# Patient Record
Sex: FEMALE | Race: WHITE | Employment: OTHER
[De-identification: names, ages, dates, MRNs, and addresses within clinical notes are randomized per-mention and may not be internally consistent; named-entity substitution may affect disease eponyms.]

---

## 2017-10-12 ENCOUNTER — RECORDS - HEALTHEAST (OUTPATIENT)
Dept: ADMINISTRATIVE | Facility: OTHER | Age: 82
End: 2017-10-12

## 2017-11-16 ENCOUNTER — RECORDS - HEALTHEAST (OUTPATIENT)
Dept: ADMINISTRATIVE | Facility: OTHER | Age: 82
End: 2017-11-16

## 2017-11-29 ENCOUNTER — COMMUNICATION - HEALTHEAST (OUTPATIENT)
Dept: HEALTH INFORMATION MANAGEMENT | Facility: CLINIC | Age: 82
End: 2017-11-29

## 2018-05-21 ENCOUNTER — HOSPITAL ENCOUNTER (INPATIENT)
Facility: CLINIC | Age: 83
LOS: 24 days | Discharge: HOME HEALTH | DRG: 207 | End: 2018-06-14
Attending: NURSE PRACTITIONER | Admitting: HOSPITALIST
Payer: MEDICARE

## 2018-05-21 ENCOUNTER — APPOINTMENT (OUTPATIENT)
Dept: GENERAL RADIOLOGY | Facility: CLINIC | Age: 83
DRG: 207 | End: 2018-05-21
Attending: SURGERY
Payer: MEDICARE

## 2018-05-21 ENCOUNTER — APPOINTMENT (OUTPATIENT)
Dept: CT IMAGING | Facility: CLINIC | Age: 83
DRG: 207 | End: 2018-05-21
Attending: NURSE PRACTITIONER
Payer: MEDICARE

## 2018-05-21 DIAGNOSIS — R41.0 DELIRIUM: ICD-10-CM

## 2018-05-21 DIAGNOSIS — Z51.89 ENCOUNTER FOR WOUND CARE: ICD-10-CM

## 2018-05-21 DIAGNOSIS — I35.0 NONRHEUMATIC AORTIC VALVE STENOSIS: ICD-10-CM

## 2018-05-21 DIAGNOSIS — T83.511A URINARY TRACT INFECTION ASSOCIATED WITH CATHETERIZATION OF URINARY TRACT, UNSPECIFIED INDWELLING URINARY CATHETER TYPE, INITIAL ENCOUNTER (H): ICD-10-CM

## 2018-05-21 DIAGNOSIS — F32.A DEPRESSION, UNSPECIFIED DEPRESSION TYPE: ICD-10-CM

## 2018-05-21 DIAGNOSIS — K21.9 GASTROESOPHAGEAL REFLUX DISEASE, ESOPHAGITIS PRESENCE NOT SPECIFIED: ICD-10-CM

## 2018-05-21 DIAGNOSIS — J96.21 ACUTE ON CHRONIC RESPIRATORY FAILURE WITH HYPOXEMIA (H): ICD-10-CM

## 2018-05-21 DIAGNOSIS — E87.6 HYPOKALEMIA: ICD-10-CM

## 2018-05-21 DIAGNOSIS — G47.00 INSOMNIA, UNSPECIFIED TYPE: ICD-10-CM

## 2018-05-21 DIAGNOSIS — E11.9 DIABETES MELLITUS WITHOUT COMPLICATION (H): Primary | ICD-10-CM

## 2018-05-21 DIAGNOSIS — N39.0 URINARY TRACT INFECTION ASSOCIATED WITH CATHETERIZATION OF URINARY TRACT, UNSPECIFIED INDWELLING URINARY CATHETER TYPE, INITIAL ENCOUNTER (H): ICD-10-CM

## 2018-05-21 DIAGNOSIS — H04.123 DRY EYES: ICD-10-CM

## 2018-05-21 DIAGNOSIS — N18.9 CHRONIC RENAL IMPAIRMENT, UNSPECIFIED CKD STAGE: ICD-10-CM

## 2018-05-21 DIAGNOSIS — K59.00 CONSTIPATION, UNSPECIFIED CONSTIPATION TYPE: ICD-10-CM

## 2018-05-21 DIAGNOSIS — R10.9 ABDOMINAL CRAMPING: ICD-10-CM

## 2018-05-21 DIAGNOSIS — J31.0 CHRONIC RHINITIS, UNSPECIFIED TYPE: ICD-10-CM

## 2018-05-21 DIAGNOSIS — I50.33 ACUTE ON CHRONIC DIASTOLIC CONGESTIVE HEART FAILURE (H): ICD-10-CM

## 2018-05-21 DIAGNOSIS — Z79.01 CHRONIC ANTICOAGULATION: ICD-10-CM

## 2018-05-21 DIAGNOSIS — Z71.89 ACP (ADVANCE CARE PLANNING): Chronic | ICD-10-CM

## 2018-05-21 DIAGNOSIS — R23.8 SKIN IRRITATION: ICD-10-CM

## 2018-05-21 DIAGNOSIS — I48.0 PAROXYSMAL ATRIAL FIBRILLATION (H): ICD-10-CM

## 2018-05-21 DIAGNOSIS — K56.0 PARALYTIC ILEUS (H): ICD-10-CM

## 2018-05-21 DIAGNOSIS — G89.29 OTHER CHRONIC PAIN: ICD-10-CM

## 2018-05-21 DIAGNOSIS — Z99.11 VENTILATOR DEPENDENCE (H): ICD-10-CM

## 2018-05-21 LAB
ANION GAP SERPL CALCULATED.3IONS-SCNC: 6 MMOL/L (ref 3–14)
BASOPHILS # BLD AUTO: 0.1 10E9/L (ref 0–0.2)
BASOPHILS NFR BLD AUTO: 0.8 %
BUN SERPL-MCNC: 73 MG/DL (ref 7–30)
CALCIUM SERPL-MCNC: 8.9 MG/DL (ref 8.5–10.1)
CHLORIDE SERPL-SCNC: 98 MMOL/L (ref 94–109)
CO2 SERPL-SCNC: 33 MMOL/L (ref 20–32)
CREAT SERPL-MCNC: 1.12 MG/DL (ref 0.52–1.04)
DIFFERENTIAL METHOD BLD: ABNORMAL
EOSINOPHIL # BLD AUTO: 0.1 10E9/L (ref 0–0.7)
EOSINOPHIL NFR BLD AUTO: 1.8 %
ERYTHROCYTE [DISTWIDTH] IN BLOOD BY AUTOMATED COUNT: 18.8 % (ref 10–15)
GFR SERPL CREATININE-BSD FRML MDRD: 46 ML/MIN/1.7M2
GLUCOSE BLDC GLUCOMTR-MCNC: 157 MG/DL (ref 70–99)
GLUCOSE SERPL-MCNC: 175 MG/DL (ref 70–99)
HCT VFR BLD AUTO: 33.4 % (ref 35–47)
HGB BLD-MCNC: 10.1 G/DL (ref 11.7–15.7)
IMM GRANULOCYTES # BLD: 0 10E9/L (ref 0–0.4)
IMM GRANULOCYTES NFR BLD: 0.4 %
INR PPP: 2.25 (ref 0.86–1.14)
LYMPHOCYTES # BLD AUTO: 0.7 10E9/L (ref 0.8–5.3)
LYMPHOCYTES NFR BLD AUTO: 8.8 %
MCH RBC QN AUTO: 26.8 PG (ref 26.5–33)
MCHC RBC AUTO-ENTMCNC: 30.2 G/DL (ref 31.5–36.5)
MCV RBC AUTO: 89 FL (ref 78–100)
MONOCYTES # BLD AUTO: 0.8 10E9/L (ref 0–1.3)
MONOCYTES NFR BLD AUTO: 9.4 %
NEUTROPHILS # BLD AUTO: 6.3 10E9/L (ref 1.6–8.3)
NEUTROPHILS NFR BLD AUTO: 78.8 %
NRBC # BLD AUTO: 0 10*3/UL
NRBC BLD AUTO-RTO: 0 /100
PLATELET # BLD AUTO: 304 10E9/L (ref 150–450)
POTASSIUM SERPL-SCNC: 3.7 MMOL/L (ref 3.4–5.3)
RBC # BLD AUTO: 3.77 10E12/L (ref 3.8–5.2)
SODIUM SERPL-SCNC: 137 MMOL/L (ref 133–144)
WBC # BLD AUTO: 8 10E9/L (ref 4–11)

## 2018-05-21 PROCEDURE — 80048 BASIC METABOLIC PNL TOTAL CA: CPT | Performed by: NURSE PRACTITIONER

## 2018-05-21 PROCEDURE — 87641 MR-STAPH DNA AMP PROBE: CPT | Performed by: HOSPITALIST

## 2018-05-21 PROCEDURE — 99223 1ST HOSP IP/OBS HIGH 75: CPT | Mod: AI | Performed by: HOSPITALIST

## 2018-05-21 PROCEDURE — 87640 STAPH A DNA AMP PROBE: CPT | Mod: XU | Performed by: HOSPITALIST

## 2018-05-21 PROCEDURE — 94640 AIRWAY INHALATION TREATMENT: CPT

## 2018-05-21 PROCEDURE — 36415 COLL VENOUS BLD VENIPUNCTURE: CPT | Performed by: NURSE PRACTITIONER

## 2018-05-21 PROCEDURE — 25000128 H RX IP 250 OP 636: Performed by: HOSPITALIST

## 2018-05-21 PROCEDURE — 99285 EMERGENCY DEPT VISIT HI MDM: CPT | Mod: 25

## 2018-05-21 PROCEDURE — 40000281 ZZH STATISTIC TRANSPORT TIME EA 15 MIN

## 2018-05-21 PROCEDURE — 85025 COMPLETE CBC W/AUTO DIFF WBC: CPT | Performed by: NURSE PRACTITIONER

## 2018-05-21 PROCEDURE — 20000003 ZZH R&B ICU

## 2018-05-21 PROCEDURE — 5A1955Z RESPIRATORY VENTILATION, GREATER THAN 96 CONSECUTIVE HOURS: ICD-10-PCS | Performed by: NURSE PRACTITIONER

## 2018-05-21 PROCEDURE — 85610 PROTHROMBIN TIME: CPT | Performed by: NURSE PRACTITIONER

## 2018-05-21 PROCEDURE — 40000275 ZZH STATISTIC RCP TIME EA 10 MIN

## 2018-05-21 PROCEDURE — 94003 VENT MGMT INPAT SUBQ DAY: CPT | Performed by: SURGERY

## 2018-05-21 PROCEDURE — 40000986 XR CHEST PORT 1 VW

## 2018-05-21 PROCEDURE — 71250 CT THORAX DX C-: CPT

## 2018-05-21 PROCEDURE — 25000132 ZZH RX MED GY IP 250 OP 250 PS 637: Performed by: NURSE PRACTITIONER

## 2018-05-21 PROCEDURE — 25000125 ZZHC RX 250: Performed by: NURSE PRACTITIONER

## 2018-05-21 PROCEDURE — 00000146 ZZHCL STATISTIC GLUCOSE BY METER IP

## 2018-05-21 RX ORDER — WATER 10 ML/10ML
INJECTION INTRAMUSCULAR; INTRAVENOUS; SUBCUTANEOUS
Status: DISPENSED
Start: 2018-05-21 | End: 2018-05-22

## 2018-05-21 RX ORDER — L. ACIDOPHILUS/L.BULGARICUS 1MM CELL
2 TABLET ORAL 2 TIMES DAILY
Status: ON HOLD | COMMUNITY
End: 2018-06-14

## 2018-05-21 RX ORDER — DEXTROSE MONOHYDRATE 25 G/50ML
25-50 INJECTION, SOLUTION INTRAVENOUS
Status: DISCONTINUED | OUTPATIENT
Start: 2018-05-21 | End: 2018-06-14 | Stop reason: HOSPADM

## 2018-05-21 RX ORDER — NALOXONE HYDROCHLORIDE 0.4 MG/ML
.1-.4 INJECTION, SOLUTION INTRAMUSCULAR; INTRAVENOUS; SUBCUTANEOUS
Status: DISCONTINUED | OUTPATIENT
Start: 2018-05-21 | End: 2018-06-14 | Stop reason: HOSPADM

## 2018-05-21 RX ORDER — PETROLATUM 42 G/100G
OINTMENT TOPICAL DAILY PRN
Status: ON HOLD | COMMUNITY
End: 2018-06-14

## 2018-05-21 RX ORDER — POLYETHYLENE GLYCOL 3350 17 G/17G
17 POWDER, FOR SOLUTION ORAL DAILY PRN
Status: DISCONTINUED | OUTPATIENT
Start: 2018-05-21 | End: 2018-06-03

## 2018-05-21 RX ORDER — ONDANSETRON 2 MG/ML
4 INJECTION INTRAMUSCULAR; INTRAVENOUS EVERY 6 HOURS PRN
Status: DISCONTINUED | OUTPATIENT
Start: 2018-05-21 | End: 2018-06-14 | Stop reason: HOSPADM

## 2018-05-21 RX ORDER — CHLORHEXIDINE GLUCONATE ORAL RINSE 1.2 MG/ML
15 SOLUTION DENTAL 2 TIMES DAILY
Status: DISCONTINUED | OUTPATIENT
Start: 2018-05-21 | End: 2018-05-28

## 2018-05-21 RX ORDER — BISACODYL 10 MG
10 SUPPOSITORY, RECTAL RECTAL DAILY PRN
Status: ON HOLD | COMMUNITY
End: 2018-06-14

## 2018-05-21 RX ORDER — FLUTICASONE PROPIONATE 50 MCG
1 SPRAY, SUSPENSION (ML) NASAL DAILY
Status: ON HOLD | COMMUNITY
End: 2018-06-14

## 2018-05-21 RX ORDER — IPRATROPIUM BROMIDE AND ALBUTEROL SULFATE 2.5; .5 MG/3ML; MG/3ML
SOLUTION RESPIRATORY (INHALATION)
Status: DISCONTINUED
Start: 2018-05-21 | End: 2018-05-21 | Stop reason: HOSPADM

## 2018-05-21 RX ORDER — IPRATROPIUM BROMIDE AND ALBUTEROL SULFATE 2.5; .5 MG/3ML; MG/3ML
1 SOLUTION RESPIRATORY (INHALATION) 2 TIMES DAILY PRN
Status: DISCONTINUED | OUTPATIENT
Start: 2018-05-21 | End: 2018-05-29

## 2018-05-21 RX ORDER — BISACODYL 10 MG
10 SUPPOSITORY, RECTAL RECTAL DAILY PRN
Status: DISCONTINUED | OUTPATIENT
Start: 2018-05-21 | End: 2018-06-14 | Stop reason: HOSPADM

## 2018-05-21 RX ORDER — CHLORHEXIDINE GLUCONATE ORAL RINSE 1.2 MG/ML
15 SOLUTION DENTAL EVERY 12 HOURS
Status: DISCONTINUED | OUTPATIENT
Start: 2018-05-21 | End: 2018-05-21

## 2018-05-21 RX ORDER — PROCHLORPERAZINE 25 MG
12.5 SUPPOSITORY, RECTAL RECTAL EVERY 12 HOURS PRN
Status: DISCONTINUED | OUTPATIENT
Start: 2018-05-21 | End: 2018-06-14 | Stop reason: HOSPADM

## 2018-05-21 RX ORDER — LANOLIN ALCOHOL/MO/W.PET/CERES
6 CREAM (GRAM) TOPICAL AT BEDTIME
Status: DISCONTINUED | OUTPATIENT
Start: 2018-05-21 | End: 2018-05-31

## 2018-05-21 RX ORDER — MIRTAZAPINE 7.5 MG/1
15 TABLET, FILM COATED ORAL AT BEDTIME
Status: DISCONTINUED | OUTPATIENT
Start: 2018-05-21 | End: 2018-05-31

## 2018-05-21 RX ORDER — ONDANSETRON 4 MG/1
4 TABLET, ORALLY DISINTEGRATING ORAL EVERY 6 HOURS PRN
Status: DISCONTINUED | OUTPATIENT
Start: 2018-05-21 | End: 2018-06-14 | Stop reason: HOSPADM

## 2018-05-21 RX ORDER — TORSEMIDE 10 MG/1
10 TABLET ORAL
Status: DISCONTINUED | OUTPATIENT
Start: 2018-05-22 | End: 2018-06-01

## 2018-05-21 RX ORDER — CHLORHEXIDINE GLUCONATE ORAL RINSE 1.2 MG/ML
15 SOLUTION DENTAL 2 TIMES DAILY
Status: ON HOLD | COMMUNITY
End: 2018-06-14

## 2018-05-21 RX ORDER — GINSENG 100 MG
CAPSULE ORAL 2 TIMES DAILY
Status: ON HOLD | COMMUNITY
End: 2018-06-14

## 2018-05-21 RX ORDER — IPRATROPIUM BROMIDE AND ALBUTEROL SULFATE 2.5; .5 MG/3ML; MG/3ML
1 SOLUTION RESPIRATORY (INHALATION) 2 TIMES DAILY PRN
Status: ON HOLD | COMMUNITY
End: 2018-06-14

## 2018-05-21 RX ORDER — PROCHLORPERAZINE MALEATE 5 MG
5 TABLET ORAL EVERY 6 HOURS PRN
Status: DISCONTINUED | OUTPATIENT
Start: 2018-05-21 | End: 2018-06-14 | Stop reason: HOSPADM

## 2018-05-21 RX ORDER — METOPROLOL TARTRATE 25 MG/1
25 TABLET, FILM COATED ORAL EVERY 8 HOURS
Status: DISCONTINUED | OUTPATIENT
Start: 2018-05-22 | End: 2018-05-26

## 2018-05-21 RX ORDER — WARFARIN SODIUM 3 MG/1
3 TABLET ORAL ONCE
Status: COMPLETED | OUTPATIENT
Start: 2018-05-21 | End: 2018-05-22

## 2018-05-21 RX ORDER — ACETAMINOPHEN 325 MG/1
650 TABLET ORAL EVERY 4 HOURS PRN
Status: DISCONTINUED | OUTPATIENT
Start: 2018-05-21 | End: 2018-06-14 | Stop reason: HOSPADM

## 2018-05-21 RX ORDER — LACTOBACILLUS RHAMNOSUS GG 10B CELL
1 CAPSULE ORAL 2 TIMES DAILY
Status: DISCONTINUED | OUTPATIENT
Start: 2018-05-21 | End: 2018-06-14 | Stop reason: HOSPADM

## 2018-05-21 RX ORDER — NALOXONE HYDROCHLORIDE 0.4 MG/ML
.1-.4 INJECTION, SOLUTION INTRAMUSCULAR; INTRAVENOUS; SUBCUTANEOUS
Status: DISCONTINUED | OUTPATIENT
Start: 2018-05-21 | End: 2018-05-21

## 2018-05-21 RX ORDER — FLUTICASONE PROPIONATE 50 MCG
1 SPRAY, SUSPENSION (ML) NASAL DAILY
Status: DISCONTINUED | OUTPATIENT
Start: 2018-05-22 | End: 2018-06-14 | Stop reason: HOSPADM

## 2018-05-21 RX ORDER — IPRATROPIUM BROMIDE AND ALBUTEROL SULFATE 2.5; .5 MG/3ML; MG/3ML
3 SOLUTION RESPIRATORY (INHALATION) ONCE
Status: COMPLETED | OUTPATIENT
Start: 2018-05-21 | End: 2018-05-21

## 2018-05-21 RX ORDER — NICOTINE POLACRILEX 4 MG
15-30 LOZENGE BUCCAL
Status: DISCONTINUED | OUTPATIENT
Start: 2018-05-21 | End: 2018-06-14 | Stop reason: HOSPADM

## 2018-05-21 RX ADMIN — ALTEPLASE 2 MG: 2.2 INJECTION, POWDER, LYOPHILIZED, FOR SOLUTION INTRAVENOUS at 22:53

## 2018-05-21 RX ADMIN — METOPROLOL TARTRATE 37.5 MG: 25 TABLET ORAL at 17:18

## 2018-05-21 RX ADMIN — ALTEPLASE 2 MG: 2.2 INJECTION, POWDER, LYOPHILIZED, FOR SOLUTION INTRAVENOUS at 22:54

## 2018-05-21 RX ADMIN — IPRATROPIUM BROMIDE AND ALBUTEROL SULFATE 3 ML: .5; 3 SOLUTION RESPIRATORY (INHALATION) at 16:00

## 2018-05-21 ASSESSMENT — ENCOUNTER SYMPTOMS
SPEECH DIFFICULTY: 1
SHORTNESS OF BREATH: 1

## 2018-05-21 NOTE — IP AVS SNAPSHOT
[facility] Intensive Care Unit    [address] [place] [address]    Phone:  [phone]                                       After Visit Summary   5/21/2018    Jacques Solis    MRN: [id number]           After Visit Summary Signature Page     I have received my discharge instructions, and my questions have been answered. I have discussed any challenges I see with this plan with the nurse or doctor.    ..........................................................................................................................................  Patient/Patient Representative Signature      ..........................................................................................................................................  Patient Representative Print Name and Relationship to Patient    ..................................................               ................................................  Date                                            Time    ..........................................................................................................................................  Reviewed by Signature/Title    ...................................................              ..............................................  Date                                                            Time

## 2018-05-21 NOTE — ED NOTES
Flushed G-tube with 30 mL before administering medication, then flushed with 30 mL water after medication.  Flushes easily.  PICC line, attempted to draw labs and the PICC flushes with slight resistance and does not return blood.  Lab called for phlebotomy draw.

## 2018-05-21 NOTE — IP AVS SNAPSHOT
` `           [facility] INTENSIVE CARE UNIT: [phone]            Medication Administration Report for Jacques Solis as of 05/22/18 1804   Legend:    Given Hold Not Given Due Canceled Entry Other Actions    Time Time (Time) Time  Time-Action       Inactive    Active    Linked        Medications 05/16/18 05/17/18 05/18/18 05/19/18 05/20/18 05/21/18 05/22/18    acetaminophen (TYLENOL) tablet 650 mg  Dose: 650 mg  Freq: EVERY 4 HOURS PRN Route: PO  PRN Reason: mild pain  Start: 05/21/18 2227   Admin Instructions: Maximum acetaminophen dose from all sources = 75 mg/kg/day not to exceed 4 grams/day.    Admin. Amount: 2 tablet (2 × 325 mg tablet)  Dispense Loc:  ADS ICN              Or  acetaminophen (TYLENOL) solution 650 mg  Dose: 650 mg  Freq: EVERY 4 HOURS PRN Route: PER NG TUBE  PRN Reason: mild pain  Start: 05/21/18 2227   Admin Instructions: Maximum acetaminophen dose from all sources= 75 mg/kg/day not to exceed 4 grams/day.    Admin. Amount: 650 mg = 20.3 mL Conc: 32 mg/mL  Dispense Loc:  ADS ICN  Volume: 20.3125 mL               bisacodyl (DULCOLAX) Suppository 10 mg  Dose: 10 mg  Freq: DAILY PRN Route: RE  PRN Reason: constipation  Start: 05/21/18 2218   Admin. Amount: 1 suppository (1 × 10 mg suppository)  Dispense Loc:  ADS ICN               chlorhexidine (PERIDEX) 0.12 % solution 15 mL  Dose: 15 mL  Freq: 2 TIMES DAILY Route: MT  Start: 05/21/18 2230   Admin. Amount: 15 mL  Last Admin: 05/22/18 0830  Dispense Loc:  ADS ICN  Volume: 118 mL           0028 (15 mL)-Given       0830 (15 mL)-Given       [ ] 2100           fluticasone (FLONASE) 50 MCG/ACT spray 1 spray  Dose: 1 spray  Freq: DAILY Route: BOTH NOSTRIL  Start: 05/22/18 0900   Admin. Amount: 1 spray  Last Admin: 05/22/18 0840  Dispense Loc:  Main Pharmacy           0840 (1 spray)-Given           glucose gel 15-30 g  Dose: 15-30 g  Freq: EVERY 15 MIN PRN Route: PO  PRN Reason: low blood sugar  Start: 05/21/18 2229   Admin  Instructions: Give 15 g for BG 51 to 69 mg/dL IF patient is conscious and able to swallow. Give 30 g for BG less than or equal to 50 mg/dL IF patient is conscious and able to swallow. Do NOT give glucose gel via enteral tube.  IF patient has enteral tube: give apple juice 120 mL (4 oz or 15 g of CHO) via enteral tube for BG 51 to 69 mg/dL.  Give apple juice 240 mL (8 oz or 30 g of CHO) via enteral tube for BG less than or equal to 50 mg/dL.    ~Oral gel is preferable for conscious and able to swallow patient.   ~IF gel unavailable or patient refuses may provide apple juice 120 mL (4 oz or 15 g of CHO). Document juice on I and O flowsheet.    Admin. Amount: 15-30 g  Dispense Loc: SH ADS ICN  Volume: 93.75 mL              Or  dextrose 50 % injection 25-50 mL  Dose: 25-50 mL  Freq: EVERY 15 MIN PRN Route: IV  PRN Reason: low blood sugar  Start: 05/21/18 2229   Admin Instructions: Use if have IV access, BG less than 70 mg/dL and meet dose criteria below:  Dose if conscious and alert (or disorientated) and NPO = 25 mL  Dose if unconscious / not alert = 50 mL  Vesicant. For ordered doses up to 25 g, give IV Push undiluted. Give each 5g over 1 minute.    Admin. Amount: 25-50 mL  Dispense Loc: [facility] ADS ICN  Infused Over: 1-5 Minutes  Volume: 50 mL              Or  glucagon injection 1 mg  Dose: 1 mg  Freq: EVERY 15 MIN PRN Route: SC  PRN Reason: low blood sugar  PRN Comment: May repeat x 1 only  Start: 05/21/18 2229   Admin Instructions: May give SQ or IM. ONLY use glucagon IF patient has NO IV access AND is UNABLE to swallow AND blood glucose is LESS than or EQUAL to 50 mg/dL.  If ordered IV, give IV Push over 1 minute. Reconstitute with 1mL sterile water.    Admin. Amount: 1 mg  Dispense Loc: [facility] ADS ICN               hydrogen peroxide 3 % solution  Freq: EVERY 8 HOURS SCHEDULED Route: Top  Start: 05/22/18 1176   Admin Instructions: Trach care-Use Hydrogen peroxide and Sterile Water 1:1 solution.    Dispense Loc: SH ADS  ICN  Volume: 237 mL           [ ] 1645       [ ] 2200           insulin aspart (NovoLOG) inj (RAPID ACTING)  Dose: 1-6 Units  Freq: EVERY 4 HOURS Route: SC  Start: 05/22/18 0000   Admin Instructions: Correction Scale - MEDIUM INSULIN RESISTANCE DOSING     Do Not give Correction Insulin if BG less than 140.  For  - 189 give 1 unit.  For  - 239 give 2 units.  For  - 289 give 3 units.  For  - 339 give 4 units.  For  - 399 give 5 units.  For BG greater than or equal to 400 give 6 units.  Check blood glucose Q4H and administer based on blood glucose.  Notify provider if glucose greater than or equal to 350 mg/dL after administration of correction dose.  If given at mealtime, must be administered 5 min before meal or immediately after.    Admin. Amount: 1-6 Units  Last Admin: 05/22/18 1617  Dispense Loc: Contact Rx for dose  Volume: 3 mL           (0026)-Not Given       0416 (1 Units)-Given       0835 (1 Units)-Given       1342 (2 Units)-Given       1617 (2 Units)-Given [C]       [ ] 2000           insulin glargine (LANTUS) injection 12 Units  Dose: 12 Units  Freq: AT BEDTIME Route: SC  Start: 05/21/18 2230   Admin. Amount: 12 Units  Last Admin: 05/22/18 0020  Dispense Loc:  Main Pharmacy  Volume: 0.12 mL           0020 (12 Units)-Given       [ ] 2200           ipratropium - albuterol 0.5 mg/2.5 mg/3 mL (DUONEB) neb solution 3 mL  Dose: 1 vial  Freq: 2 TIMES DAILY PRN Route: NEBULIZATION  PRN Reason: wheezing  Start: 05/21/18 2217   Admin. Amount: 3 mL  Dispense Loc: [facility] ICN  Volume: 3 mL               lactobacillus rhamnosus (GG) (CULTURELL) capsule 1 capsule  Dose: 1 capsule  Freq: 2 TIMES DAILY Route: ORAL OR FEED  Start: 05/21/18 2300   Admin Instructions: Formulary alternate for lactobacillus  Administer at least 2 hours before or after oral antibiotics. Capsules may be opened.    Admin. Amount: 1 capsule  Last Admin: 05/22/18 0830  Dispense Loc: [facility] ICN           0027 (1  capsule)-Given       0830 (1 capsule)-Given       [ ] 2100           melatonin tablet 6 mg  Dose: 6 mg  Freq: AT BEDTIME Route: PER G TUBE  Start: 05/21/18 2300   Admin. Amount: 2 tablet (2 × 3 mg tablet)  Last Admin: 05/22/18 0059  Dispense Loc:  ADS ICN           0059 (6 mg)-Given       [ ] 2200           metoprolol tartrate (LOPRESSOR) tablet 25 mg  Dose: 25 mg  Freq: EVERY 8 HOURS Route: ORAL OR FEED  Start: 05/22/18 0000   Admin Instructions: Hold for SBP <100 or pulse <60    Admin. Amount: 1 tablet (1 × 25 mg tablet)  Last Admin: 05/22/18 1629  Dispense Loc:  ADS ICN           0030 (25 mg)-Given       0830 (25 mg)-Given       1629 (25 mg)-Given           mirtazapine (REMERON) tablet TABS 15 mg  Dose: 15 mg  Freq: AT BEDTIME Route: ORAL OR FEED  Start: 05/21/18 2300   Admin. Amount: 2 tablet (2 × 7.5 mg tablet)  Last Admin: 05/22/18 0027  Dispense Loc:  ADS ICN           0027 (15 mg)-Given       [ ] 2200           naloxone (NARCAN) injection 0.1-0.4 mg  Dose: 0.1-0.4 mg  Freq: EVERY 2 MIN PRN Route: IV  PRN Reason: opioid reversal  Start: 05/21/18 2221   Admin Instructions: For respiratory rate LESS than or EQUAL to 8.  Partial reversal dose:  0.1 mg titrated q 2 minutes for Analgesia Side Effects Monitoring Sedation Level of 3 (frequently drowsy, arousable, drifts to sleep during conversation).Full reversal dose:  0.4 mg bolus for Analgesia Side Effects Monitoring Sedation Level of 4 (somnolent, minimal or no response to stimulation).  For ordered doses up to 2mg give IVP. Give each 0.4mg over 15 seconds in emergency situations. For non-emergent situations further dilute in 9mL of NS to facilitate titration of response.    Admin. Amount: 0.1-0.4 mg = 0.25-1 mL Conc: 0.4 mg/mL  Dispense Loc:  ADS ICN  Volume: 1 mL               ondansetron (ZOFRAN-ODT) ODT tab 4 mg  Dose: 4 mg  Freq: EVERY 6 HOURS PRN Route: PO  PRN Reasons: nausea,vomiting  Start: 05/21/18 2228   Admin Instructions: This is Step 1 of  nausea and vomiting management.  If nausea not resolved in 15 minutes, go to Step 2 prochlorperazine (COMPAZINE). Do not push through foil backing. Peel back foil and gently remove. Place on tongue immediately. Administration with liquid unnecessary  With dry hands, peel back foil backing and gently remove tablet; do not push oral disintegrating tablet through foil backing; administer immediately on tongue and oral disintegrating tablet dissolves in seconds; then swallow with saliva; liquid not required.    Admin. Amount: 1 tablet (1 × 4 mg tablet)  Dispense Loc: [facility] ADS ICN              Or  ondansetron (ZOFRAN) injection 4 mg  Dose: 4 mg  Freq: EVERY 6 HOURS PRN Route: IV  PRN Reasons: nausea,vomiting  Start: 05/21/18 2228   Admin Instructions: This is Step 1 of nausea and vomiting management.  If nausea not resolved in 15 minutes, go to Step 2 prochlorperazine (COMPAZINE).  Irritant. For ordered doses up to 4 mg, give IV Push undiluted over 2-5 minutes.    Admin. Amount: 4 mg = 2 mL Conc: 4 mg/2 mL  Dispense Loc: [facility] ADS ICN  Infused Over: 2-5 Minutes  Volume: 2 mL               Patient is already receiving anticoagulation with heparin, enoxaparin (LOVENOX), warfarin (COUMADIN)  or other anticoagulant medication  Freq: CONTINUOUS PRN Route: XX  Start: 05/21/18 2227   Dispense Loc: [facility]               polyethylene glycol (MIRALAX/GLYCOLAX) Packet 17 g  Dose: 17 g  Freq: DAILY PRN Route: ORAL OR FEED  PRN Reason: constipation  Start: 05/21/18 2228   Admin Instructions: Give in 8oz of  water, juice, or soda. Hold for loose stools.  This is the second step of a three step constipation treatment.  1 Packet = 17 grams. Mixed prescribed dose in 8 ounces of water. Follow with 8 oz. of water.    Admin. Amount: 17 g  Dispense Loc: [facility] ADS ICN               prochlorperazine (COMPAZINE) injection 5 mg  Dose: 5 mg  Freq: EVERY 6 HOURS PRN Route: IV  PRN Reasons: nausea,vomiting  Start: 05/21/18 2228   Admin  Instructions: This is Step 2 of nausea and vomiting management. Give if nausea not resolved 15 minutes after giving ondansetron (ZOFRAN). If nausea not resolved in 15 minutes, go to Step 3 metoclopramide (REGLAN), if ordered.  For ordered doses up to 10 mg, give IV Push undiluted. Each 5mg over 1 minute.    Admin. Amount: 5 mg = 1 mL Conc: 5 mg/mL  Dispense Loc:  ADS ICN  Infused Over: 1-2 Minutes  Volume: 1 mL              Or  prochlorperazine (COMPAZINE) tablet 5 mg  Dose: 5 mg  Freq: EVERY 6 HOURS PRN Route: PO  PRN Reason: vomiting  Start: 05/21/18 2228   Admin Instructions: This is Step 2 of nausea and vomiting management. Give if nausea not resolved 15 minutes after giving ondansetron (ZOFRAN). If nausea not resolved in 15 minutes, go to Step 3 metoclopramide (REGLAN), if ordered.    Admin. Amount: 1 tablet (1 × 5 mg tablet)  Dispense Loc:  ADS ICN              Or  prochlorperazine (COMPAZINE) Suppository 12.5 mg  Dose: 12.5 mg  Freq: EVERY 12 HOURS PRN Route: RE  PRN Reasons: nausea,vomiting  Start: 05/21/18 2228   Admin Instructions: This is Step 2 of nausea and vomiting management. Give if nausea not resolved 15 minutes after giving ondansetron (ZOFRAN). If nausea not resolved in 15 minutes, go to Step 3 metoclopramide (REGLAN), if ordered.    Admin. Amount: 0.5 suppository (0.5 × 25 mg suppository)  Dispense Loc:  Main Pharmacy               QUEtiapine (SEROquel) quarter-tab 6.25 mg  Dose: 6.25 mg  Freq: EVERY 8 HOURS PRN Route: PO  PRN Comment: AGITATION.  Start: 05/21/18 2218   Admin. Amount: 1 quarter-tab (1 × 6.25 mg quarter-tab)  Last Admin: 05/22/18 0059  Dispense Loc:  ADS ICN           0059 (6.25 mg)-Given           sennosides (SENOKOT) syrup 5 mL  Dose: 5 mL  Freq: DAILY PRN Route: ORAL OR FEED  PRN Reason: constipation  Start: 05/21/18 2218   Admin. Amount: 5 mL  Dispense Loc: [facility] ADS ICN  Volume: 5 mL                 Start: 05/21/18 2251   End: 05/22/18 1059   Admin Instructions: [place],  [place] : martell override    Administrations Remainin           (0019)-Not Given       1059-Med Discontinued       torsemide (DEMADEX) tablet 10 mg  Dose: 10 mg  Freq: 2 TIMES DAILY BEFORE MEALS Route: ORAL OR FEED  Start: 18 0730   Admin. Amount: 1 tablet (1 × 10 mg tablet)  Last Admin: 18 1628  Dispense Loc:  ADS ICN           0830 (10 mg)-Given       1628 (10 mg)-Given           warfarin (COUMADIN) tablet 4 mg  Dose: 4 mg  Freq: ONCE AT 6PM Route: PO  Start: 18 1800   Admin. Amount: 1 tablet (1 × 4 mg tablet)  Dispense Loc:  Main Pharmacy  Administrations Remainin           [ ] 1800           Warfarin Therapy Reminder (Check START DATE - warfarin may be starting in the FUTURE)  Dose: 1 each  Freq: CONTINUOUS PRN Route: XX  Start: 18   Admin Instructions: *Note to reorder warfarin daily*  Pharmacy Warfarin Dosing Service  Patient is on Warfarin Therapy - check for daily order    Admin. Amount: 1 each  Dispense Loc:  Main Pharmacy              Completed Medications  Medications 18         Dose: 2 mg  Freq: EVERY 2 HOURS Route: IK  Start: 18   End: 18   Admin Instructions: Use 10 mL syringe to draw up 2 ML into clotted catheter & allow to dwell for 30 mins, then DRAW BACK and discard contents to assess catheter function. If still occluded, allow mixture to dwell for an additional 90 mins, then DRAW BACK and discard contents to reassess catheter function.  May repeat dose if occlusion persists.  Contact MD if 2nd dose is unsuccessful. Only use a 10 ml syringe to administer the diluted solution into each lumen. For catheters with lumen volumes greater than the volume dispensed, request additional syringe(s) from pharmacy. To prevent inadvertent embolization of thrombus from the catheter, ALWAYS WITHDRAW tPA at the end of the dwell time before administering any solution through the catheter.     Admin. Amount: 2 mg  Last Admin: 18  Dispense Loc: [facility] ICN  Administrations Remainin          2253 (2 mg)-Given [C]       2254 (2 mg)-Given [C]        (0150)-Not Given             Dose: 3 mL  Freq: ONCE Route: NEBULIZATION  Start: 18 1600   End: 18 1600   Admin. Amount: 3 mL  Last Admin: 18 1600  Dispense Loc: [facility] ED COR2  Administrations Remainin  Volume: 3 mL          1600 (3 mL)-Given              Dose: 37.5 mg  Freq: ONCE Route: PER G TUBE  Start: 18   End: 18   Admin. Amount: 1 half-tab (1 × 12.5 mg half-tab) + 1 tablet (1 × 25 mg tablet)  Last Admin: 18  Dispense Loc:  Main Pharmacy  Administrations Remainin   Mixture Administration Information:   Medication Type Amount   metoprolol tartrate 12.5 mg TABS Medications 12.5 mg   metoprolol tartrate 25 MG TABS Medications 25 mg                  1718 (37.5 mg)-Given              Dose: 3 mg  Freq: ONCE Route: PO  Start: 18   End: 18 0020   Admin. Amount: 1 tablet (1 × 3 mg tablet)  Last Admin: 18 0020  Dispense Loc:  Main Pharmacy  Administrations Remainin           0020 (3 mg)-Given          Discontinued Medications  Medications 18         Dose: 15 mL  Freq: EVERY 12 HOURS Route: MT  Start: 18   End: 18   Admin Instructions: Do not swallow.  Discontinue when extubated.    Admin. Amount: 15 mL  Volume: 15 mL                 2303-Med Discontinued          Start: 18   End: 18   Admin Instructions: Ami Hamm : cabinet override    Administrations Remainin2102-Med Discontinued          Dose: 0.1-0.4 mg  Freq: EVERY 2 MIN PRN Route: IV  PRN Reason: opioid reversal  Start: 18   End: 18   Admin Instructions: For respiratory rate LESS than or EQUAL to 8.  Partial reversal dose:  0.1 mg titrated q 2 minutes for  Analgesia Side Effects Monitoring Sedation Level of 3 (frequently drowsy, arousable, drifts to sleep during conversation).Full reversal dose:  0.4 mg bolus for Analgesia Side Effects Monitoring Sedation Level of 4 (somnolent, minimal or no response to stimulation).  For ordered doses up to 2mg give IVP. Give each 0.4mg over 15 seconds in emergency situations. For non-emergent situations further dilute in 9mL of NS to facilitate titration of response.    Admin. Amount: 0.1-0.4 mg = 0.25-1 mL Conc: 0.4 mg/mL  Volume: 1 mL          2247-Med Discontinued

## 2018-05-21 NOTE — IP AVS SNAPSHOT
MRN:[id number]                      After Visit Summary   5/21/2018    Jacques Solis    MRN: [id number]           Thank you!     Thank you for choosing [facility] for your care. Our goal is always to provide you with excellent care. Hearing back from our patients is one way we can continue to improve our services. Please take a few minutes to complete the written survey that you may receive in the mail after you visit with us. Thank you!        Patient Information     Date Of Birth          5/21/1930        Designated Caregiver       Most Recent Value    Caregiver    Will someone help with your care after discharge? yes    Name of designated caregiver Yariel    Phone number of caregiver [phone]    Caregiver address see Psychiatric      About your hospital stay     You were admitted on:  May 21, 2018 You last received care in the:  [facility] Intensive Care Unit    You were discharged on:  June 14, 2018        Reason for your hospital stay       You were hospitalized for atrial fibrillation, shortness of breath, and chronic ventilator dependence.                  Who to Call     For medical emergencies, please call 911.  For non-urgent questions about your medical care, please call your primary care provider or clinic, [phone]          Attending Provider     Provider Specialty    Aliya Camara APRN CNP Nurse Practitioner    Emily Pearl MD Emergency Medicine    [facility] Hospital[facility] wallace MD Internal Medicine       Primary Care Provider Office Phone # Fax #    Michael Mccarthy [phone]      After Care Instructions     Activity       Your activity upon discharge: range of motion exercises in bed.            Diet       Follow this diet upon discharge: Orders Placed This Encounter      Adult Formula Drip Feeding: Continuous Nepro; Gastrostomy; Goal Rate: 35; mL/hr; Medication - Tube Feeding Flush Frequency: At least 15-30 mL water before and after medication  administration and with tube clogging.            Ventilator       Volume Control  Rate 8  Tidal Volume 360  FIO2 30%  PEEP 5    Can pressure support at 15/5 during the day as tolerated.            Wound care and dressings       Instructions to care for your wound at home: Please see discharge instructions                  Follow-up Appointments     Follow-up and recommended labs and tests        Follow up INR Weekly            Follow-up and recommended labs and tests        Please schedule a follow up appointment in one month with your primary care physician.            Follow-up and recommended labs and tests        INR to be drawn once a week.   Results called to Dr. Michael Mccarthy's office  [phone]                  Additional Services     Home Care OT Referral for Hospital Discharge       OT to [facility] and treat    Your provider has ordered home care - occupational therapy. If you have not been contacted within 2 days of your discharge please call the department phone number listed on the top of this document.            Home Care PT Referral for Hospital Discharge       PT to [facility] and treat    Your provider has ordered home care - physical therapy. If you have not been contacted within 2 days of your discharge please call the department phone number listed on the top of this document.            Home Care SLP Referral for Hospital Discharge       SLP to [facility] and treat    Your provider has ordered home care - speech therapy. If you have not been contacted within 2 days of your discharge please call the department phone number listed on the top of this document.            Home care nursing referral       RN skilled nursing visit. RN to assess vital signs and weight, respiratory and cardiac status, patients ability to take and record daily blood pressure, temp and weight, pain level and activity tolerance, hydration, nutrition and bowel status and home safety.    Your provider has ordered home care nursing  services. If you have not been contacted within 2 days of your discharge please call the inpatient department phone number at [phone] .            INR Clinic Referral                 Further instructions from your care team       Please follow up with your Primary Provider for further cares.     Labwork including kidney function and hemoglobin looked improved.     CT chest shows a small bilateral pleural effusion that would not require any intervention.     If your mother develops a fever or increased difficulty on the ventilator return to the ER.     Rt  Lateral calf I & D site  Wound(s) right lateral calf: change dressing on even days and prn (last changed on 18)   1. Clean wound with MicroKlenz Spray, pat dry  2. Paint periwound tissue with No Sting Skin Prep  and allow to dry thoroughly  3. Cut and apply a piece of Aqua Cell AG to wound bed  4. Cover with adaptic, 4 x 4's and secure Kerlix wrap      On  if WOC has not evaluated patient: change tx protocol to the followin. Clean wound with MicroKlenz. Pat dry  2.  Paint periwound tissue with No Sting Skin Prep  and allow to dry thoroughly  3.  Apply Iodosorb gel to adaptic and apply over wound bed  4.  Cover 4x4's and secure Kerlix warp    Alternate above tx plans every 2 weeks until eval by C WOCN  HHC to consult [facility] to f/u on Rt lateral calf wound within  2 weeks to assess and possibly change tx protocol     TF via Gastrostomy FT - Nepro (or comparable product) at 35 mL/hr continuously x 24 hrs.  Free H20 70 mL every 4 hrs.                 Warfarin Instruction     You have started taking a medicine called warfarin. This is a blood-thinning medicine (anticoagulant). It helps prevent and treat blood clots.      Before leaving the hospital, make sure you know how much to take and how long to take it.      You will need regular blood tests to make sure your blood is clotting safely. It is very important to see your doctor for regular  "blood tests.    Talk to your doctor before taking any new medicine (this includes over-the-counter drugs and herbal products). Many medicines can interact with warfarin. This may cause more bleeding or too much clotting.     Eating a lot of vitamin K--found in green, leafy vegetables--can change the way warfarin works in your body. Do NOT avoid these foods. Instead, try to eat the same amount each day.     Bleeding is the most common side-effect of warfarin. You may notice bleeding gums, a bloody nose, bruises and bleeding longer when you cut yourself. See a doctor at once if:   o You cough up blood  o You find blood in your stool (poop)  o You have a deep cut, or a cut that bleeds longer than 10 minutes   o You have a bad cut, hard fall, accident or hit your head (go to urgent care or the emergency room).    For women who can get pregnant: This medicine can harm an unborn baby. Be very careful not to get pregnant while taking this medicine. If you think you might be pregnant, call your doctor right away.    For more information, read \"Guide to Warfarin Therapy,  the booklet you received in the hospital.        General Recommendations To Control Heart Failure When You Get Home       Heart Failure Instructions for Patients and Families: Please read and check off each of these important instructions as you do them when you get home.          Weight and Symptoms       ___ Put a scale in your bathroom.    ___ Post a weight chart or calendar next to your scale.    ___ Weigh yourself everyday as soon as you get up in the morning (before breakfast).  You should only be wearing your pajamas.  Write your weight on the chart/calendar.  ___ Bring your weight chart/calendar with you to all appointments.  ___ Call your doctor or nurse practitioner if you gain 2 pounds (in 1 day) or 5 pounds in (1 week) from your goal  good  weight.  Your good weight is also called your  dry  weight.  Your doctor or nurse will tell you what your " good weight should be.    ___ Call your doctor or nurse practitioner if you have shortness of breath that gets worse over time, leg swelling or fatigue.       Medications and Diet       ___ Make sure to take your medication as prescribed.    ___ Bring a current list of your medication and all of your medicine bottles with you to all appointments.    ___ Limit fluids if you still have swelling or shortness of breath, or if your doctor tells you to do so.    ___ Eat less than 2000 mg of sodium (salt) every day. Read food labels, and do not add salt to meals. Remember, if you eat less salt you retain less fluid.  ___ Follow a heart healthy diet that is low in saturated fat.        Activity and Suggested Lifestyle Changes      ___ Stay active. Talk to your doctor about an exercise program that is safe for your heart.  ___ Stop smoking. Reduce alcohol use.      ___ Lose weight if you are overweight. Extra weight puts a lot of stress on the heart.                 Control for Leg Swelling     ___ Keep your legs elevated (raised) as needed for swelling. If swelling is uncomfortable or elevation doesn t help, ask your doctor about using ACE wraps or support stockings.        What is the [facility]?  Cardiomyopathy  Optimization  Rehabilitation  Education    The [facility] is a heart failure specialty clinic within [facility].  It is an outpatient disease management program that is based on a phase-by-phase approach, which is tailored to each patient s individual needs.  The cardiologist, nurse practitioner, physician assistant and nurses provide an ongoing outpatient care and treatment plan that guides heart failure and cardiomyopathy patients from evaluation and education to stabilization. This team works with your current primary care doctor and cardiologist to help you:    - Avoid hospitalizations  - Slow the progression of the disease  - Improve length and quality of life  - Know who and when to call  if heart failure symptoms appear  - Receive easy access to quality health care and advice  - Better understand your condition and treatment  - Decrease the tremendous cost burden of heart failure care  - Detect future heart problems before they become life threatening                                 Follow-up Appointments     ___ An appointment with the [facility] may already have been made for you (see section   Your next appointments already scheduled ).  If you have a question about your appointment, would like to speak with a [facility].O.R[facility] nurse, or would like to become a [facility] patient, please call one of the following locations:     - [facility]):                                             [phone]  - [facility]):                                            [phone]  - [facility] ([place]):                 [phone]      ___ Your [facility] Clinic Team will continue to educate you on your heart failure and may adjust medications based on your vital signs, lab work, and how you are feeling.  Therefore, it is very important to bring the following to all [facility] appointments:    - An accurate list of your medications  - Your medicine bottles  - Your weight chart/calendar                   Pending Results     Date and Time Order Name Status Description    6/14/2018 1115 XR Chest Port 1 View In process             Statement of Approval     Ordered          06/14/18 1221  I have reviewed and agree with all the recommendations and orders detailed in this document.  EFFECTIVE NOW     Approved and electronically signed by:  Elvin Moeller MD           06/11/18 1433  I have reviewed and agree with all the recommendations and orders detailed in this document.  EFFECTIVE NOW     Approved and electronically signed by:  Elvin Moeller MD             Admission Information     Date & Time Provider Department  "Dept. Phone    2018 Harvinder Nolasco MD [facility] Intensive Care Unit [phone]      Your Vitals Were     Blood Pressure Pulse Temperature Respirations Height Weight    134/74 77 97.5  F (36.4  C) 16 1.651 m (5' 5\") 88.1 kg (194 lb 3.6 oz)    Pulse Oximetry BMI (Body Mass Index)                97% 32.32 kg/m2          [employer] Information     [employer] lets you send messages to your doctor, view your test results, renew your prescriptions, schedule appointments and more. To sign up, go to [email or web address] . Click on \"Log in\" on the left side of the screen, which will take you to the Welcome page. Then click on \"Sign up Now\" on the right side of the page.     You will be asked to enter the access code listed below, as well as some personal information. Please follow the directions to create your username and password.     Your access code is: [email or web address]  Expires: 2018 10:02 AM     Your access code will  in 90 days. If you need help or a new code, please call your [place] clinic or [phone].        Care EveryWhere ID     This is your Care EveryWhere ID. This could be used by other organizations to access your [place] medical records  [id number]        Equal Access to Services     MORALES CHAIDEZ AH: Hadlaura braga Sokarla, waaxda luqadaha, qaybta kaalmada ramiro dawn. So [facility] [phone].    ATENCIÓN: Si habla español, tiene a grant disposición servicios gratuitos de asistencia lingüística. Sam al [phone].    We comply with applicable federal civil rights laws and [place] laws. We do not discriminate on the basis of race, color, national origin, age, disability, sex, sexual orientation, or gender identity.               Review of your medicines      START taking        Dose / Directions    amoxicillin 250 MG/5ML suspension   Commonly known as:  AMOXIL   Indication:  Enterococcus UTI   Used for:  Urinary tract infection associated " with catheterization of urinary tract, unspecified indwelling urinary catheter type, initial encounter (H)        Dose:  500 mg   10 mLs (500 mg) by Per G Tube route 2 times daily for 12 days   Quantity:  240 mL   Refills:  0       blood glucose calibration solution   Commonly known as:  no brand specified   Used for:  Diabetes mellitus without complication (H)        Use to calibrate blood glucose monitor as directed.   Quantity:  1 each   Refills:  0       blood glucose lancets standard   Commonly known as:  no brand specified   Used for:  Diabetes mellitus without complication (H)        Use to test blood sugar 3 times daily or as directed.   Quantity:  100 each   Refills:  11       blood glucose monitoring meter device kit   Commonly known as:  no brand specified   Used for:  Diabetes mellitus without complication (H)        Use to test blood sugar 3 times daily or as directed.   Quantity:  1 kit   Refills:  11       blood glucose monitoring test strip   Commonly known as:  no brand specified   Used for:  Diabetes mellitus without complication (H)        Use to test blood sugars 3 times daily or as directed   Quantity:  100 strip   Refills:  11       Cadexomer Iodine (topical) 0.9% 0.9 % Gel gel   Commonly known as:  IODOSORB   Used for:  Encounter for wound care        Apply topically every other day Use with wound changes   Quantity:  10 g   Refills:  3       hydrogen peroxide 3 % solution   Used for:  Ventilator dependence (H)        Apply topically 3 times daily as needed for wound care (tracheostomy care)   Quantity:  118 mL   Refills:  3       insulin aspart 100 UNIT/ML injection   Commonly known as:  NovoLOG PEN   Used for:  Diabetes mellitus without complication (H)        Dose:  1-6 Units   Inject 1-6 Units Subcutaneous every 4 hours   Quantity:  10 mL   Refills:  11       MICROKLENZ WOUND CLEANSER Liqd   Used for:  Encounter for wound care        Externally apply topically every other day With wound  care   Quantity:  240 mL   Refills:  3       polyethylene glycol Packet   Commonly known as:  MIRALAX/GLYCOLAX   Used for:  Constipation, unspecified constipation type        Dose:  17 g   17 g by Oral or Feeding Tube route 3 times daily as needed for constipation   Quantity:  30 packet   Refills:  3       potassium chloride 20 MEQ/15ML (10%) solution   Commonly known as:  KAYCIEL   Used for:  Hypokalemia        Dose:  20 mEq   Start taking on:  6/15/2018   15 mLs (20 mEq) by Per G Tube route daily   Quantity:  450 mL   Refills:  0       prednisoLONE acetate 0.12 % ophthalmic susp   Commonly known as:  PRED MILD   Used for:  Dry eyes        Dose:  1 drop   Place 1 drop into both eyes 2 times daily   Quantity:  5 mL   Refills:  3       ranitidine 150 MG/10ML syrup   Commonly known as:  Zantac   Used for:  Gastroesophageal reflux disease, esophagitis presence not specified        Dose:  150 mg   Start taking on:  6/15/2018   10 mLs (150 mg) by Per G Tube route daily   Quantity:  473 mL   Refills:  3       simethicone 40 MG/0.6ML suspension   Commonly known as:  MYLICON   Used for:  Abdominal cramping   Replaces:  SIMETHICONE-80 PO        Dose:  40 mg   0.6 mLs (40 mg) by Per G Tube route every 6 hours as needed for cramping   Quantity:  30 mL   Refills:  3       zinc oxide 40 % ointment   Commonly known as:  DESITIN   Used for:  Skin irritation        Apply topically every hour as needed for irritation or skin protection Apply to perineal region   Quantity:  60 g   Refills:  3         CONTINUE these medicines which may have CHANGED, or have new prescriptions. If we are uncertain of the size of tablets/capsules you have at home, strength may be listed as something that might have changed.        Dose / Directions    acetaminophen 325 MG tablet   Commonly known as:  TYLENOL   This may have changed:    - medication strength  - how to take this  - when to take this   Used for:  Other chronic pain        Dose:  650 mg   2  tablets (650 mg) by Per G Tube route every 6 hours as needed for mild pain   Quantity:  100 tablet   Refills:  11       insulin glargine 100 UNIT/ML injection   Commonly known as:  LANTUS   This may have changed:  how much to take   Used for:  Diabetes mellitus without complication (H)        Dose:  14 Units   Inject 14 Units Subcutaneous At Bedtime   Quantity:  5 mL   Refills:  11       lactobacillus Tabs   This may have changed:  how to take this   Used for:  Constipation, unspecified constipation type        Dose:  2 tablet   2 tablets by Per G Tube route 2 times daily   Quantity:  120 tablet   Refills:  3       melatonin 3 MG tablet   This may have changed:    - medication strength  - how to take this   Used for:  Insomnia, unspecified type        Dose:  6 mg   2 tablets (6 mg) by Per G Tube route At Bedtime   Quantity:  60 tablet   Refills:  3       metoprolol tartrate 25 MG tablet   Commonly known as:  LOPRESSOR   This may have changed:  how to take this        Dose:  25 mg   1 tablet (25 mg) by Per G Tube route every 8 hours   Quantity:  90 tablet   Refills:  3       mirtazapine 30 MG tablet   Commonly known as:  REMERON   This may have changed:    - medication strength  - how much to take  - how to take this   Used for:  Depression, unspecified depression type        Dose:  30 mg   1 tablet (30 mg) by Oral or Feeding Tube route At Bedtime   Quantity:  60 tablet   Refills:  3       QUEtiapine 25 MG tablet   Commonly known as:  SEROquel   This may have changed:    - how much to take  - how to take this  - when to take this  - reasons to take this   Used for:  Delirium        Dose:  12.5 mg   0.5 tablets (12.5 mg) by Per G Tube route 3 times daily   Quantity:  45 tablet   Refills:  3       sennosides 8.8 MG/5ML syrup   Commonly known as:  SENOKOT   This may have changed:  how to take this   Used for:  Constipation, unspecified constipation type        Dose:  5 mL   5 mLs by Per G Tube route daily as needed for  constipation   Quantity:  105 mL   Refills:  3       torsemide 10 MG tablet   Commonly known as:  DEMADEX   This may have changed:    - medication strength  - how to take this  - when to take this   Used for:  Chronic renal impairment, unspecified CKD stage        Dose:  10 mg   1 tablet (10 mg) by Per G Tube route 2 times daily   Quantity:  60 tablet   Refills:  3       * WARFARIN SODIUM PO   This may have changed:  Another medication with the same name was added. Make sure you understand how and when to take each.        Dose:  4 mg   Take 4 mg by mouth daily   Refills:  0       * warfarin 3 MG tablet   Commonly known as:  COUMADIN   This may have changed:  You were already taking a medication with the same name, and this prescription was added. Make sure you understand how and when to take each.        Take every Monday, Wednesday, Friday and Sunday   Quantity:  16 tablet   Refills:  3       * warfarin 4 MG tablet   Commonly known as:  COUMADIN   This may have changed:  You were already taking a medication with the same name, and this prescription was added. Make sure you understand how and when to take each.        Take every Tuesday, Thursday, Saturday   Quantity:  12 tablet   Refills:  3       * Notice:  This list has 3 medication(s) that are the same as other medications prescribed for you. Read the directions carefully, and ask your doctor or other care provider to review them with you.      CONTINUE these medicines which have NOT CHANGED        Dose / Directions    bisacodyl 10 MG Suppository   Commonly known as:  DULCOLAX   Used for:  Constipation, unspecified constipation type        Dose:  10 mg   Place 1 suppository (10 mg) rectally daily as needed for constipation   Quantity:  30 suppository   Refills:  3       fluticasone 50 MCG/ACT spray   Commonly known as:  FLONASE   Used for:  Chronic rhinitis, unspecified type        Dose:  1 spray   Spray 1 spray into both nostrils daily   Quantity:  1 Bottle    Refills:  3       ipratropium - albuterol 0.5 mg/2.5 mg/3 mL 0.5-2.5 (3) MG/3ML neb solution   Commonly known as:  DUONEB   Used for:  Ventilator dependence (H)        Dose:  1 vial   Take 1 vial (3 mLs) by nebulization 2 times daily as needed for wheezing   Quantity:  360 mL   Refills:  11         STOP taking     bacitracin 500 UNIT/GM Oint           chlorhexidine 0.12 % solution   Commonly known as:  PERIDEX           HumaLOG KWIKpen 100 UNIT/ML injection   Generic drug:  insulin lispro           HYDROPHOR Oint           SIMETHICONE-80 PO   Replaced by:  simethicone 40 MG/0.6ML suspension           ZAROXOLYN PO                Where to get your medicines      These medications were sent to [facility] - [address]  [address], [place] [address]     Phone:  [phone]     acetaminophen 325 MG tablet    amoxicillin 250 MG/5ML suspension    bisacodyl 10 MG Suppository    blood glucose calibration solution    blood glucose lancets standard    blood glucose monitoring meter device kit    blood glucose monitoring test strip    Cadexomer Iodine (topical) 0.9% 0.9 % Gel gel    fluticasone 50 MCG/ACT spray    hydrogen peroxide 3 % solution    insulin aspart 100 UNIT/ML injection    insulin glargine 100 UNIT/ML injection    ipratropium - albuterol 0.5 mg/2.5 mg/3 mL 0.5-2.5 (3) MG/3ML neb solution    melatonin 3 MG tablet    metoprolol tartrate 25 MG tablet    MICROKLENZ WOUND CLEANSER Liqd    mirtazapine 30 MG tablet    polyethylene glycol Packet    potassium chloride 20 MEQ/15ML (10%) solution    prednisoLONE acetate 0.12 % ophthalmic susp    QUEtiapine 25 MG tablet    ranitidine 150 MG/10ML syrup    sennosides 8.8 MG/5ML syrup    simethicone 40 MG/0.6ML suspension    torsemide 10 MG tablet    warfarin 3 MG tablet    warfarin 4 MG tablet    zinc oxide 40 % ointment         Some of these will need a paper prescription and others can be bought over the counter. Ask your  nurse if you have questions.     Bring a paper prescription for each of these medications     lactobacillus Tabs                Protect others around you: Learn how to safely use, store and throw away your medicines at www.disposemymeds.org.        ANTIBIOTIC INSTRUCTION     You've Been Prescribed an Antibiotic - Now What?  Your healthcare team thinks that you or your loved one might have an infection. Some infections can be treated with antibiotics, which are powerful, life-saving drugs. Like all medications, antibiotics have side effects and should only be used when necessary. There are some important things you should know about your antibiotic treatment.      Your healthcare team may run tests before you start taking an antibiotic.    Your team may take samples (e.g., from your blood, urine or other areas) to run tests to look for bacteria. These test can be important to determine if you need an antibiotic at all and, if you do, which antibiotic will work best.      Within a few days, your healthcare team might change or even stop your antibiotic.    Your team may start you on an antibiotic while they are working to find out what is making you sick.    Your team might change your antibiotic because test results show that a different antibiotic would be better to treat your infection.    In some cases, once your team has more information, they learn that you do not need an antibiotic at all. They may find out that you don't have an infection, or that the antibiotic you're taking won't work against your infection. For example, an infection caused by a virus can't be treated with antibiotics. Staying on an antibiotic when you don't need it is more likely to be harmful than helpful.      You may experience side effects from your antibiotic.    Like all medications, antibiotics have side effects. Some of these can be serious.    Let you healthcare team know if you have any known allergies when you are admitted to the  hospital.    One significant side effect of nearly all antibiotics is the risk of severe and sometimes deadly diarrhea caused by Clostridium difficile (C. Difficile). This occurs when a person takes antibiotics because some good germs are destroyed. Antibiotic use allows C. diificile to take over, putting patients at high risk for this serious infection.    As a patient or caregiver, it is important to understand your or your loved one's antibiotic treatment. It is especially important for caregivers to speak up when patients can't speak for themselves. Here are some important questions to ask your healthcare team.    What infection is this antibiotic treating and how do you know I have that infection?    What side effects might occur from this antibiotic?    How long will I need to take this antibiotic?    Is it safe to take this antibiotic with other medications or supplements (e.g., vitamins) that I am taking?     Are there any special directions I need to know about taking this antibiotic? For example, should I take it with food?    How will I be monitored to know whether my infection is responding to the antibiotic?    What tests may help to make sure the right antibiotic is prescribed for me?      Information provided by:  www.cdc.gov/getsmart  U.S. Department of Health and Human Services  Centers for disease Control and Prevention  National Center for Emerging and Zoonotic Infectious Diseases  Division of Healthcare Quality Promotion             Medication List: This is a list of all your medications and when to take them. Check marks below indicate your daily home schedule. Keep this list as a reference.      Medications           Morning Afternoon Evening Bedtime As Needed    acetaminophen 325 MG tablet   Commonly known as:  TYLENOL   2 tablets (650 mg) by Per G Tube route every 6 hours as needed for mild pain                                   amoxicillin 250 MG/5ML suspension   Commonly known as:  AMOXIL    10 mLs (500 mg) by Per G Tube route 2 times daily for 12 days   Last time this was given:  500 mg on 6/14/2018 11:50 AM   Next Dose Due:  Today 6/14/18 evening                                bisacodyl 10 MG Suppository   Commonly known as:  DULCOLAX   Place 1 suppository (10 mg) rectally daily as needed for constipation                                blood glucose calibration solution   Commonly known as:  no brand specified   Use to calibrate blood glucose monitor as directed.                                blood glucose lancets standard   Commonly known as:  no brand specified   Use to test blood sugar 3 times daily or as directed.                                blood glucose monitoring meter device kit   Commonly known as:  no brand specified   Use to test blood sugar 3 times daily or as directed.                                blood glucose monitoring test strip   Commonly known as:  no brand specified   Use to test blood sugars 3 times daily or as directed                                Cadexomer Iodine (topical) 0.9% 0.9 % Gel gel   Commonly known as:  IODOSORB   Apply topically every other day Use with wound changes                                fluticasone 50 MCG/ACT spray   Commonly known as:  FLONASE   Spray 1 spray into both nostrils daily   Last time this was given:  1 spray on 6/14/2018  9:24 AM                                hydrogen peroxide 3 % solution   Apply topically 3 times daily as needed for wound care (tracheostomy care)   Last time this was given:  5/23/2018 10:00 AM                                insulin aspart 100 UNIT/ML injection   Commonly known as:  NovoLOG PEN   Inject 1-6 Units Subcutaneous every 4 hours   Last time this was given:  2 Units on 6/14/2018 12:44 PM                                insulin glargine 100 UNIT/ML injection   Commonly known as:  LANTUS   Inject 14 Units Subcutaneous At Bedtime   Last time this was given:  14 Units on 6/13/2018  9:02 PM                                 ipratropium - albuterol 0.5 mg/2.5 mg/3 mL 0.5-2.5 (3) MG/3ML neb solution   Commonly known as:  DUONEB   Take 1 vial (3 mLs) by nebulization 2 times daily as needed for wheezing   Last time this was given:  3 mLs on 6/14/2018 12:23 PM                                lactobacillus Tabs   2 tablets by Per G Tube route 2 times daily   Next Dose Due:  Today 6/14/18 evening                                melatonin 3 MG tablet   2 tablets (6 mg) by Per G Tube route At Bedtime   Last time this was given:  6 mg on 6/13/2018  8:48 PM                                metoprolol tartrate 25 MG tablet   Commonly known as:  LOPRESSOR   1 tablet (25 mg) by Per G Tube route every 8 hours   Last time this was given:  25 mg on 6/14/2018  9:25 AM   Next Dose Due:  Tomorrow 6/15/18 morning                                MICROKLENZ WOUND CLEANSER Liqd   Externally apply topically every other day With wound care                                mirtazapine 30 MG tablet   Commonly known as:  REMERON   1 tablet (30 mg) by Oral or Feeding Tube route At Bedtime   Last time this was given:  30 mg on 6/13/2018 10:40 PM                                polyethylene glycol Packet   Commonly known as:  MIRALAX/GLYCOLAX   17 g by Oral or Feeding Tube route 3 times daily as needed for constipation   Last time this was given:  17 g on 6/3/2018  9:07 AM                                potassium chloride 20 MEQ/15ML (10%) solution   Commonly known as:  KAYCIEL   15 mLs (20 mEq) by Per G Tube route daily   Start taking on:  6/15/2018   Last time this was given:  20 mEq on 6/14/2018  9:24 AM                                prednisoLONE acetate 0.12 % ophthalmic susp   Commonly known as:  PRED MILD   Place 1 drop into both eyes 2 times daily   Last time this was given:  1 drop on 6/14/2018  9:24 AM   Next Dose Due:  Today 6/14/18 evening                                QUEtiapine 25 MG tablet   Commonly known as:  SEROquel   0.5 tablets (12.5 mg) by  Per G Tube route 3 times daily   Last time this was given:  12.5 mg on 6/11/2018  3:08 AM                                ranitidine 150 MG/10ML syrup   Commonly known as:  Zantac   10 mLs (150 mg) by Per G Tube route daily   Start taking on:  6/15/2018   Last time this was given:  150 mg on 6/14/2018  9:24 AM                                sennosides 8.8 MG/5ML syrup   Commonly known as:  SENOKOT   5 mLs by Per G Tube route daily as needed for constipation                                simethicone 40 MG/0.6ML suspension   Commonly known as:  MYLICON   0.6 mLs (40 mg) by Per G Tube route every 6 hours as needed for cramping   Last time this was given:  40 mg on 6/3/2018  9:27 AM                                torsemide 10 MG tablet   Commonly known as:  DEMADEX   1 tablet (10 mg) by Per G Tube route 2 times daily   Last time this was given:  10 mg on 6/14/2018  9:24 AM   Next Dose Due:  Today 6/14/18 evening                                * WARFARIN SODIUM PO   Take 4 mg by mouth daily   Last time this was given:  4 mg on 6/13/2018  5:29 PM                                * warfarin 3 MG tablet   Commonly known as:  COUMADIN   Take every Monday, Wednesday, Friday and Sunday   Last time this was given:  4 mg on 6/13/2018  5:29 PM                                * warfarin 4 MG tablet   Commonly known as:  COUMADIN   Take every Tuesday, Thursday, Saturday   Last time this was given:  4 mg on 6/13/2018  5:29 PM                                zinc oxide 40 % ointment   Commonly known as:  DESITIN   Apply topically every hour as needed for irritation or skin protection Apply to perineal region   Last time this was given:  6/13/2018  8:42 PM                                * Notice:  This list has 3 medication(s) that are the same as other medications prescribed for you. Read the directions carefully, and ask your doctor or other care provider to review them with you.

## 2018-05-21 NOTE — LETTER
Jacques Solis MRN# [id number]   YOB: 1930 Age: 88 year old     Date of Admission:  ***  Date of Discharge:  {DISCHARGE DATE:119492}  Admitting Physician:  Harvinder Nolasco MD  Discharging Physician: { :[id number]} (Contact: ***)  Discharging Service:  { :[id number]}  Hospitalization Status: { :2215165}     Primary Care Clinic:  {[facility]:1472748}  Primary Care Provider: Michael Mccarthy     {   Salutation            :[id number]}            You have been identified as the Primary Care Provider for Jacques Solis, who was recently admitted to the [facility].  Thank you for the referral to our hospital.  It is our goal to provide the highest quality of care for our patients, including planning for seamless continuity of care by providing you with timely, accurate and concise information.  After reviewing the following combined discharge summary and final progress note, please contact us if you have any remaining questions.  The Discharging Physician will be the best informed, with their contact information listed above.  If unable to reach them, or if you have received this letter in error, please call [phone] and someone will try to help you.

## 2018-05-21 NOTE — ED NOTES
Bed: ED27  Expected date:   Expected time:   Means of arrival:   Comments:  518  88 F vent dependent/own equipment/weakness  6857

## 2018-05-21 NOTE — IP AVS SNAPSHOT
` ` Patient Information     Patient Name Sex     Jacques Smith ([id number]) Female 1930       Room Bed    I356 I356-01      Patient Demographics     Address Phone    [address]  [place] [address] [phone] (Home)      Patient Ethnicity & Race     Ethnic Group Patient Race    American White      Emergency Contact(s)     Name Relation Home Work Mobile    ASHER SMITH [phone]      Jen Smith  [phone]    Christine Leebulime  [phone]        Documents on File        Status Date Received Description       Documents for the Patient    Consent for EHR Access Received 18     Insurance Card Received 18     Patient ID       [facility] Specified Other       Privacy Notice - [facility] Received 18     Consent for Services - Hospital and Clinic Received 18     HIE Auth Received 18     CE Persistent Point of Care Auth Received 18 CE Persistent Point of Care Authorization       Documents for the Encounter    CMS IM for Patient Signature Received 18     EMS/Ambulance Record  18 [facility]    CE Point of Care Auth Received        Admission Information     Attending Provider Admitting Provider Admission Type Admission Date/Time    Harvinder Nolasco MD Bechard, Paul, MD Emergency 18  1423    Discharge Date Hospital Service Auth/Cert Status Service Area     Hospitalist [facility]    Unit Room/Bed Admission Status        INTENSIVE CARE I356/I356-01 Admission (Confirmed)       Admission     Complaint    Acute on chronic respiratory failure with hypoxemia (H)      Hospital Account     Name Acct ID Class Status Primary Coverage    Jacques Smith [id number] Inpatient Open MEDICARE - MEDICARE            Guarantor Account (for Hospital Account #[id number])     Name Relation to Pt Service Area Active? Acct Type    LaloJacques matamoros Self FCS Yes Personal/Family    Address Phone          [address] [place] [address] [phone](H)              Coverage Information (for Hospital Account #[id number])     1. MEDICARE/MEDICARE     F/O Payor/Plan Precert #    MEDICARE/MEDICARE     Subscriber Subscriber #    Jacques Solis [id number]    Address Phone    [employer]  [address]  [place] [address] [phone]          2. COMMERCIAL/OTHER     F/O Payor/Plan Precert #    COMMERCIAL/OTHER     Subscriber Subscriber #    Jacques Solis [id number]    Address Phone    [address]  [place], [place] [id number] [phone]

## 2018-05-21 NOTE — ED PROVIDER NOTES
History     Chief Complaint:  Shortness of Breath     HPI   Jacques Solis is a 88 year old female with a history of respiratory failure and aortic stenosis who presents to the emergency department today via EMS for evaluation of shortness of breath. The patient was discharged on 5/18/18 from [facility] after a three month admission for respiratory failure and aortic stenosis. The patient's son notes that the patient is ventilator dependent, however he has been trying to wean her off of this. He states that when he does this she is extremely short of breath, though prior to admissions this did not occur. She also had no trouble speaking, eating, or drinking at that time, though she is now. He is now concerned that the patient has pleural effusions, as they were told this could be possible at the time of discharge. The patient denies any pain. She is living with her son who is managing all her cares along with a PCA, who is at the bedside.     Allergies:  Amikacin  Amiodarone  Codeine  Dexmedetomidine  Lisinopril  Penicillins  Sulfa Drugs     Problem List:   Patient Active Problem List   Diagnosis Date Noted     Persistent atrial fibrillation (HC)     Hematoma of left thigh     Chronic respiratory failure with hypoxia and hypercapnia (HC) 04/26/2018     Anemia 04/26/2018     Ileus (HC)     Cellulitis of right lower extremity 03/25/2018     ACP (advance care planning) 03/22/2018   Patient has identified Health Care Agent(s): Yes  Add Health Care Agents: Yes   Health Care Agent(s):  Primary Health Care Agent: Yariel Solis Relationship: son Phone: [phone]   Secondary Health Care Agent: Jen Solis Relationship: dtr Phone: [phone] or [phone]   Third Health Care Agent: Marissa Stevenson Relationship: Grand dtr Phone: [phone]   Guardian: Relationship: Phone:     Patient has Advance Care Plan Documents (Health Care Directive, POLST): Yes   Advance Care Plan Documents:  Health Care Directive    Patient has  identified Specific Treatment Preferences: Yes     How have preferences been verified: per family and HCD    Specific Treatment Preferences:   a.) Code Status: CPR/Attempt Resuscitation         Acute diastolic heart failure (HC)     Severe aortic stenosis     Sepsis (HC) 01/25/2018     C. difficile colitis 01/25/2018     Acute on chronic respiratory failure (HC) 01/25/2018     Paroxysmal atrial fibrillation (HC)     Acute on chronic respiratory failure (HC) 01/17/2018     Anxiety 01/17/2018     Acute on chronic congestive heart failure (HC) 01/17/2018     Encephalopathy acute     EEG abnormality without seizure     ACP (advance care planning)     Palliative care encounter     Debility     On tube feeding diet     Critical lower limb ischemia 10/01/2017     Pulmonary embolism (HC) 10/01/2017     Aortic valve stenosis 06/10/2017     Atrial fibrillation (HC) 06/10/2017     Dementia 06/10/2017   On rivastigmine and namenda      CKD (chronic kidney disease) 06/10/2017     Asthma 06/10/2017     Type 2 diabetes mellitus (HC) 06/10/2017     Depression 06/10/2017   On remeron      Chronic systolic heart failure (HC) 06/10/2017   4/5/17: Decreased LV size, severely increased wall thickness, moderately reduced global systolic function with an estimated EF of 35 - 40%.      Past Medical History:   Diagnosis Date     A-fib (HC)     Alzheimer's dementia     Aortic stenosis, severe     Asthma     CHF (congestive heart failure) (HC)   EF 35%     CKD (chronic kidney disease)     Depression     DM II (diabetes mellitus, type II), controlled (HC)     MDRO (multiple drug resistant organisms) resistance 04/27/2018   Left Leg Ulcer     Pulmonary embolism (HC)     Past Surgical History:   Procedure Laterality Date     IR ANGIO EXTREMITY UNI LEFT (IA) 09/30/2017   THROMBOLYSIS     Routine Gtube change 05/04/2018     Social History     Social History     Marital status:      Smoking status: Never Smoker     Smokeless tobacco: Never  Used     Alcohol use No     Drug use: Unknown     Sexual activity: Not Currently     Other Topics Concern     Not on file     Social History Narrative   Alzheimers dementia. Currently lives with her son.     Family History   Problem Relation Age of Onset     Asthma Mother     Stroke Father     Current Outpatient Prescriptions   Medication Sig     acetaminophen (TYLENOL) 650 mg/20.3 mL oral solution Take 160 mg by mouth every 8 hours if needed. Max acetaminophen dose: 4000mg in 24 hrs.     acetic acid 0.25% 0.25 % irrigation Irrigate to affected area 2 times daily.     albuterol-ipratropium (DUONEB) (2.5-0.5 mg) in 3 mL NEBULIZATION solution Inhale 1 Neb via a nebulizer 2 times daily.     bacitracin ointment Apply topically to affected area(s) 2 times daily.     bisacodyl (DULCOLAX) 10 mg suppository Insert 10 mg rectally once daily if needed for Other (Specify) (constipation).     chlorhexidine (PERIDEX) 0.12 % solution Apply 15 mL topically to affected area(s) every 12 hours.     fluticasone (50 mcg per actuation) nasal solution (FLONASE) Inhale 1 Spray into both nostrils once daily.     insulin lispro (HUMALOG KWIKPEN INSULIN) 100 unit/mL inpn pen Inject subcutaneous every 6 hours. < 70 start hypoglycemia treatment   no insulin  151-200 4 units  201-250 8 units  251-300 10 units  301-350 12 units  351-400 16 units  >400 16 units and contact medical provider     LACTOBACILLUS RHAMNOSUS GG (CULTURELLE ORAL) Take by mouth 2 times daily.     LANTUS SOLOSTAR U-100 INSULIN 100 unit/mL (3 mL) pen Inject 10 Units subcutaneous every 24 hours. Product desired:LANTUS     melatonin 3 mg tablet Administer 2 tablets via G-tube at bedtime.     metOLazone (ZAROXOLYN) 2.5 mg tablet Take 2.5 mg by mouth once daily.     metoprolol tartrate (LOPRESSOR) 25 mg tablet Administer 37.5 mg via G-tube every 6 hours.     mirtazapine (REMERON) 15 mg tablet 15 mg by G-tube route at bedtime.     ondansetron (ZOFRAN) 4 mg/2 mL soln Inject  4 mg intravenous every 6 hours if needed for Nausea/Vomiting.     QUEtiapine (SEROQUEL) 25 mg tablet Administer 0.25 tablets via G-tube every 8 hours if needed (restlessness, agitation).     sennosides (SENNA) 8.8 mg/5 mL syrup Take 5 mL by mouth once daily if needed for Constipation.     simethicone (MYLICON DROPS) 40 mg/0.6 mL drops Administer 1.2 mL via G-tube 4 times daily if needed for GI Upset. Max dose: 500 mg per 24 hrs     sod. hyaluronate- prednisolone (0.001-0.25%)in BSS(PRED-HEALON)(YAN AMB MIXTURE) 1 Drop in both eyes twice a day. Discard 15 days after opening. Refrigerate. Unopened expires:     white petrolatum (HYDROPHOR) 42 % oint Daily prn topical      Review of Systems   Constitutional:        Difficulty eating and drinking  Denies any pain   Respiratory: Positive for shortness of breath (when taken off of the ventilator).    Neurological: Positive for speech difficulty.   All other systems reviewed and are negative.      Physical Exam     Patient Vitals for the past 24 hrs:   BP Temp Temp src Pulse Heart Rate Resp SpO2   05/21/18 2030 - - - - 92 15 -   05/21/18 2015 - - - - 77 20 -   05/21/18 2000 - - - - 80 15 -   05/21/18 1945 - - - - 73 19 100 %   05/21/18 1930 - - - - 68 19 100 %   05/21/18 1915 - - - - 87 11 100 %   05/21/18 1900 - - - - 84 14 100 %   05/21/18 1845 - - - - 83 11 100 %   05/21/18 1830 - - - - 75 20 100 %   05/21/18 1815 - - - - 92 26 100 %   05/21/18 1800 - - - - 85 11 100 %   05/21/18 1745 - - - - 95 20 100 %   05/21/18 1730 - - - - 129 10 -   05/21/18 1715 - - - - 87 12 100 %   05/21/18 1700 - - - - 104 22 100 %   05/21/18 1645 - - - - - - 99 %   05/21/18 1630 - - - - - - 99 %   05/21/18 1600 - - - - - - 99 %   05/21/18 1545 - - - - - - 100 %   05/21/18 1530 - - - - - - 100 %   05/21/18 1515 - - - - - - 100 %   05/21/18 1500 - - - - - - 100 %   05/21/18 1445 - - - - - - 100 %   05/21/18 1430 - - - - - - 100 %   05/21/18 1427 137/73 98.4  F (36.9  C) Oral 87 87 18 100 %      Physical Exam  Constitutional:  Vent dependent on a trach. PCA and son at bedside. Non toxic appearing.  Head: Head moves freely with normal range of motion.   ENT: Oropharynx is clear and moist.   Eyes: Conjunctivae pink. EOMs intact.   Neck: Normal range of motion.   Cardiovascular: irregularly irregular heart beat. Normal heart sounds. No concerning murmur. Intact distal pulses: radial pulses 2+ on the right, 2+ on the left.   Pulmonary/Chest: Trach in place, on a vent, no evidence of fighting against the vent at the time of my evaluation. No wheezes. No rhonchi. Rales bilateral bases.  Abdominal: Soft. Non-tender. No rebound, no guarding.   Musculoskeletal: Lower extremity edema which son notes is chronic. Distal capillary refill and sensation intact.   Neurological: Oriented to person, place, and time. No focal deficits.   Skin: Skin is warm and normal in color. No rash noted.      Emergency Department Course     Imaging:  Radiology findings were communicated with the patient who voiced understanding of the findings.    Chest CT w/o contrast  1. Small to moderate-sized loculated-appearing bilateral pleural  effusions with adjacent atelectasis.  2. A 4 cm patchy opacity in the left upper lobe is nonspecific, with  possibilities including pneumonia and scarring. Comparison with prior  chest would be useful, if available. If not available, a follow-up CT  could be considered in 3 months to confirm resolution of this finding.  Reading per radiology    Laboratory:  Laboratory findings were communicated with the patient who voiced understanding of the findings.    CBC: WBC 8, HGB 10.1 (L),   BMP: Creatinine 1.12 (H), GFR 46 (L), CO2 33 (H), glucose 175 (H), BUN 73 (H), o/w WNL  INR: 2.25 (H)    Interventions:  1600 duoneb 3 mL   1718 lopressor 37.5 mg per G tube      Emergency Department Course:    1432 Nursing notes and vitals reviewed.    1506 I performed an exam of the patient as documented above.      1629 The patient was sent for a chest CT while in the emergency department, results above.     1815 IV was inserted and blood was drawn for laboratory testing, results above.     I personally reviewed the laboratory and imaging results with the patient and answered all related questions prior to discharge.    Impression & Plan      Medical Decision Making:  Jacques Solis is a 88 year old female who presents to the emergency department today for evaluation for pleural effusions. She is vent dependent and she was recently discharged from [facility] in the [facility]. He has been trying to wean her from the vent and notes difficulty in doing so, he wants her evaluated for pleural effusions initially. Then requested we check her labs due to recent decrease in renal function. When I asked about any assistance in caring for his mother he notes a full time PCA. Chest CT with small pleural effusions that are not amenable for any intervention. Labwork from most recent comparison in [facility] using care everywhere, show a stable elevated BUN, improved creatinine, improved hgb and therapeutic INR. I went to discuss that she may be discharged home given there are no acute issues, but unfortunately only the daughter has been with the patient and she notes her brother is the primary decision maker. Pt signed out to Dr Pearl pending disposition. The daughter is aware of this.     Diagnosis:      ICD-10-CM    1. Ventilator dependence (H) Z99.11    2. Chronic renal impairment, unspecified CKD stage N18.9    3. Chronic anticoagulation Z79.01        Disposition:   Pending discussion with her primary caregiver, her son.       Scribe Disclosure:  I, Dee Sims, am serving as a scribe at 2:36 PM on 5/21/2018 to document services personally performed by Aliya Camara based on my observations and the provider's statements to me.     EMERGENCY DEPARTMENT       Aliya Camara, NUNO CNP  05/21/18 2213

## 2018-05-22 ENCOUNTER — APPOINTMENT (OUTPATIENT)
Dept: SPEECH THERAPY | Facility: CLINIC | Age: 83
DRG: 207 | End: 2018-05-22
Attending: HOSPITALIST
Payer: MEDICARE

## 2018-05-22 LAB
ANION GAP SERPL CALCULATED.3IONS-SCNC: 5 MMOL/L (ref 3–14)
BASE EXCESS BLDA CALC-SCNC: 9.2 MMOL/L
BASE EXCESS BLDV CALC-SCNC: 9.7 MMOL/L
BUN SERPL-MCNC: 69 MG/DL (ref 7–30)
CALCIUM SERPL-MCNC: 8.6 MG/DL (ref 8.5–10.1)
CHLORIDE SERPL-SCNC: 98 MMOL/L (ref 94–109)
CO2 SERPL-SCNC: 35 MMOL/L (ref 20–32)
CREAT SERPL-MCNC: 1.16 MG/DL (ref 0.52–1.04)
ERYTHROCYTE [DISTWIDTH] IN BLOOD BY AUTOMATED COUNT: 18.9 % (ref 10–15)
GFR SERPL CREATININE-BSD FRML MDRD: 44 ML/MIN/1.7M2
GLUCOSE BLDC GLUCOMTR-MCNC: 148 MG/DL (ref 70–99)
GLUCOSE BLDC GLUCOMTR-MCNC: 157 MG/DL (ref 70–99)
GLUCOSE BLDC GLUCOMTR-MCNC: 165 MG/DL (ref 70–99)
GLUCOSE BLDC GLUCOMTR-MCNC: 182 MG/DL (ref 70–99)
GLUCOSE BLDC GLUCOMTR-MCNC: 201 MG/DL (ref 70–99)
GLUCOSE BLDC GLUCOMTR-MCNC: 208 MG/DL (ref 70–99)
GLUCOSE SERPL-MCNC: 172 MG/DL (ref 70–99)
HBA1C MFR BLD: 6 % (ref 0–5.6)
HCO3 BLD-SCNC: 34 MMOL/L (ref 21–28)
HCO3 BLDV-SCNC: 36 MMOL/L (ref 21–28)
HCT VFR BLD AUTO: 31.3 % (ref 35–47)
HGB BLD-MCNC: 9.5 G/DL (ref 11.7–15.7)
INR PPP: 2.31 (ref 0.86–1.14)
MAGNESIUM SERPL-MCNC: 2.1 MG/DL (ref 1.6–2.3)
MCH RBC QN AUTO: 27.1 PG (ref 26.5–33)
MCHC RBC AUTO-ENTMCNC: 30.4 G/DL (ref 31.5–36.5)
MCV RBC AUTO: 89 FL (ref 78–100)
MRSA DNA SPEC QL NAA+PROBE: NEGATIVE
O2/TOTAL GAS SETTING VFR VENT: 40 %
OXYHGB MFR BLD: 99 % (ref 92–100)
OXYHGB MFR BLDV: 74 %
PCO2 BLD: 45 MM HG (ref 35–45)
PCO2 BLDV: 56 MM HG (ref 40–50)
PH BLD: 7.48 PH (ref 7.35–7.45)
PH BLDV: 7.41 PH (ref 7.32–7.43)
PHOSPHATE SERPL-MCNC: 3.2 MG/DL (ref 2.5–4.5)
PLATELET # BLD AUTO: 295 10E9/L (ref 150–450)
PO2 BLD: 145 MM HG (ref 80–105)
PO2 BLDV: 42 MM HG (ref 25–47)
POTASSIUM SERPL-SCNC: 3.7 MMOL/L (ref 3.4–5.3)
RBC # BLD AUTO: 3.51 10E12/L (ref 3.8–5.2)
SODIUM SERPL-SCNC: 138 MMOL/L (ref 133–144)
SPECIMEN SOURCE: NORMAL
WBC # BLD AUTO: 8.2 10E9/L (ref 4–11)

## 2018-05-22 PROCEDURE — 92597 ORAL SPEECH DEVICE EVAL: CPT | Mod: GN,XU | Performed by: SPEECH-LANGUAGE PATHOLOGIST

## 2018-05-22 PROCEDURE — 25000132 ZZH RX MED GY IP 250 OP 250 PS 637: Mod: GY

## 2018-05-22 PROCEDURE — 40000257 ZZH STATISTIC CONSULT NO CHARGE VASC ACCESS

## 2018-05-22 PROCEDURE — 40000894 ZZH STATISTIC OT IP EVAL DEFER

## 2018-05-22 PROCEDURE — 25000131 ZZH RX MED GY IP 250 OP 636 PS 637: Mod: GY | Performed by: HOSPITALIST

## 2018-05-22 PROCEDURE — 94640 AIRWAY INHALATION TREATMENT: CPT | Mod: 76

## 2018-05-22 PROCEDURE — G9172 VOICE GOAL STATUS: HCPCS | Mod: GN,CM | Performed by: SPEECH-LANGUAGE PATHOLOGIST

## 2018-05-22 PROCEDURE — A9270 NON-COVERED ITEM OR SERVICE: HCPCS | Mod: GY

## 2018-05-22 PROCEDURE — 92507 TX SP LANG VOICE COMM INDIV: CPT | Mod: GN | Performed by: SPEECH-LANGUAGE PATHOLOGIST

## 2018-05-22 PROCEDURE — 85027 COMPLETE CBC AUTOMATED: CPT | Performed by: HOSPITALIST

## 2018-05-22 PROCEDURE — 25000125 ZZHC RX 250: Performed by: INTERNAL MEDICINE

## 2018-05-22 PROCEDURE — 40000239 ZZH STATISTIC VAT ROUNDS

## 2018-05-22 PROCEDURE — 25000132 ZZH RX MED GY IP 250 OP 250 PS 637: Mod: GY | Performed by: HOSPITALIST

## 2018-05-22 PROCEDURE — 99291 CRITICAL CARE FIRST HOUR: CPT | Performed by: INTERNAL MEDICINE

## 2018-05-22 PROCEDURE — 40000275 ZZH STATISTIC RCP TIME EA 10 MIN

## 2018-05-22 PROCEDURE — G0463 HOSPITAL OUTPT CLINIC VISIT: HCPCS

## 2018-05-22 PROCEDURE — 20000003 ZZH R&B ICU

## 2018-05-22 PROCEDURE — 82805 BLOOD GASES W/O2 SATURATION: CPT | Performed by: INTERNAL MEDICINE

## 2018-05-22 PROCEDURE — 85610 PROTHROMBIN TIME: CPT | Performed by: HOSPITALIST

## 2018-05-22 PROCEDURE — 40000008 ZZH STATISTIC AIRWAY CARE

## 2018-05-22 PROCEDURE — 83735 ASSAY OF MAGNESIUM: CPT | Performed by: HOSPITALIST

## 2018-05-22 PROCEDURE — 80048 BASIC METABOLIC PNL TOTAL CA: CPT | Performed by: HOSPITALIST

## 2018-05-22 PROCEDURE — A9270 NON-COVERED ITEM OR SERVICE: HCPCS | Mod: GY | Performed by: HOSPITALIST

## 2018-05-22 PROCEDURE — 00000146 ZZHCL STATISTIC GLUCOSE BY METER IP

## 2018-05-22 PROCEDURE — G9171 VOICE CURRENT STATUS: HCPCS | Mod: GN,CM | Performed by: SPEECH-LANGUAGE PATHOLOGIST

## 2018-05-22 PROCEDURE — 27210432 ZZH NUTRITION PRODUCT RENAL BASIC LITER

## 2018-05-22 PROCEDURE — 40000225 ZZH STATISTIC SLP WARD VISIT: Performed by: SPEECH-LANGUAGE PATHOLOGIST

## 2018-05-22 PROCEDURE — 84100 ASSAY OF PHOSPHORUS: CPT | Performed by: HOSPITALIST

## 2018-05-22 PROCEDURE — 83036 HEMOGLOBIN GLYCOSYLATED A1C: CPT | Performed by: HOSPITALIST

## 2018-05-22 PROCEDURE — 94003 VENT MGMT INPAT SUBQ DAY: CPT

## 2018-05-22 PROCEDURE — 82805 BLOOD GASES W/O2 SATURATION: CPT | Performed by: HOSPITALIST

## 2018-05-22 RX ORDER — WARFARIN SODIUM 4 MG/1
4 TABLET ORAL
Status: COMPLETED | OUTPATIENT
Start: 2018-05-22 | End: 2018-05-22

## 2018-05-22 RX ORDER — ACETAMINOPHEN 160 MG
TABLET,DISINTEGRATING ORAL EVERY 8 HOURS SCHEDULED
Status: DISCONTINUED | OUTPATIENT
Start: 2018-05-22 | End: 2018-05-23

## 2018-05-22 RX ADMIN — INSULIN ASPART 2 UNITS: 100 INJECTION, SOLUTION INTRAVENOUS; SUBCUTANEOUS at 16:17

## 2018-05-22 RX ADMIN — Medication 1 CAPSULE: at 08:30

## 2018-05-22 RX ADMIN — CHLORHEXIDINE GLUCONATE 15 ML: 1.2 RINSE ORAL at 00:28

## 2018-05-22 RX ADMIN — METOPROLOL TARTRATE 25 MG: 25 TABLET, FILM COATED ORAL at 16:29

## 2018-05-22 RX ADMIN — Medication 1 CAPSULE: at 00:27

## 2018-05-22 RX ADMIN — HYDROGEN PEROXIDE: 2.65 LIQUID TOPICAL at 18:13

## 2018-05-22 RX ADMIN — MIRTAZAPINE 15 MG: 7.5 TABLET ORAL at 00:27

## 2018-05-22 RX ADMIN — WARFARIN SODIUM 4 MG: 4 TABLET ORAL at 18:12

## 2018-05-22 RX ADMIN — INSULIN ASPART 1 UNITS: 100 INJECTION, SOLUTION INTRAVENOUS; SUBCUTANEOUS at 20:24

## 2018-05-22 RX ADMIN — FLUTICASONE PROPIONATE 1 SPRAY: 50 SPRAY, METERED NASAL at 08:40

## 2018-05-22 RX ADMIN — MIRTAZAPINE 15 MG: 7.5 TABLET ORAL at 21:09

## 2018-05-22 RX ADMIN — INSULIN GLARGINE 12 UNITS: 100 INJECTION, SOLUTION SUBCUTANEOUS at 21:12

## 2018-05-22 RX ADMIN — CHLORHEXIDINE GLUCONATE 15 ML: 1.2 RINSE ORAL at 08:30

## 2018-05-22 RX ADMIN — METOPROLOL TARTRATE 25 MG: 25 TABLET, FILM COATED ORAL at 00:30

## 2018-05-22 RX ADMIN — CHLORHEXIDINE GLUCONATE 15 ML: 1.2 RINSE ORAL at 20:24

## 2018-05-22 RX ADMIN — Medication 1 CAPSULE: at 21:09

## 2018-05-22 RX ADMIN — Medication 6.25 MG: at 00:59

## 2018-05-22 RX ADMIN — INSULIN ASPART 1 UNITS: 100 INJECTION, SOLUTION INTRAVENOUS; SUBCUTANEOUS at 04:16

## 2018-05-22 RX ADMIN — MELATONIN TAB 3 MG 6 MG: 3 TAB at 21:09

## 2018-05-22 RX ADMIN — INSULIN ASPART 1 UNITS: 100 INJECTION, SOLUTION INTRAVENOUS; SUBCUTANEOUS at 08:35

## 2018-05-22 RX ADMIN — MELATONIN TAB 3 MG 6 MG: 3 TAB at 00:59

## 2018-05-22 RX ADMIN — INSULIN GLARGINE 12 UNITS: 100 INJECTION, SOLUTION SUBCUTANEOUS at 00:20

## 2018-05-22 RX ADMIN — METOPROLOL TARTRATE 25 MG: 25 TABLET, FILM COATED ORAL at 08:30

## 2018-05-22 RX ADMIN — WARFARIN SODIUM 3 MG: 3 TABLET ORAL at 00:20

## 2018-05-22 RX ADMIN — TORSEMIDE 10 MG: 10 TABLET ORAL at 08:30

## 2018-05-22 RX ADMIN — INSULIN ASPART 2 UNITS: 100 INJECTION, SOLUTION INTRAVENOUS; SUBCUTANEOUS at 13:42

## 2018-05-22 RX ADMIN — TORSEMIDE 10 MG: 10 TABLET ORAL at 16:28

## 2018-05-22 NOTE — PROGRESS NOTES
Patient needs to be on facility vent for hospital stay.  Rationale :1) no authority over infection control of device, 2) biomed issue for care of equipment, 3) staffing issues with familiarity of device.      Patent can transition to home ventilatory upon discharge.     Discussed with bedside nurse as well RT and attending.      Abdoul Cardenas  ICU Director

## 2018-05-22 NOTE — PROGRESS NOTES
Called by Hospitalist for vent management  Pt with complicated recent Hx: in hospital at [facility], then [facility]  Chronic vent dependence/trach  Recently discharged from [facility]  Son is concerned that he did not receive thorough instruction at discharge  No acute problems other than the above  Patient's vent settings reviewed and vent orders written  GB  [phone]

## 2018-05-22 NOTE — H&P
[facility]    History and Physical  Hospitalist       Date of Admission:  5/21/2018    Assessment & Plan   Jacques Solis is a 88 year old female with past history A-fib, CHF, severe AS, DM II, asthma, chronic respiratory failure with vent dependence who was discharged home from [facility] on 5/18/18 but did not have appropriate home services arranged, but son also reporting a recent decline in respiratory status.    Acute on chronic hypoxic respiratory failure with vent dependence:  Chronic failure felt due to pleural effusions and overall debility.  Pulmonologist at [facility] felt she would chronically be vent dependent and weaning efforts were stopped.  CT chest in ED shows small to moderated loculated bilateral pleural effusions with atelectasis.  Also a 4 cm gil opacity in left upper lobe, but no SIRS criteria present.  - discussed with Intensivist, feel there is nothing acute here and recommend discharge back to [facility] vs home; recs much appreciated  - SLP consult    Paroxysmal A-fib:  - continue PTA metoprolol (note son has been dosing this at 25 mg q8h)  - INR therapeutic (though discharge summary states goal of 2.5-3); continue coumadin, PharmD to dose    Hx of PE  - coumadin as above    Severe AS  Chronic diastolic CHF  TTE 3/2018 with EF 55-60%, severe AS (area of 0.6-0.7) and mild regurgitation, moderate MR.  Is reportedly not a TAVR candidate.  - continue PTA metolazone and torsemide    DM II:  Continue PTA Lanus 12 units daily with SSI    Asthma:  Continue PTA nebs.    Recent acute blood loss anemia:  Reportedly due to supratherapeutic lovenox in setting of PAULY.  Hgb at recent discharge 8.4, now improved to 10 g/dL.    Possible encephalopathy  Discharge summary states probable encephalopathy due to critical illness superimposed on some dementia.  Son denies any history of dementia.    - continue PTA Seroquel    Nonhealing right leg wound  Due to hematoma with necrotic eschar now s/p  "debridement  - WOC consult    Dysphagia  - continue PTA tube feeds with nutrition consult    DVT Prophylaxis: Warfarin  Code Status: Full Code per son; note that on further chart review, it appears there was an ethics consult at [facility] where family agreed to a special code status where there would be \"no compressions\" but ok to shock    # Pain Assessment:  Current Pain Score 5/21/2018   Patient currently in pain? denies   Pain score (0-10) 0   Jacques newell pain level was assessed and she currently denies pain.          Disposition: Expected discharge in 3 days once arrangement for appropriate home care resources can be arranged.    Harvinder Nolasco    Primary Care Physician   ANGEL RASHID    Chief Complaint   Lack of home resources for management    History is obtained from the patient's son (who is an anesthesiologist) and chart review    History of Present Illness   Jacques Solis is a 88 year old female who presents with inadequate home support and possible acute on chronic hypoxic respiratory failure.  She has essentially been hospitalized since September 2017 when she was admitted to [facility] for critical limb ischemia and underwent balloon angioplasty which was complicated by femoral artery perforation with hemorrhagic shock.  She also had chronic respiratory and was unable to wean from the vent and was trached and pegged on 11/13/17.  She was also noted to have some encephalopathy.  Ultimately she was discharged to [place]. She has essentially moved back and forth from [facility] and [facility] and then to [facility] and [facility] ever since.  Her most recent admission to [facility] 4/26-5/4 was due to acute blood loss anemia due to left thigh hematoma in addition to renal failure due to tobramycin toxicity for multi-drug resistant Pseudomonas.  She also underwent debridement of a right lower extremity wound due to an infected hematoma.  She was ultimately discharged to [facility], where she has stayed until discharge home on 5/18/18.  " Pulmonology felt that she would not be able to wean and recommended discharge home or to TCU and son elected for discharge home.      He reports he was not set up with anyone who would be able to manage a home vent, reports not being given detailed sliding scale insulin instructions (no dosage parameters), was unable to obtain nebs due to lack of appropriate ICD code on discharge orders, did not have supplies at home to replace clogged tube feeding piece in addition to concerns about the competency of the home nurse.  Due to these issues he presented to [facility], unclear as to why he did not return to [facility], which is very familiar with the patient's case.  He reports his mother has otherwise been stable with the exception that she experienced several acute desaturation events over the past 2 days which resolved with suctioning.  He reports about 1 month ago she was able to tolerate being off the ventilator for about 16 hours/day and was able to speak with passy wilner valve.    Son reports his goals are for his mother to be able to wean from the vent for at least a few hours and be able to speak with valve and possible eat some food by mouth.  He understands that she may never be able to fully wean from the vent and is accepting of this, however, he wants any reversible conditions treated.    Past Medical History    Paroxysmal atrial fibrillation  Chronic respiratory failure secondary to recurrent pleural effusions and deconditioning  Severe aortic stenosis  Chronic diastolic congestive heart failure  Diabetes mellitus type 2  Asthma   Dysphagia with feeding tube dependence  History of critical limb ischemia    Past Surgical History   D&C  Tracheostomy  PEG tube placement    Prior to Admission Medications   Prior to Admission Medications   Prescriptions Last Dose Informant Patient Reported? Taking?   Acetaminophen (TYLENOL PO)  at PRN Care Giver Yes Yes   Sig: Take 650 mg by mouth every 8 hours as needed for mild  pain   Emollient (HYDROPHOR) OINT  at PRN Care Giver Yes Yes   Sig: Externally apply topically daily as needed   MELATONIN PO 2018 at HS Care Giver Yes Yes   Sig: Take 6 mg by mouth At Bedtime   METOPROLOL TARTRATE PO 2018 at NOON Care Giver Yes Yes   Si mg by Oral or G tube route every 8 hours    MetOLazone (ZAROXOLYN PO)  at PRN Care Giver Yes Yes   Sig: Take 2.5 mg by mouth daily as needed for other (BODY WEIGHT OVER 192 LBS)   Mirtazapine (REMERON PO) 2018 at HS Care Giver Yes Yes   Sig: 15 mg by Oral or G tube route At Bedtime   QUEtiapine Fumarate (SEROQUEL PO)  at PRN Care Giver Yes Yes   Sig: Take 6.25 mg by mouth every 8 hours as needed (AGITATION.)   SIMETHICONE-80 PO  at PRN Care Giver Yes Yes   Sig: Take 80 mg by mouth 4 times daily as needed for intestinal gas or other (FLATULENCE)   Torsemide (DEMADEX PO) 2018 at PM Care Giver Yes Yes   Sig: 10 mg by Oral or G tube route 2 times daily (before meals)   WARFARIN SODIUM PO 2018 at PM Care Giver Yes Yes   Sig: Take 4 mg by mouth daily   bacitracin 500 UNIT/GM OINT 2018 at AM Care Giver Yes Yes   Sig: Apply topically 2 times daily   bisacodyl (DULCOLAX) 10 MG Suppository  at PRN Care Giver Yes Yes   Sig: Place 10 mg rectally daily as needed for constipation   chlorhexidine (PERIDEX) 0.12 % solution 2018 at AM Care Giver Yes Yes   Sig: Take 15 mLs by mouth 2 times daily   fluticasone (FLONASE) 50 MCG/ACT spray 2018 at AM Care Giver Yes Yes   Sig: Spray 1 spray into both nostrils daily   insulin glargine (LANTUS SOLOSTAR) 100 UNIT/ML pen 2018 at HS Care Giver Yes Yes   Sig: Inject 12 Units Subcutaneous At Bedtime   insulin lispro (HUMALOG KWIKPEN) 100 UNIT/ML injection 2018 at NOON Care Giver Yes Yes   Sig: Inject 0.12 Units Subcutaneous every 6 hours NO SLIDING SCALE WAS GIVEN. PREVIOUS SS, WHILE AT [facility], WENT UP TO 16 UNITS.   ipratropium - albuterol 0.5 mg/2.5 mg/3 mL (DUONEB) 0.5-2.5 (3) MG/3ML neb  solution  at PRN Care Giver Yes Yes   Sig: Take 1 vial by nebulization 2 times daily as needed for wheezing   lactobacillus TABS 2018 at AM Care Giver Yes Yes   Si tablets by Oral or G tube route 2 times daily   sennosides (SENOKOT) 8.8 MG/5ML syrup  at PRN Care Giver Yes Yes   Sig: Take 5 mLs by mouth daily as needed for constipation      Facility-Administered Medications: None     Allergies   Allergies   Allergen Reactions     Amikacin      Amiodarone      Codeine      Dexmedetomidine      Lisinopril      Penicillins      Sulfa Drugs        Social History   I have personally reviewed the social history with the patient showing no tobacco or alcohol use.    Family History   Son reports no significant family history    Review of Systems   The 10 point Review of Systems is negative other than noted in the HPI or here.     Physical Exam   Temp: 98.4  F (36.9  C) (Simultaneous filing. User may not have seen previous data.) Temp src: Oral (Simultaneous filing. User may not have seen previous data.) BP: 109/66 Pulse: 87 Heart Rate: 81 Resp: 22 SpO2: 100 % O2 Device: Mechanical Ventilator    Vital Signs with Ranges  Temp:  [98.4  F (36.9  C)] 98.4  F (36.9  C)  Pulse:  [87] 87  Heart Rate:  [] 81  Resp:  [10-26] 22  BP: (108-137)/(60-73) 109/66  FiO2 (%):  [40 %] 40 %  SpO2:  [99 %-100 %] 100 %  184 lbs 8.4 oz    Constitutional: well developed, well nourished female in no acute distress  Eyes: pupils equal, round and reactive to light, no icterus  HEENT: head normocephalic, atraumatic, mucous membranes moist, no cervical lymphadenopathy, trach in place  Respiratory: lungs clear to auscultation bilaterally, no crackles or wheezes, no tachypnea  Cardiovascular: regular rate and rhythm, normal S1/S2, no murmur, rubs or gallops  GI: abdomen soft, non-tender, non-distended, normal bowel sounds, no hepatosplenomegaly, PEG tube in place  Lymph/Hematologic: trace peripheral edema  Skin: No rash or  bruising  Musculoskeletal: Extremities warm and well perfused  Neurologic: alert, follows commands, mouths answers to questions, cranial nerves grossly intact, gross motor movements intact      Data   Data reviewed today:  I personally reviewed no images or EKG's today.    Recent Labs  Lab 05/21/18  1815   WBC 8.0   HGB 10.1*   MCV 89      INR 2.25*      POTASSIUM 3.7   CHLORIDE 98   CO2 33*   BUN 73*   CR 1.12*   ANIONGAP 6   KRISTOFER 8.9   *       Recent Results (from the past 24 hour(s))   Chest CT w/o contrast    Narrative    CT CHEST WITHOUT CONTRAST   5/21/2018 4:29 PM     HISTORY: Evaluate for pleural effusion.    COMPARISON: None.    TECHNIQUE: Without intravenous contrast, helical sections were  acquired through the lungs. Coronal reconstructions were generated.  Radiation dose for this scan was reduced using automated exposure  control, adjustment of the mA and/or kV according to the patient's  size, or iterative reconstruction technique.    FINDINGS: A tracheostomy tube is present with distal tube tip in the  mid trachea. Small to moderate-sized bilateral loculated-appearing  pleural effusions. Atelectasis is present in the lungs adjacent to the  effusions. 4.3 x 2.1 cm patchy opacity in the posterior aspect of the  left upper lobe (series 3 image 17). The lungs are otherwise clear. A  very small amount of pericardial fluid. No enlarged lymph nodes in the  chest. Mild cardiomegaly. Atherosclerotic calcification in the  thoracic aorta and coronary arteries. Right upper extremity central  venous catheter with distal catheter tip in the superior vena cava.    Scan through the upper abdomen is significant for nonspecific diffuse  thickening of bilateral adrenal glands and a previous gastric tube  with balloon tip in the gastric body.      Impression    IMPRESSION:   1. Small to moderate-sized loculated-appearing bilateral pleural  effusions with adjacent atelectasis.  2. A 4 cm patchy opacity  in the left upper lobe is nonspecific, with  possibilities including pneumonia and scarring. Comparison with prior  chest would be useful, if available. If not available, a follow-up CT  could be considered in 3 months to confirm resolution of this finding.

## 2018-05-22 NOTE — PLAN OF CARE
Problem: Respiratory Distress Syndrome (,NICU)  Goal: Signs and Symptoms of Listed Potential Problems Will be Absent, Minimized or Managed (Respiratory Distress Syndrome)  Signs and symptoms of listed potential problems will be absent, minimized or managed by discharge/transition of care (reference Respiratory Distress Syndrome ([place],NICU) CPG).  Outcome: No Change  Pt  arrived from ER with Son.   All belongings including home vent sent home with son Yariel.  Pt and son oriented to room and plan of care.    Hospitalist in room at bedside during admission.    Neuro-  Pt is confused yet alert.  Disoriented to time, place and situation.  Will follow commands.  Will move all extremities.    CV-  HR controlled A-fib on coumadin and beta lockers.   VSS.  Pt has a PICC line that will get Alteplase.    Resp-  Home Vent settings.  Spo2 >95%.  L./S are diminished.   Able to suction moderate amounts of  Tan / Thick secretions out ET tube.      Gi/-  Pt has peg tube.  Incontinent of urine.  Will start TF tonight.    Skin :   Pt has large stage 3 RT leg ulcer.  WOC ordered.  Ulcer cleaned and redressed.

## 2018-05-22 NOTE — PROGRESS NOTES
Hospitalist brief update note:    Patient with tracheostomy on vent, chronic but stable, discussed with Dr Perez, intensivist taking over care.     Rose Marie Caldera MD  Hospitalist

## 2018-05-22 NOTE — CONSULTS
"CLINICAL NUTRITION SERVICES  -  ASSESSMENT NOTE      Malnutrition:   % Weight Loss:  None noted  % Intake:  No decreased intake noted  Subcutaneous Fat Loss:  None observed  Muscle Loss:  None observed  Fluid Retention:  None noted    Malnutrition Diagnosis: Patient does not meet two of the above criteria necessary for diagnosing malnutrition        REASON FOR ASSESSMENT  Jacques Solis is a 88 year old female seen by Registered Dietitian for RD to assess/monitor TF.  RD to adjust if TF held for med administration.      NUTRITION HISTORY  - Information obtained from daughter as patient intubated and with dementia.    - Per daughter, patient has a care taker who is much more familiar with patient's home TF regimen.  She states that she herself has recently been hospitalized and therefore not really up on patient's regimen.  Daughter was able to indicate that patient was on a \"high protein\" formula up until about two weeks ago.  At that time, patient was switched to Nepro formula because of her declining renal function.  \"She was on some antibiotics that messed things up with the kidney and liver\".  Daughter is suspecting that the current TF orders were obtained from caretaker --> \"she was here last night\" (and noted that MD ordered TF regimen last night).  She also indicated that they are typically trying to avoid giving too much fluid as patient has issues with edema and pleural effusions.       CURRENT NUTRITION ORDERS  Diet Order:     NPO   TF resumed last night per MD (home regimen) --> Currently receiving Nepro at 15 mL/hr (648 kcal and 29 g protein).  Patient is advancing to goal as per EPIC --> Nepro at 35 mL/hr which will provide:  1512 kcal (24 kcal/kg), 68 g protein (1.1 g/kg), 135 g CHO, 11 g fiber, 613 mL H2O   Flushes 70 mL every 4 hours   Total fluid = 1033 mL (17 mL/kg)      PHYSICAL FINDINGS  Observed  No nutrition-related physical findings observed  Obtained from Chart/Interdisciplinary Team  None " "noted    ANTHROPOMETRICS  Height: 5'4\" (per Care Everywhere)  Weight: 83.7 kg (184#)(5/21)  BMI:  31.6 kg/m^2  Weight Status:  Obesity Grade I BMI 30-34.9  IBW: 54.5 kg   % IBW: 154%  Weight History: Daughter believes that her weight is way up (likely due to fluids) -- believes that her usual weight is around 160#    LABS  BUN 69 (H), Cr 1.16 (H), Mg 2.1 (NL), Phos 3.2 (NL) - CKD   , 172, 157, 165 on Med SSI + Lantus 12 units at HS     MEDICATIONS  Medications reviewed      ASSESSED NUTRITION NEEDS PER APPROVED PRACTICE GUIDELINES:    Dosing Weight 61.8 kg   Estimated Energy Needs: 9590-5454 kcals (20-25 Kcal/Kg)  Justification: obese and vented  Estimated Protein Needs: 50-75 grams protein (0.8-1.2 g pro/Kg)  Justification: CKD  Estimated Fluid Needs: 0566-9721 mL (1 mL/Kcal)  Justification: maintenance    MALNUTRITION:  % Weight Loss:  None noted  % Intake:  No decreased intake noted  Subcutaneous Fat Loss:  None observed  Muscle Loss:  None observed  Fluid Retention:  None noted    Malnutrition Diagnosis: Patient does not meet two of the above criteria necessary for diagnosing malnutrition    NUTRITION DIAGNOSIS:  Inadequate EN infusion R/t slow TF resumption AEB meeting ~40% needs via current TF regimen     NUTRITION INTERVENTIONS  Recommendations / Nutrition Prescription  Continue advancing TF towards goal of Nepro at 35 mL/hr as above   Continue flushes of 70 mL every 4 hours as above     Implementation  Nutrition education: Not appropriate at this time due to patient condition  Collaboration and Referral of Nutrition care:  Patient discussed today during interdisciplinary bedside rounds     Nutrition Goals  Nepro at 35 mL/hr will continue to meet % needs    MONITORING AND EVALUATION:  Progress towards goals will be monitored and evaluated per protocol and Practice Guidelines    Alma Arcos RD, LD, CNSC   Clinical Dietitian - [facility]                   "

## 2018-05-22 NOTE — PLAN OF CARE
Problem: Patient Care Overview  Goal: Plan of Care/Patient Progress Review  Discharge Planner OT   Patient plan for discharge: Unknown.  Current status: Order rec'd and chart reviewed. Discussed PLOF with daughter. Daughter reports patient had discharged home recently from [facility], where she has 24/7 CNA care, using a Adali for transfers from bed<>chair, but does sit EOB. CNA does ROM and A with all ADL.   Barriers to return to prior living situation: Defer to team/PT.  Recommendations for discharge: Defer to team/PT.   Rationale for recommendations: OT eval not indicated as patient has 24/7 CNA care at baseline A with ROM and ADL.        Entered by: Yamilet Harmon 05/22/2018 10:10 AM

## 2018-05-22 NOTE — PROGRESS NOTES
[facility]    Daily Progress Note  [facility]       Date of Admission:  5/21/2018    Assessment & Plan   Jacques Solis is a 88 year old female with past history A-fib, CHF, severe AS, DM II, asthma, chronic respiratory failure with vent dependence who was discharged home from [facility] on 5/18/18 but did not have appropriate home services arranged, but son also reporting a recent decline in respiratory status.    Acute on chronic hypoxic respiratory failure with vent dependence:  Chronic failure felt due to pleural effusions and overall debility.  Pulmonologist at [facility] felt she would chronically be vent dependent and weaning efforts were stopped.  CT chest in ED shows small to moderated loculated bilateral pleural effusions with atelectasis.  Also a 4 cm gil opacity in left upper lobe, but no SIRS criteria present.  - No acute issues, so will work to transfer again to [facility]  - SLP consult    Paroxysmal A-fib:  - continue PTA metoprolol (note son has been dosing this at 25 mg q8h)  - INR therapeutic (though discharge summary states goal of 2.5-3); continue coumadin, PharmD to dose    Hx of PE  - coumadin as above    Severe AS  Chronic diastolic CHF  TTE 3/2018 with EF 55-60%, severe AS (area of 0.6-0.7) and mild regurgitation, moderate MR.  Is reportedly not a TAVR candidate.  - continue PTA metolazone and torsemide    DM II:  Continue PTA Lanus 12 units daily with SSI    Asthma:  Continue PTA nebs.    Recent acute blood loss anemia:  Reportedly due to supratherapeutic lovenox in setting of PAULY.  Hgb at recent discharge 8.4, now improved to 10 g/dL.    Possible encephalopathy  Discharge summary states probable encephalopathy due to critical illness superimposed on some dementia.  Son denies any history of dementia.    - continue PTA Seroquel    Nonhealing right leg wound  Due to hematoma with necrotic eschar now s/p debridement  - WOC consult    Dysphagia  - continue PTA tube  "feeds with nutrition consult    DVT Prophylaxis: Warfarin  Code Status: Will verify, but has been limited DNR with shocks and meds but no chest compressions in very recent past.    Critical care time: 40 minutes    # Pain Assessment:  Current Pain Score 5/22/2018   Patient currently in pain? denies   Pain score (0-10) -   CPOT pain score 0   Jacques newell pain level was assessed and she currently denies pain.          Disposition: Expected discharge in 3-5 days once arrangement for appropriate home care resources can be arranged.    Fortino Perez    Primary Care Physician   Michael Mccarthy    Chief Complaint   Lack of home resources for management    History is obtained from the patient's son (who is an anesthesiologist) and chart review. Recent discharge summary from [place] said the following \"87-year-old female well-known to the critical care service with a history of some mild memory impairment, but has been essentially hospital-dependent for the last 9 months with severe aortic stenosis, not a candidate for TAVR, chronic diastolic heart failure, chronic bilateral recurrent pleural effusions, chronic vent dependence, history of pulmonary embolism, atrial fibrillation, peripheral artery disease with history of critical limb ischemia, who was transferred to [facility] from a long-term care facility. She was transferred due to acute blood loss anemia, elevated leukocytosis and acute renal failure. She was recently started on tobramycin for pseudomonas growing in her sputum.\"      History of Present Illness   Jacques Solis is a 88 year old female who presents with inadequate home support and possible acute on chronic hypoxic respiratory failure.  She has essentially been hospitalized since September 2017 when she was admitted to [facility] for critical limb ischemia and underwent balloon angioplasty which was complicated by femoral artery perforation with hemorrhagic shock.  She also had chronic respiratory " and was unable to wean from the vent and was trached and pegged on 11/13/17.  She was also noted to have some encephalopathy.  Ultimately she was discharged to [place]. She has essentially moved back and forth from [facility] and [facility] and then to [facility] and [facility] ever since.  Her most recent admission to [facility] 4/26-5/4 was due to acute blood loss anemia due to left thigh hematoma in addition to renal failure due to tobramycin toxicity for multi-drug resistant Pseudomonas.  She also underwent debridement of a right lower extremity wound due to an infected hematoma.  She was ultimately discharged to [facility], where she has stayed until discharge home on 5/18/18.  Pulmonology felt that she would not be able to wean and recommended discharge home or to TCU and son elected for discharge home.      He reports he was not set up with anyone who would be able to manage a home vent, reports not being given detailed sliding scale insulin instructions (no dosage parameters), was unable to obtain nebs due to lack of appropriate ICD code on discharge orders, did not have supplies at home to replace clogged tube feeding piece in addition to concerns about the competency of the home nurse.  Due to these issues he presented to [facility], unclear as to why he did not return to [facility], which is very familiar with the patient's case.  He reports his mother has otherwise been stable with the exception that she experienced several acute desaturation events over the past 2 days which resolved with suctioning.  He reports about 1 month ago she was able to tolerate being off the ventilator for about 16 hours/day and was able to speak with passy wilner valve.    Son reports his goals are for his mother to be able to wean from the vent for at least a few hours and be able to speak with valve and possible eat some food by mouth.  He understands that she may never be able to fully wean from the vent and is accepting of this, however, he wants  any reversible conditions treated.    Past Medical History    Paroxysmal atrial fibrillation  Chronic respiratory failure secondary to recurrent pleural effusions and deconditioning  Severe aortic stenosis  Chronic diastolic congestive heart failure  Diabetes mellitus type 2  Asthma   Dysphagia with feeding tube dependence  History of critical limb ischemia    Past Surgical History   D&C  Tracheostomy  PEG tube placement    Prior to Admission Medications   Prior to Admission Medications   Prescriptions Last Dose Informant Patient Reported? Taking?   Acetaminophen (TYLENOL PO)  at PRN Care Giver Yes Yes   Sig: Take 650 mg by mouth every 8 hours as needed for mild pain   Emollient (HYDROPHOR) OINT  at PRN Care Giver Yes Yes   Sig: Externally apply topically daily as needed   MELATONIN PO 2018 at HS Care Giver Yes Yes   Sig: Take 6 mg by mouth At Bedtime   METOPROLOL TARTRATE PO 2018 at NOON Care Giver Yes Yes   Si mg by Oral or G tube route every 8 hours    MetOLazone (ZAROXOLYN PO)  at PRN Care Giver Yes Yes   Sig: Take 2.5 mg by mouth daily as needed for other (BODY WEIGHT OVER 192 LBS)   Mirtazapine (REMERON PO) 2018 at HS Care Giver Yes Yes   Sig: 15 mg by Oral or G tube route At Bedtime   QUEtiapine Fumarate (SEROQUEL PO)  at PRN Care Giver Yes Yes   Sig: Take 6.25 mg by mouth every 8 hours as needed (AGITATION.)   SIMETHICONE-80 PO  at PRN Care Giver Yes Yes   Sig: Take 80 mg by mouth 4 times daily as needed for intestinal gas or other (FLATULENCE)   Torsemide (DEMADEX PO) 2018 at PM Care Giver Yes Yes   Sig: 10 mg by Oral or G tube route 2 times daily (before meals)   WARFARIN SODIUM PO 2018 at PM Care Giver Yes Yes   Sig: Take 4 mg by mouth daily   bacitracin 500 UNIT/GM OINT 2018 at AM Care Giver Yes Yes   Sig: Apply topically 2 times daily   bisacodyl (DULCOLAX) 10 MG Suppository  at PRN Care Giver Yes Yes   Sig: Place 10 mg rectally daily as needed for constipation    chlorhexidine (PERIDEX) 0.12 % solution 2018 at AM Care Giver Yes Yes   Sig: Take 15 mLs by mouth 2 times daily   fluticasone (FLONASE) 50 MCG/ACT spray 2018 at AM Care Giver Yes Yes   Sig: Spray 1 spray into both nostrils daily   insulin glargine (LANTUS SOLOSTAR) 100 UNIT/ML pen 2018 at HS Care Giver Yes Yes   Sig: Inject 12 Units Subcutaneous At Bedtime   insulin lispro (HUMALOG KWIKPEN) 100 UNIT/ML injection 2018 at NOON Care Giver Yes Yes   Sig: Inject 0.12 Units Subcutaneous every 6 hours NO SLIDING SCALE WAS GIVEN. PREVIOUS SS, WHILE AT [facility], WENT UP TO 16 UNITS.   ipratropium - albuterol 0.5 mg/2.5 mg/3 mL (DUONEB) 0.5-2.5 (3) MG/3ML neb solution  at PRN Care Giver Yes Yes   Sig: Take 1 vial by nebulization 2 times daily as needed for wheezing   lactobacillus TABS 2018 at AM Care Giver Yes Yes   Si tablets by Oral or G tube route 2 times daily   sennosides (SENOKOT) 8.8 MG/5ML syrup  at PRN Care Giver Yes Yes   Sig: Take 5 mLs by mouth daily as needed for constipation      Facility-Administered Medications: None     Allergies   Allergies   Allergen Reactions     Amikacin      Amiodarone      Codeine      Dexmedetomidine      Lisinopril      Penicillins      Sulfa Drugs        Social History   I have personally reviewed the social history with the patient showing no tobacco or alcohol use.    Family History   Son reports no significant family history    Review of Systems   The 10 point Review of Systems is negative other than noted in the HPI or here.     Physical Exam   Temp: 98  F (36.7  C) Temp src: Oral BP: 112/67 Pulse: 87 Heart Rate: 85 Resp: 10 SpO2: 100 % O2 Device: Mechanical Ventilator    Vital Signs with Ranges  Temp:  [98  F (36.7  C)-98.4  F (36.9  C)] 98  F (36.7  C)  Pulse:  [87] 87  Heart Rate:  [] 85  Resp:  [8-26] 10  BP: ()/() 112/67  FiO2 (%):  [40 %] 40 %  SpO2:  [99 %-100 %] 100 %  184 lbs 15.46 oz    Constitutional:  well nourished  female in mild distress due to agitation/delirium  Eyes: pupils equal, round and reactive to light, no icterus  HEENT: head normocephalic, atraumatic, mucous membranes moist, no cervical lymphadenopathy, trach in place  Respiratory: lungs clear to auscultation bilaterally, no crackles or wheezes, no tachypnea  Cardiovascular: regular rate and rhythm, normal S1/S2, no murmur, rubs or gallops  GI: abdomen soft, non-tender, non-distended, normal bowel sounds, no hepatosplenomegaly, PEG tube in place  Lymph/Hematologic: trace peripheral edema  Skin: No rash or bruising  Musculoskeletal: Extremities warm and well perfused  Neurologic: alert, follows commands, mouths answers to questions, cranial nerves grossly intact, gross motor movements intact      Data   Data reviewed today:  I personally reviewed no images or EKG's today.    Recent Labs  Lab 05/22/18  0410 05/21/18  1815   WBC 8.2 8.0   HGB 9.5* 10.1*   MCV 89 89    304   INR 2.31* 2.25*    137   POTASSIUM 3.7 3.7   CHLORIDE 98 98   CO2 35* 33*   BUN 69* 73*   CR 1.16* 1.12*   ANIONGAP 5 6   KRISTOFER 8.6 8.9   * 175*       Recent Results (from the past 24 hour(s))   Chest CT w/o contrast    Narrative    CT CHEST WITHOUT CONTRAST   5/21/2018 4:29 PM     HISTORY: Evaluate for pleural effusion.    COMPARISON: None.    TECHNIQUE: Without intravenous contrast, helical sections were  acquired through the lungs. Coronal reconstructions were generated.  Radiation dose for this scan was reduced using automated exposure  control, adjustment of the mA and/or kV according to the patient's  size, or iterative reconstruction technique.    FINDINGS: A tracheostomy tube is present with distal tube tip in the  mid trachea. Small to moderate-sized bilateral loculated-appearing  pleural effusions. Atelectasis is present in the lungs adjacent to the  effusions. 4.3 x 2.1 cm patchy opacity in the posterior aspect of the  left upper lobe (series 3 image 17). The lungs are  otherwise clear. A  very small amount of pericardial fluid. No enlarged lymph nodes in the  chest. Mild cardiomegaly. Atherosclerotic calcification in the  thoracic aorta and coronary arteries. Right upper extremity central  venous catheter with distal catheter tip in the superior vena cava.    Scan through the upper abdomen is significant for nonspecific diffuse  thickening of bilateral adrenal glands and a percutaneous gastric tube  with balloon tip in the gastric body.      Impression    IMPRESSION:   1. Small to moderate-sized loculated-appearing bilateral pleural  effusions with adjacent atelectasis.  2. A 4 cm patchy opacity in the left upper lobe is nonspecific, with  possibilities including pneumonia and scarring. Comparison with prior  chest would be useful, if available. If not available, a follow-up CT  could be considered in 3 months to confirm resolution of this finding.    LEN KEE MD   XR Chest Port 1 View    Narrative    XR CHEST PORT 1 VW  5/21/2018 11:04 PM     HISTORY: Endotracheal tube positioning.    COMPARISON: None.    FINDINGS: Supine portable chest. An ET tube has been placed with tip  approximately 3 cm above the donnell. A right PICC is in place with tip  projecting over the mid SVC. There is no pneumothorax. The heart is  enlarged. There are bilateral pleural effusions.      Impression    IMPRESSION:  1. ET tube 3 cm above the donnell.  2. Bilateral pleural effusions.    MD Fortino MCKINNEY MD  [facility] Intensivist Service

## 2018-05-22 NOTE — PROGRESS NOTES
"Care Coordination:    Placed call to [facility] to update that patient was back in hospital. Liaison, Nubia, had GHANSHYAM Patricio from [facility] call this writer. Sintia stated, \" Patient was discharge with 24 hour Home Care from [facility], they had all the supplies needed for home care. Son was contempting sending patient to [facility] but decided to take her home.    Judie Lloyd RN BSN  [facility] Care Coordinator  Phone [phone]    "

## 2018-05-22 NOTE — ED NOTES
[facility]  ED Nurse Handoff Report    ED Chief complaint: Shortness of Breath (pt vent dependent been home from [facility] after 3 month admission for resp failure and aortic stenosis. pt has been home 3 days and son thinks she has pleural effusions because he has been trying to wean her off vent and for past 3 days she has become more sob than vormal)      ED Diagnosis:   Final diagnoses:   Ventilator dependence (H)   Chronic renal impairment, unspecified CKD stage   Chronic anticoagulation       Code Status: Full Code    Allergies:   Allergies   Allergen Reactions     Amikacin      Amiodarone      Codeine      Dexmedetomidine      Lisinopril      Penicillins      Sulfa Drugs        Activity level - Baseline/Home:  Total Care    Activity Level - Current:   Total Care     Needed?: No    Isolation: No  Infection: Not Applicable    Bariatric?: No    Vital Signs:   Vitals:    05/21/18 1530 05/21/18 1545 05/21/18 1600 05/21/18 1700   BP:       Pulse:       Resp:       Temp:       TempSrc:       SpO2: 100% 100% 99% 98%       Cardiac Rhythm: ,        Pain level: 0-10 Pain Scale: 0    Is this patient confused?: No   Suicidality: Screened negative    Patient Report: Initial Complaint: SOB  Focused Assessment: 88 year old female with a history of respiratory failure and aortic stenosis who presents to the emergency department today via EMS for evaluation of shortness of breath. The patient was discharged on 5/18/18 from [facility] after a three month admission for respiratory failure and aortic stenosis. The patient's son notes that the patient is ventilator dependent, however he has been trying to wean her off of this. He states that when he does this she is extremely short of breath, though prior to admissions this did not occur. She also had no trouble speaking, eating, or drinking at that time, though she is now.   Son had list of concerns...When they were discharged from [facility] recently, no one  wrote the vent orders and no and managing the vent at this time, there is no sliding scale for her diabetes, and there were incomplete instructions about her tube feedings.  Pt has baseline labs and imaging - no changes there, however, she cannot go home without appropriate orders and plans for her care.  Needs to establish someone who can manage her vent at home, make sure that she has appropriate sliding scale and medications and durable medical equipment such as tube connections for her feedings.  I talked to Dr. Borden will be admitting the patient.  She will need to go to the ICU as she is on a vent and will need to get  and case management involved tomorrow to look at these issues and figure out how to get her home, which is the family's wishes.    Tests Performed: labs, CT  Abnormal Results:   Results for orders placed or performed during the hospital encounter of 05/21/18 (from the past 24 hour(s))   Chest CT w/o contrast    Narrative    CT CHEST WITHOUT CONTRAST   5/21/2018 4:29 PM     HISTORY: Evaluate for pleural effusion.    COMPARISON: None.    TECHNIQUE: Without intravenous contrast, helical sections were  acquired through the lungs. Coronal reconstructions were generated.  Radiation dose for this scan was reduced using automated exposure  control, adjustment of the mA and/or kV according to the patient's  size, or iterative reconstruction technique.    FINDINGS: A tracheostomy tube is present with distal tube tip in the  mid trachea. Small to moderate-sized bilateral loculated-appearing  pleural effusions. Atelectasis is present in the lungs adjacent to the  effusions. 4.3 x 2.1 cm patchy opacity in the posterior aspect of the  left upper lobe (series 3 image 17). The lungs are otherwise clear. A  very small amount of pericardial fluid. No enlarged lymph nodes in the  chest. Mild cardiomegaly. Atherosclerotic calcification in the  thoracic aorta and coronary arteries. Right upper  extremity central  venous catheter with distal catheter tip in the superior vena cava.    Scan through the upper abdomen is significant for nonspecific diffuse  thickening of bilateral adrenal glands and a previous gastric tube  with balloon tip in the gastric body.      Impression    IMPRESSION:   1. Small to moderate-sized loculated-appearing bilateral pleural  effusions with adjacent atelectasis.  2. A 4 cm patchy opacity in the left upper lobe is nonspecific, with  possibilities including pneumonia and scarring. Comparison with prior  chest would be useful, if available. If not available, a follow-up CT  could be considered in 3 months to confirm resolution of this finding.   Basic metabolic panel   Result Value Ref Range    Sodium 137 133 - 144 mmol/L    Potassium 3.7 3.4 - 5.3 mmol/L    Chloride 98 94 - 109 mmol/L    Carbon Dioxide 33 (H) 20 - 32 mmol/L    Anion Gap 6 3 - 14 mmol/L    Glucose 175 (H) 70 - 99 mg/dL    Urea Nitrogen 73 (H) 7 - 30 mg/dL    Creatinine 1.12 (H) 0.52 - 1.04 mg/dL    GFR Estimate 46 (L) >60 mL/min/1.7m2    GFR Estimate If Black 56 (L) >60 mL/min/1.7m2    Calcium 8.9 8.5 - 10.1 mg/dL   CBC with platelets + differential   Result Value Ref Range    WBC 8.0 4.0 - 11.0 10e9/L    RBC Count 3.77 (L) 3.8 - 5.2 10e12/L    Hemoglobin 10.1 (L) 11.7 - 15.7 g/dL    Hematocrit 33.4 (L) 35.0 - 47.0 %    MCV 89 78 - 100 fl    MCH 26.8 26.5 - 33.0 pg    MCHC 30.2 (L) 31.5 - 36.5 g/dL    RDW 18.8 (H) 10.0 - 15.0 %    Platelet Count 304 150 - 450 10e9/L    Diff Method Automated Method     % Neutrophils 78.8 %    % Lymphocytes 8.8 %    % Monocytes 9.4 %    % Eosinophils 1.8 %    % Basophils 0.8 %    % Immature Granulocytes 0.4 %    Nucleated RBCs 0 0 /100    Absolute Neutrophil 6.3 1.6 - 8.3 10e9/L    Absolute Lymphocytes 0.7 (L) 0.8 - 5.3 10e9/L    Absolute Monocytes 0.8 0.0 - 1.3 10e9/L    Absolute Eosinophils 0.1 0.0 - 0.7 10e9/L    Absolute Basophils 0.1 0.0 - 0.2 10e9/L    Abs Immature Granulocytes  0.0 0 - 0.4 10e9/L    Absolute Nucleated RBC 0.0    INR   Result Value Ref Range    INR 2.25 (H) 0.86 - 1.14       Treatments provided: nebs    Family Comments: son and daughter    OBS brochure/video discussed/provided to patient: N/A    ED Medications:   Medications   ipratropium - albuterol 0.5 mg/2.5 mg/3 mL (DUONEB) 0.5-2.5 (3) MG/3ML neb solution (not administered)   ipratropium - albuterol 0.5 mg/2.5 mg/3 mL (DUONEB) neb solution 3 mL (3 mLs Nebulization Given 5/21/18 1600)   metoprolol tartrate (LOPRESSOR) half-tab 37.5 mg (37.5 mg Per G Tube Given 5/21/18 1718)       Drips infusing?:  No    For the majority of the shift this patient was Green.   Interventions performed were xx.    Severe Sepsis OR Septic Shock Diagnosis Present: No      ED NURSE PHONE NUMBER: [id number]

## 2018-05-22 NOTE — PROGRESS NOTES
05/22/18 1132   General Information   Patient Profile Review See Profile for full history and prior level of function   Onset of Illness/Injury, or Date of Surgery - Date (Approximately 3 1/2 months ago per daughter report)   Start of Care Date 05/22/18   Referring Physician Dr. Perez   Patient/Family Goals Statement Family/patient agreed to POC for slow safe progression to increase PMV use.   Treatment Diagnosis Aphonia due to cuffed trach tube   Current Communication Mouthing words;Gestures   Comments Daughter was present today and stated patient has used both a PMV and cap in the past with varying lengths of time. Prior to admit 5/21 PMV was attempted with saturation decrease into the 60's with mucus plug suspected per daughter report. At baseline patient also has taken varying amounts of po intake in addition to tube feeding. Daughter was in agreement with slow careful progression to target goals for increased PMV use and po intake.  Detailed hx of swallow evaluations at [place] and [facility] not available at time of evaluation.     Evaluation   Type of Trach Bivona   Size of Trach 7 mm   Ventilator Mode CMV   Ventilator PEEP 5   Cuff Inflated at Onset of Evaluation Yes   Orders received to deflate cuff for PMSV trial Yes   Suctioning Required Before Cuff Deflation Yes   Oxygen saturation before cuff deflation 98 %   Respiratory rate before cuff deflation 18 Per Minute   Suctioning required after cuff deflation Yes   Oxygen saturation after cuff deflation (!) 89 %  (Cuff re-inflated; Additional suctioning provided after cuff re-inflated; Sats increased after additional suctioning and cuff then deflated again with good tolerance.  Sats remained above 92% with leak speech and PMV placement for 5 minutes)   Respiratory rate after cuff deflation 18 Per Minute   Voicing noted after PMSV placement: Yes   Respiratory Rate with PMSV placement (18-32)   Total amount of time with PMSV placement: 5  (Patient able to  vocalize at the 1-3 word level.)   Total amount of time with leak speech 1   Cuff re-inflated after PMSV trials: Yes   Prognosis / Impression   Criteria for Skilled Therapeutic Interventions Met Yes   SLP Diagnosis Aphonia due to cuffed trach tube   Rehab Potential Good, to achieve stated therapy goals   Therapy Frequency 5 times/wk   Predicted Duration of Therapy Intervention (days/wks) 1 week   Anticipated Discharge Disposition (24/7 hour supevision/assist; SLP eval and Tx)   Risks and Benefits of Treatment have been explained. yes   Patient, Family & other staff in agreement with plan of care yes   Clinical Impression Comments A Passy [place]uel Valve (PMV) speaking valve evaluation was completed this am with RT assistance while patient was on CMV mode in line with the ventilator.  After additional suctioning, patient tolerated cuff deflation and PMV placement for 5 minutes with stable saturation levels and RR 18-32.  Good voicing was achieved at the 1-3 word level.  Decreased verbal initiation was observed overall.  Plan to continue PMV use in communication Tx with SLP and RT supervision only at this time.  Will consider a bedside swallow evaluation over the next few days if patient is able to tolerate PMV placement for 30+ minutes.  Daughter was present today and stated patient has used both a PMV and cap in the past with varying lengths of time.  Prior to admit 5/21 PMV was attempted with saturation decrease into the 60's with mucus plug suspected per daughter report.  At baseline patient also has taken varying amounts of po intake in addition to tube feeding.  Daughter was in agreement with slow careful progression to target goals for increased PMV use and po intake.   Total Evaluation Time   Total Evaluation Time (Minutes) 15

## 2018-05-22 NOTE — PROGRESS NOTES
Pt transferred up from the ED. Pt was on home vent. Pt was placed on hospital vent with home settings per family request. Son took pt's home vent with him.    Ventilation Mode: CMV/AC  (Continuous Mandatory Ventilation/ Assist Control)  FiO2 (%): 40 %  Rate Set (breaths/minute): 10 breaths/min  Tidal Volume Set (mL): 360 mL  PEEP (cm H2O): 5 cmH2O  Oxygen Concentration (%): 40 %  Resp: 22      Will cont to monitor.  5/21/2018  Cely Valentin RRT

## 2018-05-22 NOTE — PROGRESS NOTES
[facility] ICU RESPIRATORY NOTE  Date of Admission:5/21/2018  Date of Intubation (most recent):5/21/2018  Reason for Mechanical Ventilation: vent dependent  Number of Days on Mechanical Ventilation: 2  Met Criteria for Pressure Support Trial: No  Length of Pressure Support Trial:  Reason for Stopping Pressure Support Trial:  Reason for No Pressure Support Trial: Per MD    Significant Events Today: None this shift    ABG Results:     Recent Labs  Lab 05/22/18  0127   PH 7.48*   PCO2 45   PO2 145*   HCO3 34*   O2PER 40     Ventilation Mode: CMV/AC  (Continuous Mandatory Ventilation/ Assist Control)  FiO2 (%): 40 %  Rate Set (breaths/minute): 10 breaths/min  Tidal Volume Set (mL): 360 mL  PEEP (cm H2O): 5 cmH2O  Oxygen Concentration (%): 40 %  Resp: 12      ETT appearance on chest x-ray: pt has trach in place. Clean, secure and patent.    Plan: continue full vent support.  5/22/2018  Nory Mejia

## 2018-05-22 NOTE — PROGRESS NOTES
CM    I:  JIMBO consult for discharge planning remains pending; awaiting plan of care decisions.     P:  Continue to follow and assist as needed.    CAMMY Ambrocio

## 2018-05-22 NOTE — PHARMACY-ANTICOAGULATION SERVICE
Clinical Pharmacy - Warfarin Dosing Consult     Pharmacy has been consulted to manage this patient s warfarin therapy.  Indication: Atrial Fibrillation  Therapy Goal: INR 2-3  Provider/Team: Hospitalist  Warfarin Prior to Admission: Yes  Warfarin PTA Regimen: 4mg daily except 3 days PTA 2mg, 2mg, 3mg.  Recent documented change in oral intake/nutrition: Unknown (on tube feeding)    INR   Date Value Ref Range Status   05/21/2018 2.25 (H) 0.86 - 1.14 Final       Recommend warfarin 3 mg today.  Pharmacy will monitor Jacques Solis daily and order warfarin doses to achieve specified goal.      Please contact pharmacy as soon as possible if the warfarin needs to be held for a procedure or if the warfarin goals change.

## 2018-05-22 NOTE — PHARMACY-ADMISSION MEDICATION HISTORY
Admission medication history interview status for the 5/21/2018  admission is complete. See EPIC admission navigator for prior to admission medications     Medication history source reliability:Moderate    Actions taken by pharmacist (provider contacted, etc): LIST COMPLETED USING DISCHARGE ORDERS FROM [facility]     Additional medication history information not noted on PTA med list : THERE IS NO SLIDING SCALE FOR HER HUMALOG, OTHER THAN 0-12 UNITS Q6 HOURS.    Medication reconciliation/reorder completed by provider prior to medication history? No    Time spent in this activity: 20 MINUTES    Prior to Admission medications    Medication Sig Last Dose Taking? Auth Provider   Acetaminophen (TYLENOL PO) Take 650 mg by mouth every 8 hours as needed for mild pain  at PRN Yes Unknown, Entered By History   bacitracin 500 UNIT/GM OINT Apply topically 2 times daily 5/21/2018 at AM Yes Unknown, Entered By History   bisacodyl (DULCOLAX) 10 MG Suppository Place 10 mg rectally daily as needed for constipation  at PRN Yes Unknown, Entered By History   chlorhexidine (PERIDEX) 0.12 % solution Take 15 mLs by mouth 2 times daily 5/21/2018 at AM Yes Unknown, Entered By History   Emollient (HYDROPHOR) OINT Externally apply topically daily as needed  at PRN Yes Unknown, Entered By History   fluticasone (FLONASE) 50 MCG/ACT spray Spray 1 spray into both nostrils daily 5/21/2018 at AM Yes Unknown, Entered By History   insulin glargine (LANTUS SOLOSTAR) 100 UNIT/ML pen Inject 12 Units Subcutaneous At Bedtime 5/20/2018 at HS Yes Unknown, Entered By History   insulin lispro (HUMALOG KWIKPEN) 100 UNIT/ML injection Inject 0.12 Units Subcutaneous every 6 hours NO SLIDING SCALE WAS GIVEN. PREVIOUS SS, WHILE AT [facility], WENT UP TO 16 UNITS. 5/21/2018 at NOON Yes Unknown, Entered By History   ipratropium - albuterol 0.5 mg/2.5 mg/3 mL (DUONEB) 0.5-2.5 (3) MG/3ML neb solution Take 1 vial by nebulization 2 times daily as needed for wheezing  at PRN  Yes Unknown, Entered By History   lactobacillus TABS 2 tablets by Oral or G tube route 2 times daily 5/21/2018 at AM Yes Unknown, Entered By History   MELATONIN PO Take 6 mg by mouth At Bedtime 5/20/2018 at HS Yes Unknown, Entered By History   MetOLazone (ZAROXOLYN PO) Take 2.5 mg by mouth daily as needed for other (BODY WEIGHT OVER 192 LBS)  at PRN Yes Unknown, Entered By History   METOPROLOL TARTRATE PO 37.5 mg by Oral or G tube route every 6 hours 5/21/2018 at NOON Yes Unknown, Entered By History   Mirtazapine (REMERON PO) 15 mg by Oral or G tube route At Bedtime 5/20/2018 at HS Yes Unknown, Entered By History   QUEtiapine Fumarate (SEROQUEL PO) Take 6.25 mg by mouth every 8 hours as needed (AGITATION.)  at PRN Yes Unknown, Entered By History   sennosides (SENOKOT) 8.8 MG/5ML syrup Take 5 mLs by mouth daily as needed for constipation  at PRN Yes Unknown, Entered By History   SIMETHICONE-80 PO Take 80 mg by mouth 4 times daily as needed for intestinal gas or other (FLATULENCE)  at PRN Yes Unknown, Entered By History   Torsemide (DEMADEX PO) 10 mg by Oral or G tube route 2 times daily (before meals) 5/20/2018 at PM Yes Unknown, Entered By History   WARFARIN SODIUM PO Take 4 mg by mouth daily 5/20/2018 at PM Yes Unknown, Entered By History

## 2018-05-22 NOTE — PROGRESS NOTES
[facility] Nurse Inpatient Wound Assessment     Initial Assessment of wound(s) on pt's:   Right lateral calf; left hand        Data:   Patient History:      per MD note(s): history A-fib, CHF, severe AS, DM II, asthma, chronic respiratory failure with vent dependence who was discharged home from [facility] on 18 but did not have appropriate home services arranged, but son also reporting a recent decline in respiratory status.     Marcio Assessment and sub scores:   Marcio Score  Av  Min: 12  Max: 15    Positioning: Pillows,     Mattress:  Standard , Atmos Air mattress    Current Diet / Nutrition:           Active Diet Order      NPO for Medical/Clinical Reasons Except for: No Exceptions    Labs:   Recent Labs   Lab Test  18   0410  18   0130   HGB  9.5*   --    INR  2.31*   --    WBC  8.2   --    A1C   --   6.0*       Wound Assessment (location):   Left lateral calf  Wound History:  debridement    Wound Base: red, shiney , granulation    Specific Dimensions (length x width x depth, in cm) :   9.6x5.2x1.4 cm    Tunneling:  N/A    Undermining: N/A    Palpation of the wound bed:  normal    Slough appearance:  none    Eschar appearance:  none    Periwound Skin: edema,      Color: normal and consistent with surrounding tissue    Temperature  normal     Drainage:  yellow     Odor: mild    Pain:  minimal , tender    Wound Assessment (location):   Right hand  Wound History:  Appears as skin tear    Wound Base: pink ,  moist    Specific Dimensions (length x width x depth, in cm) :   1.8x0.7 cm, superficial    Tunneling:  N/A    Undermining: N/A    Palpation of the wound bed:  normal    Slough appearance:  none    Eschar appearance:  none    Periwound Skin: fragile,      Color: normal and consistent with surrounding tissue    Temperature  normal     Drainage:  serosanguineous     Odor: none    Pain:  minimal , tender          Intervention:     Patient's chart evaluated.      Wound(s)  "Left lateral calf and right hand  1. Clean wounds with MicroKlenz Spray, pat dry  2. Paint with No Sting Skin Prep (#[id number])) and allow to dry thoroughly  3. Apply small dab of Iodosorb Gel (#[id number]) to wound bed only  4. To the right hand wound press a Mepilex  Border Dressing (#[id number]) to the area, making sure to conform nicely to skin curvatures   (begin placing the Mepilex at the most distal aspect first, smooth into place in an upward direction, then smooth side to side)      To the left lateral calf wound- apply Iodosorb gel to wound bed only, fill deficit with moist saline gauze, cover with ABD and secure with kerlix  5. Time and date dressing change and zeke with a \"T\" for treatment of a wound  6. Reposition pt every 1 to 2 hours when in bed and hourly when up to the chair to relieve pressure and promote perfusion to tissue  NOTE:  Iodosorb Gel should not be used longer than 14 days. After 14 days the gel should be stopped and a new plan established, this may mean only a simple, gauze dressing.  The Iodosorb Gel should be stopped after use on Ilana 10, 2018.            Discussed plan of care with Nurse             All patient / family questions answered NA          Assessment:       Surgical debridement wound to left calf has yellow exudate with shiney base and appears to be colonized. Skin tears to right hand.         Plan:     Nursing to notify the Provider(s) and re-consult the WOC Nurse if wound(s) deteriorate(s) or if the wound care plan needs reevaluation.    Plan of care for wound located on : Left lateral calf and right hand    WOC Nurse will return: weekly and prn     "

## 2018-05-22 NOTE — PLAN OF CARE
"Problem: Ventilation, Mechanical Invasive (Adult)  Goal: Signs and Symptoms of Listed Potential Problems Will be Absent, Minimized or Managed (Ventilation, Mechanical Invasive)  Signs and symptoms of listed potential problems will be absent, minimized or managed by discharge/transition of care (reference Ventilation, Mechanical Invasive (Adult) CPG).   Outcome: No Change  Pt restless and pulling at tubes.  Pt pulling at g-tube, pulling at vent tubing.  Mitts applied bilaterally to hands.  Pt less restless after 0400 and rested well.  Pt denies pain.   Pt with thick tan ETT secretions.  Minimal oral secretions.  Tube feeds started at 15 ml/hour with 70ml flushes every 4 hours.  Pt with lump/bruise to L arm and blanchable purplish bruising to L buttocks.  PT fights cares and mouthed \"let me die.  Stop\" when turning pt.  Pt not oriented to place, time, or situation.  Thought the year was 1986.  Pt incontinent of urine x2.  Purewick applied and 100 cc obtained from that.  Pt incontinent around the purewick.  Pt currently resting.  Pt in a-fib with cvr.  Son updated over phone.        "

## 2018-05-22 NOTE — PLAN OF CARE
Problem: Patient Care Overview  Goal: Plan of Care/Patient Progress Review    Discharge Planner SLP   Patient plan for discharge: Did not state  Current status: A Passy [place] Valve (PMV) speaking valve evaluation was completed this am with RT assistance while patient was on CMV mode in line with the ventilator.  After additional suctioning, patient tolerated cuff deflation and PMV placement for 5 minutes with stable saturation levels and RR 18-32.  Good voicing was achieved at the 1-3 word level.  Decreased verbal initiation was observed overall.  Plan to continue PMV use in communication Tx with SLP and RT supervision only at this time.  Will consider a bedside swallow evaluation over the next few days if patient is able to tolerate PMV placement for 30+ minutes.  Daughter was present today and stated patient has used both a PMV and cap in the past with varying lengths of time.  Prior to admit 5/21 PMV was attempted with saturation decrease into the 60's with mucus plug suspected per daughter report.  At baseline patient also has taken varying amounts of po intake in addition to tube feeding.  Daughter was in agreement with slow careful progression to target goals for increased PMV use and po intake.  Barriers to return to prior living situation: To be determined  Recommendations for discharge: To be determined; 24/7 hour assist/supervision, continued SLP services after discharge  Rationale for recommendations: SLP eval and Tx to maximize communication with PMV and initiate safe pleasure feeding       Entered by: Radha Sorenson 05/22/2018 11:25 AM

## 2018-05-23 ENCOUNTER — APPOINTMENT (OUTPATIENT)
Dept: PHYSICAL THERAPY | Facility: CLINIC | Age: 83
DRG: 207 | End: 2018-05-23
Payer: MEDICARE

## 2018-05-23 LAB
ALBUMIN SERPL-MCNC: 2.3 G/DL (ref 3.4–5)
ALP SERPL-CCNC: 224 U/L (ref 40–150)
ALT SERPL W P-5'-P-CCNC: 47 U/L (ref 0–50)
ANION GAP SERPL CALCULATED.3IONS-SCNC: 9 MMOL/L (ref 3–14)
AST SERPL W P-5'-P-CCNC: 49 U/L (ref 0–45)
BILIRUB SERPL-MCNC: 0.5 MG/DL (ref 0.2–1.3)
BUN SERPL-MCNC: 64 MG/DL (ref 7–30)
CALCIUM SERPL-MCNC: 8.6 MG/DL (ref 8.5–10.1)
CHLORIDE SERPL-SCNC: 96 MMOL/L (ref 94–109)
CO2 SERPL-SCNC: 34 MMOL/L (ref 20–32)
CREAT SERPL-MCNC: 1.2 MG/DL (ref 0.52–1.04)
ERYTHROCYTE [DISTWIDTH] IN BLOOD BY AUTOMATED COUNT: 18.8 % (ref 10–15)
GFR SERPL CREATININE-BSD FRML MDRD: 42 ML/MIN/1.7M2
GLUCOSE BLDC GLUCOMTR-MCNC: 153 MG/DL (ref 70–99)
GLUCOSE BLDC GLUCOMTR-MCNC: 154 MG/DL (ref 70–99)
GLUCOSE BLDC GLUCOMTR-MCNC: 154 MG/DL (ref 70–99)
GLUCOSE BLDC GLUCOMTR-MCNC: 186 MG/DL (ref 70–99)
GLUCOSE BLDC GLUCOMTR-MCNC: 193 MG/DL (ref 70–99)
GLUCOSE BLDC GLUCOMTR-MCNC: 224 MG/DL (ref 70–99)
GLUCOSE SERPL-MCNC: 170 MG/DL (ref 70–99)
HCT VFR BLD AUTO: 31.3 % (ref 35–47)
HGB BLD-MCNC: 9.5 G/DL (ref 11.7–15.7)
INR PPP: 3.11 (ref 0.86–1.14)
MCH RBC QN AUTO: 27.1 PG (ref 26.5–33)
MCHC RBC AUTO-ENTMCNC: 30.4 G/DL (ref 31.5–36.5)
MCV RBC AUTO: 89 FL (ref 78–100)
PLATELET # BLD AUTO: 284 10E9/L (ref 150–450)
POTASSIUM SERPL-SCNC: 3.6 MMOL/L (ref 3.4–5.3)
PROCALCITONIN SERPL-MCNC: 0.41 NG/ML
PROT SERPL-MCNC: 6.4 G/DL (ref 6.8–8.8)
RBC # BLD AUTO: 3.51 10E12/L (ref 3.8–5.2)
SODIUM SERPL-SCNC: 139 MMOL/L (ref 133–144)
WBC # BLD AUTO: 9.6 10E9/L (ref 4–11)

## 2018-05-23 PROCEDURE — G8981 BODY POS CURRENT STATUS: HCPCS | Mod: GP,CM

## 2018-05-23 PROCEDURE — 85610 PROTHROMBIN TIME: CPT | Performed by: INTERNAL MEDICINE

## 2018-05-23 PROCEDURE — 97530 THERAPEUTIC ACTIVITIES: CPT | Mod: GP | Performed by: PHYSICAL THERAPIST

## 2018-05-23 PROCEDURE — 40000193 ZZH STATISTIC PT WARD VISIT: Performed by: PHYSICAL THERAPIST

## 2018-05-23 PROCEDURE — 40000008 ZZH STATISTIC AIRWAY CARE

## 2018-05-23 PROCEDURE — 27210432 ZZH NUTRITION PRODUCT RENAL BASIC LITER

## 2018-05-23 PROCEDURE — 85027 COMPLETE CBC AUTOMATED: CPT | Performed by: INTERNAL MEDICINE

## 2018-05-23 PROCEDURE — G8979 MOBILITY GOAL STATUS: HCPCS | Mod: GP,CM

## 2018-05-23 PROCEDURE — A9270 NON-COVERED ITEM OR SERVICE: HCPCS | Mod: GY | Performed by: HOSPITALIST

## 2018-05-23 PROCEDURE — 99291 CRITICAL CARE FIRST HOUR: CPT | Performed by: INTERNAL MEDICINE

## 2018-05-23 PROCEDURE — 80053 COMPREHEN METABOLIC PANEL: CPT | Performed by: INTERNAL MEDICINE

## 2018-05-23 PROCEDURE — 20000003 ZZH R&B ICU

## 2018-05-23 PROCEDURE — 84145 PROCALCITONIN (PCT): CPT | Performed by: INTERNAL MEDICINE

## 2018-05-23 PROCEDURE — G8982 BODY POS GOAL STATUS: HCPCS | Mod: GP,CM

## 2018-05-23 PROCEDURE — 00000146 ZZHCL STATISTIC GLUCOSE BY METER IP

## 2018-05-23 PROCEDURE — 40000275 ZZH STATISTIC RCP TIME EA 10 MIN

## 2018-05-23 PROCEDURE — 97161 PT EVAL LOW COMPLEX 20 MIN: CPT | Mod: GP | Performed by: PHYSICAL THERAPIST

## 2018-05-23 PROCEDURE — 40000239 ZZH STATISTIC VAT ROUNDS

## 2018-05-23 PROCEDURE — 25000131 ZZH RX MED GY IP 250 OP 636 PS 637: Mod: GY | Performed by: HOSPITALIST

## 2018-05-23 PROCEDURE — 25000132 ZZH RX MED GY IP 250 OP 250 PS 637: Mod: GY | Performed by: HOSPITALIST

## 2018-05-23 PROCEDURE — 94003 VENT MGMT INPAT SUBQ DAY: CPT

## 2018-05-23 RX ORDER — ACETAMINOPHEN 160 MG
TABLET,DISINTEGRATING ORAL 3 TIMES DAILY PRN
Status: DISCONTINUED | OUTPATIENT
Start: 2018-05-23 | End: 2018-06-14 | Stop reason: HOSPADM

## 2018-05-23 RX ADMIN — INSULIN ASPART 2 UNITS: 100 INJECTION, SOLUTION INTRAVENOUS; SUBCUTANEOUS at 20:28

## 2018-05-23 RX ADMIN — TORSEMIDE 10 MG: 10 TABLET ORAL at 15:15

## 2018-05-23 RX ADMIN — INSULIN ASPART 1 UNITS: 100 INJECTION, SOLUTION INTRAVENOUS; SUBCUTANEOUS at 08:06

## 2018-05-23 RX ADMIN — FLUTICASONE PROPIONATE 1 SPRAY: 50 SPRAY, METERED NASAL at 08:02

## 2018-05-23 RX ADMIN — METOPROLOL TARTRATE 25 MG: 25 TABLET, FILM COATED ORAL at 15:15

## 2018-05-23 RX ADMIN — Medication 6.25 MG: at 15:15

## 2018-05-23 RX ADMIN — Medication 1 CAPSULE: at 20:19

## 2018-05-23 RX ADMIN — CHLORHEXIDINE GLUCONATE 15 ML: 1.2 RINSE ORAL at 08:02

## 2018-05-23 RX ADMIN — ACETAMINOPHEN 650 MG: 160 SUSPENSION ORAL at 10:45

## 2018-05-23 RX ADMIN — TORSEMIDE 10 MG: 10 TABLET ORAL at 08:02

## 2018-05-23 RX ADMIN — INSULIN ASPART 2 UNITS: 100 INJECTION, SOLUTION INTRAVENOUS; SUBCUTANEOUS at 11:39

## 2018-05-23 RX ADMIN — HYDROGEN PEROXIDE: 2.65 LIQUID TOPICAL at 10:00

## 2018-05-23 RX ADMIN — Medication 1 CAPSULE: at 08:02

## 2018-05-23 RX ADMIN — METOPROLOL TARTRATE 25 MG: 25 TABLET, FILM COATED ORAL at 23:16

## 2018-05-23 RX ADMIN — Medication 6.25 MG: at 00:04

## 2018-05-23 RX ADMIN — INSULIN ASPART 1 UNITS: 100 INJECTION, SOLUTION INTRAVENOUS; SUBCUTANEOUS at 04:49

## 2018-05-23 RX ADMIN — Medication 6.25 MG: at 23:16

## 2018-05-23 RX ADMIN — INSULIN GLARGINE 12 UNITS: 100 INJECTION, SOLUTION SUBCUTANEOUS at 22:12

## 2018-05-23 RX ADMIN — MELATONIN TAB 3 MG 6 MG: 3 TAB at 22:11

## 2018-05-23 RX ADMIN — CHLORHEXIDINE GLUCONATE 15 ML: 1.2 RINSE ORAL at 20:20

## 2018-05-23 RX ADMIN — METOPROLOL TARTRATE 25 MG: 25 TABLET, FILM COATED ORAL at 08:02

## 2018-05-23 RX ADMIN — INSULIN ASPART 1 UNITS: 100 INJECTION, SOLUTION INTRAVENOUS; SUBCUTANEOUS at 16:14

## 2018-05-23 RX ADMIN — MIRTAZAPINE 15 MG: 7.5 TABLET ORAL at 22:11

## 2018-05-23 RX ADMIN — HYDROGEN PEROXIDE: 2.65 LIQUID TOPICAL at 04:38

## 2018-05-23 RX ADMIN — INSULIN ASPART 1 UNITS: 100 INJECTION, SOLUTION INTRAVENOUS; SUBCUTANEOUS at 00:13

## 2018-05-23 RX ADMIN — METOPROLOL TARTRATE 25 MG: 25 TABLET, FILM COATED ORAL at 00:05

## 2018-05-23 ASSESSMENT — PAIN DESCRIPTION - DESCRIPTORS: DESCRIPTORS: ACHING

## 2018-05-23 NOTE — PROGRESS NOTES
[facility] ICU RESPIRATORY NOTE  Date of Admission: 5/21/2018  Date of Intubation (most recent): 5/21/18  Reason for Mechanical Ventilation:   Number of Days on Mechanical Ventilation: 3  Met Criteria for Pressure Support Trial: Yes  Length of Pressure Support Trial:  Reason for Stopping Pressure Support Trial:  Reason for No Pressure Support Trial: Per MD    Ventilation Mode: CMV/AC  (Continuous Mandatory Ventilation/ Assist Control)  FiO2 (%): 40 %  Rate Set (breaths/minute): 10 breaths/min  Tidal Volume Set (mL): 360 mL  PEEP (cm H2O): 5 cmH2O  Oxygen Concentration (%): 40 %  Resp: 12    Significant Events Today:    ABG Results:    ETT appearance on chest x-ray:    Plan:    Continue ventilatory support.  PMV with speech.

## 2018-05-23 NOTE — PLAN OF CARE
Problem: PT General Care Plan  Goal: Transfer (PT)  PT Transfer   Outcome: Change based on patient need/priority Date Met: 05/23/18

## 2018-05-23 NOTE — PLAN OF CARE
Problem: Respiratory Distress Syndrome (,NICU)  Goal: Signs and Symptoms of Listed Potential Problems Will be Absent, Minimized or Managed (Respiratory Distress Syndrome)  Signs and symptoms of listed potential problems will be absent, minimized or managed by discharge/transition of care (reference Respiratory Distress Syndrome (,NICU) CPG).   Outcome: No Change  Pt admitted with bilateral pulmonary effusions. Pt remains vent dependent (home vent). PM valve trial for about 10 minutes. Pt dyspneic with any exertion. Scant secretions with suctioning. Afib with CVR. VSS. TF for nutrition with existing Peg tube. WOCN consult today for preexisting wound to right hand and left calf (see orders). No complaints of discomfort. Continue aggressive pulmonary hygiene, hemodynamic stability, skin ulcer preventions and fluid/electrolyte balancing.

## 2018-05-23 NOTE — PROGRESS NOTES
[facility]    Daily Progress Note  [facility]       Date of Admission:  5/21/2018    Assessment & Plan   Jacques Solis is a 88 year old female with past history A-fib, CHF, severe AS, DM II, asthma, chronic respiratory failure with vent dependence who was discharged home from [facility] on 5/18/18 but did not have appropriate home services arranged, but son also reporting a recent decline in respiratory status.    Acute on chronic hypoxic respiratory failure with vent dependence:  Chronic failure felt due to pleural effusions and overall debility.  Pulmonologist at [facility] felt she would chronically be vent dependent and weaning efforts were stopped.  CT chest in ED shows small to moderated loculated bilateral pleural effusions with atelectasis.  Also a 4 cm gil opacity in left upper lobe, but no SIRS criteria present.  - No acute issues, so will work to arrange discharge to home. Social work and care coordination involved  - SLP consult    Paroxysmal A-fib:  - continue PTA metoprolol (note son has been dosing this at 25 mg q8h)  - INR therapeutic (though discharge summary states goal of 2.5-3); continue coumadin, PharmD to dose    Hx of PE  - coumadin as above    Severe AS  Chronic diastolic CHF  TTE 3/2018 with EF 55-60%, severe AS (area of 0.6-0.7) and mild regurgitation, moderate MR.  Is reportedly not a TAVR candidate.  - continue PTA metolazone and torsemide    DM II:  Continue PTA Lanus 12 units daily with SSI    Asthma:  Continue PTA nebs.    Recent acute blood loss anemia:  Reportedly due to supratherapeutic lovenox in setting of PAULY.  Hgb at recent discharge 8.4, now improved to 10 g/dL.    Possible encephalopathy  Discharge summary states probable encephalopathy due to critical illness superimposed on some dementia.  Son denies any history of dementia.    - continue PTA Seroquel    Nonhealing right leg wound  Due to hematoma with necrotic eschar now s/p debridement  -  "WOC consult    Dysphagia  - continue PTA tube feeds with nutrition consult    DVT Prophylaxis: Warfarin  Code Status: Will verify, but has been limited DNR with shocks and meds but no chest compressions in very recent past. Now full code. Will verify with family.    # Pain Assessment:  Current Pain Score 5/23/2018   Patient currently in pain? denies   Pain score (0-10) -   Pain location -   Pain descriptors -   CPOT pain score 0   Jacques newell pain level was assessed and she currently denies pain.          Disposition: Expected discharge in 3-5 days once arrangement for appropriate home care resources can be arranged.    Critical care time: 35 minutes    Fortino Perez    Primary Care Physician   Michael Mccarthy    Chief Complaint   Lack of home resources for management    History is obtained from the patient's son (who is an anesthesiologist) and chart review. Recent discharge summary from [place] said the following \"87-year-old female well-known to the critical care service with a history of some mild memory impairment, but has been essentially hospital-dependent for the last 9 months with severe aortic stenosis, not a candidate for TAVR, chronic diastolic heart failure, chronic bilateral recurrent pleural effusions, chronic vent dependence, history of pulmonary embolism, atrial fibrillation, peripheral artery disease with history of critical limb ischemia, who was transferred to [facility] from a long-term care facility. She was transferred due to acute blood loss anemia, elevated leukocytosis and acute renal failure. She was recently started on tobramycin for pseudomonas growing in her sputum.\"      History of Present Illness   Jacques Solis is a 88 year old female who presents with inadequate home support and possible acute on chronic hypoxic respiratory failure.  She has essentially been hospitalized since September 2017 when she was admitted to [facility] for critical limb ischemia and underwent " balloon angioplasty which was complicated by femoral artery perforation with hemorrhagic shock.  She also had chronic respiratory and was unable to wean from the vent and was trached and pegged on 11/13/17.  She was also noted to have some encephalopathy.  Ultimately she was discharged to [facility]. She has essentially moved back and forth from [facility] and [facility] and then to [facility] and [facility] ever since.  Her most recent admission to [facility] 4/26-5/4 was due to acute blood loss anemia due to left thigh hematoma in addition to renal failure due to tobramycin toxicity for multi-drug resistant Pseudomonas.  She also underwent debridement of a right lower extremity wound due to an infected hematoma.  She was ultimately discharged to [facility], where she has stayed until discharge home on 5/18/18.  Pulmonology felt that she would not be able to wean and recommended discharge home or to TCU and son elected for discharge home.      He reports he was not set up with anyone who would be able to manage a home vent, reports not being given detailed sliding scale insulin instructions (no dosage parameters), was unable to obtain nebs due to lack of appropriate ICD code on discharge orders, did not have supplies at home to replace clogged tube feeding piece in addition to concerns about the competency of the home nurse.  Due to these issues he presented to [facility], unclear as to why he did not return to [facility], which is very familiar with the patient's case.  He reports his mother has otherwise been stable with the exception that she experienced several acute desaturation events over the past 2 days which resolved with suctioning.  He reports about 1 month ago she was able to tolerate being off the ventilator for about 16 hours/day and was able to speak with passy wilner valve.    Son reports his goals are for his mother to be able to wean from the vent for at least a few hours and be able to speak with valve and possible eat some  food by mouth.  He understands that she may never be able to fully wean from the vent and is accepting of this, however, he wants any reversible conditions treated.    Past Medical History    Paroxysmal atrial fibrillation  Chronic respiratory failure secondary to recurrent pleural effusions and deconditioning  Severe aortic stenosis  Chronic diastolic congestive heart failure  Diabetes mellitus type 2  Asthma   Dysphagia with feeding tube dependence  History of critical limb ischemia    Past Surgical History   D&C  Tracheostomy  PEG tube placement    Prior to Admission Medications   Prior to Admission Medications   Prescriptions Last Dose Informant Patient Reported? Taking?   Acetaminophen (TYLENOL PO)  at PRN Care Giver Yes Yes   Sig: Take 650 mg by mouth every 8 hours as needed for mild pain   Emollient (HYDROPHOR) OINT  at PRN Care Giver Yes Yes   Sig: Externally apply topically daily as needed   MELATONIN PO 2018 at HS Care Giver Yes Yes   Sig: Take 6 mg by mouth At Bedtime   METOPROLOL TARTRATE PO 2018 at NOON Care Giver Yes Yes   Si mg by Oral or G tube route every 8 hours    MetOLazone (ZAROXOLYN PO)  at PRN Care Giver Yes Yes   Sig: Take 2.5 mg by mouth daily as needed for other (BODY WEIGHT OVER 192 LBS)   Mirtazapine (REMERON PO) 2018 at HS Care Giver Yes Yes   Sig: 15 mg by Oral or G tube route At Bedtime   QUEtiapine Fumarate (SEROQUEL PO)  at PRN Care Giver Yes Yes   Sig: Take 6.25 mg by mouth every 8 hours as needed (AGITATION.)   SIMETHICONE-80 PO  at PRN Care Giver Yes Yes   Sig: Take 80 mg by mouth 4 times daily as needed for intestinal gas or other (FLATULENCE)   Torsemide (DEMADEX PO) 2018 at PM Care Giver Yes Yes   Sig: 10 mg by Oral or G tube route 2 times daily (before meals)   WARFARIN SODIUM PO 2018 at PM Care Giver Yes Yes   Sig: Take 4 mg by mouth daily   bacitracin 500 UNIT/GM OINT 2018 at AM Care Giver Yes Yes   Sig: Apply topically 2 times daily    bisacodyl (DULCOLAX) 10 MG Suppository  at PRN Care Giver Yes Yes   Sig: Place 10 mg rectally daily as needed for constipation   chlorhexidine (PERIDEX) 0.12 % solution 2018 at AM Care Giver Yes Yes   Sig: Take 15 mLs by mouth 2 times daily   fluticasone (FLONASE) 50 MCG/ACT spray 2018 at AM Care Giver Yes Yes   Sig: Spray 1 spray into both nostrils daily   insulin glargine (LANTUS SOLOSTAR) 100 UNIT/ML pen 2018 at HS Care Giver Yes Yes   Sig: Inject 12 Units Subcutaneous At Bedtime   insulin lispro (HUMALOG KWIKPEN) 100 UNIT/ML injection 2018 at NOON Care Giver Yes Yes   Sig: Inject 0.12 Units Subcutaneous every 6 hours NO SLIDING SCALE WAS GIVEN. PREVIOUS SS, WHILE AT [facility], WENT UP TO 16 UNITS.   ipratropium - albuterol 0.5 mg/2.5 mg/3 mL (DUONEB) 0.5-2.5 (3) MG/3ML neb solution  at PRN Care Giver Yes Yes   Sig: Take 1 vial by nebulization 2 times daily as needed for wheezing   lactobacillus TABS 2018 at AM Care Giver Yes Yes   Si tablets by Oral or G tube route 2 times daily   sennosides (SENOKOT) 8.8 MG/5ML syrup  at PRN Care Giver Yes Yes   Sig: Take 5 mLs by mouth daily as needed for constipation      Facility-Administered Medications: None     Allergies   Allergies   Allergen Reactions     Amikacin      Amiodarone      Codeine      Dexmedetomidine      Lisinopril      Penicillins      Sulfa Drugs        Social History   I have personally reviewed the social history with the patient showing no tobacco or alcohol use.    Family History   Son reports no significant family history        Physical Exam   Temp: 98.1  F (36.7  C) Temp src: Bladder BP: 124/81   Heart Rate: 73 Resp: 16 SpO2: 100 % O2 Device: Mechanical Ventilator    Vital Signs with Ranges  Temp:  [97.9  F (36.6  C)-98.6  F (37  C)] 98.1  F (36.7  C)  Heart Rate:  [] 73  Resp:  [10-48] 16  BP: ()/() 124/81  FiO2 (%):  [40 %] 40 %  SpO2:  [96 %-100 %] 100 %  184 lbs 4.87 oz    Constitutional:  well  nourished female in mild distress due to agitation/delirium  Eyes: pupils equal, round and reactive to light, no icterus  HEENT: head normocephalic, atraumatic, mucous membranes moist, no cervical lymphadenopathy, trach in place  Respiratory: lungs clear to auscultation bilaterally, no crackles or wheezes, no tachypnea  Cardiovascular: regular rate and rhythm, normal S1/S2, no murmur, rubs or gallops  GI: abdomen soft, non-tender, non-distended, normal bowel sounds, no hepatosplenomegaly, PEG tube in place  Lymph/Hematologic: trace peripheral edema  Skin: No rash or bruising  Musculoskeletal: Extremities warm and well perfused  Neurologic: awake, follows commands, mouths answers to questions, cranial nerves grossly intact, gross motor movements intact      Intake/Output Summary (Last 24 hours) at 05/23/18 1742  Last data filed at 05/23/18 1600   Gross per 24 hour   Intake             1470 ml   Output             1170 ml   Net              300 ml       Data   Data reviewed today:  I personally reviewed no images or EKG's today.    Recent Labs  Lab 05/23/18  0443 05/22/18  0410 05/21/18  1815   WBC 9.6 8.2 8.0   HGB 9.5* 9.5* 10.1*   MCV 89 89 89    295 304   INR 3.11* 2.31* 2.25*    138 137   POTASSIUM 3.6 3.7 3.7   CHLORIDE 96 98 98   CO2 34* 35* 33*   BUN 64* 69* 73*   CR 1.20* 1.16* 1.12*   ANIONGAP 9 5 6   KRISTOFER 8.6 8.6 8.9   * 172* 175*   ALBUMIN 2.3*  --   --    PROTTOTAL 6.4*  --   --    BILITOTAL 0.5  --   --    ALKPHOS 224*  --   --    ALT 47  --   --    AST 49*  --   --        No results found for this or any previous visit (from the past 24 hour(s)).  Fortino Perez MD  [facility] Intensivist Service

## 2018-05-23 NOTE — PROGRESS NOTES
Care Coordination:    Received call from ICU for discharge planning. They are hoping to discharge patient tomorrow if all is set up, talked with daughter, Jen. They would like things 'straightened up' for discharge. Sliding scale, and they would like a new Home Care Agency. Placed call to [facility] [phone], left message for Fareed to call writer back.    Placed call to [facility],# [phone] L/M for Ro to call writer back.     Placed call to [facility] for services at [phone]. Spoke with Olivia, they currently have a waiting list of 20 people that need trach vent care at home and require at least 12-24 hour care.    Per daughter, they use [facility] for [facility].      Judie Llody RN BSN  [facility] Care Coordinator  Phone [phone]

## 2018-05-23 NOTE — PROGRESS NOTES
05/23/18 0831   Quick Adds   Type of Visit Initial PT Evaluation   Living Environment   Lives With child(yogi), adult;facility resident   Living Environment Comment Pt apparently originally lived with adult children. Has been living in several different care facilities since fall of 2017. Was recently discharged from [facility] on 5/18 and went home with adult son before starting to have more medical complications there.   Self-Care   Usual Activity Tolerance poor   Current Activity Tolerance poor   Regular Exercise no   Equipment Currently Used at Home lift device   Activity/Exercise/Self-Care Comment Pt uses Adali lift at baseline although is able to sit EOB- reported by daughter   Functional Level Prior   Ambulation 4-->completely dependent   Transferring 4-->completely dependent   Toileting 4-->completely dependent   Bathing 4-->completely dependent   Dressing 4-->completely dependent   Eating 4-->completely dependent   Communication 0-->understands/communicates without difficulty   Cognition 1 - attention or memory deficits   Fall history within last six months no   Which of the above functional risks had a recent onset or change? ambulation;transferring   Prior Functional Level Comment Pt has been completely dependent with all mobility. Able to sit EOB with assist.   General Information   Onset of Illness/Injury or Date of Surgery - Date 05/21/18   Referring Physician Harvinder Nolasco MD   Patient/Family Goals Statement Not stated   Pertinent History of Current Problem (include personal factors and/or comorbidities that impact the POC) Pt was admitted with acute on chronic respiratory failure with A-fib after being discharged home without the necessary care resources. PMH of A-fib, CHF, severe AS, DM II, asthma, chronic respiratory failure with vent dependence.   Precautions/Limitations no known precautions/limitations   Weight-Bearing Status - LUE full weight-bearing   Weight-Bearing Status - RUE full  weight-bearing   Weight-Bearing Status - LLE full weight-bearing   Weight-Bearing Status - RLE full weight-bearing   General Observations Pt on trach vent. Pt is able to follow all instructions of nurse for cares/suctioning.   Cognitive Status Examination   Orientation person  (Difficult to tell otherwise as pt has difficulty speaking)   Level of Consciousness alert;lethargic/somnolent;agitated  (Pt was lethargic at first, a bit agitated with activity)   Follows Commands and Answers Questions 75% of the time   Cognitive Comment Pt is unable to communicate very well, is able to mouth words and demonstrates agitation with facial and UE expresions   Integumentary/Edema   Integumentary/Edema Comments Dressed wounds on L hand area and R LE   Posture    Posture Forward head position;Protracted shoulders   Range of Motion (ROM)   ROM Comment WFL although pt resistant to PROM of PT in both UE/LEs   Strength   Strength Comments Difficult to formally test, pt unable to fully lift limbs against gravity when cued but demonstrating good resistance to some of the ROM PT attempted   Bed Mobility   Bed Mobility Comments Pt dependent with bed mobility   Transfer Skills   Transfer Comments Pt completes bed>support chair with ceiling lift and Ax3- this is apparently pt's baseline as she uses kerrie for transfers at baseline   General Therapy Interventions   Planned Therapy Interventions bed mobility training;balance training;ROM;strengthening;stretching;transfer training;progressive activity/exercise;home program guidelines   Clinical Impression   Criteria for Skilled Therapeutic Intervention yes, treatment indicated   PT Diagnosis De-conditioning following chronic respiratory failure   Influenced by the following impairments De-conditioning, poor endurance, trach vent   Functional limitations due to impairments Heavy dependence with all mobility/transfers, need for chronic vent   Clinical Presentation Stable/Uncomplicated   Clinical  "Presentation Rationale Pt is medically stable and close to baseline mobility   Clinical Decision Making (Complexity) Low complexity   Therapy Frequency` 5 times/week   Predicted Duration of Therapy Intervention (days/wks) 5 days   Anticipated Discharge Disposition Transitional Care Facility   Risk & Benefits of therapy have been explained Yes   Patient, Family & other staff in agreement with plan of care Yes   [facility] AM-PAC  \"6 Clicks\" V.2 Basic Mobility Inpatient Short Form   1. Turning from your back to your side while in a flat bed without using bedrails? 1 - Total   2. Moving from lying on your back to sitting on the side of a flat bed without using bedrails? 1 - Total   3. Moving to and from a bed to a chair (including a wheelchair)? 1 - Total   4. Standing up from a chair using your arms (e.g., wheelchair, or bedside chair)? 1 - Total   5. To walk in hospital room? 1 - Total   6. Climbing 3-5 steps with a railing? 1 - Total   Basic Mobility Raw Score (Score out of 24.Lower scores equate to lower levels of function) 6   Total Evaluation Time   Total Evaluation Time (Minutes) 10     "

## 2018-05-23 NOTE — PROGRESS NOTES
[facility] ICU RESPIRATORY NOTE  Date of Admission:5/21/2018  Date of Intubation (most recent):5/21/2018  Reason for Mechanical Ventilation: vent dependent  Number of Days on Mechanical Ventilation: 3  Met Criteria for Pressure Support Trial: yES  Length of Pressure Support Trial:  Reason for Stopping Pressure Support Trial:  Reason for No Pressure Support Trial: Per MD     Significant Events Today: None this shift     ABG Results:     Recent Labs  Lab 05/22/18  0127   PH 7.48*   PCO2 45   PO2 145*   HCO3 34*   O2PER 40         Ventilation Mode: CMV/AC  (Continuous Mandatory Ventilation/ Assist Control)  FiO2 (%): 40 %  Rate Set (breaths/minute): 10 breaths/min  Tidal Volume Set (mL): 360 mL  PEEP (cm H2O): 5 cmH2O  Oxygen Concentration (%): 40 %  Resp: 31      ETT appearance on chest x-ray: pt has trach in place. Clean, secure and patent.     Plan: continue full vent support  5/23/2018  Emily Jasso

## 2018-05-23 NOTE — PLAN OF CARE
Problem: Respiratory Distress Syndrome (,NICU)  Goal: Signs and Symptoms of Listed Potential Problems Will be Absent, Minimized or Managed (Respiratory Distress Syndrome)  Signs and symptoms of listed potential problems will be absent, minimized or managed by discharge/transition of care (reference Respiratory Distress Syndrome (,NICU) CPG).   Outcome: No Change  Pt remains ventilator dependent, no weaning trials today. Pt dyspneic and anxious with any exertion. Small amount of thick creamy secretions with suctioning. Pt OOB by ceiling lift to chair today and tolerated for about an hour. Aggressive pulmonary hygiene continued. Afib with CVR while comfortable, HR up to 150's with agitation. BP's controlled with oral antihypertensive agents. Pt with elevating creatinine, MD's aware - monitoring. Strict fluid and electrolyte balancing. Pt fed by TF via Peg tube. Plan to coordinate for discharge to home with support tomorrow or soon. Daughter updated and at bedside all day. Continue wound care, skin ulcer preventions, hemodynamic stability, pulmonary support, comfort measures and increasing activity as tolerated.

## 2018-05-23 NOTE — PLAN OF CARE
Problem: Patient Care Overview  Goal: Plan of Care/Patient Progress Review  SLP: Attempted to see patient for a PMV trial but was not appropriate this afternoon. Patient given Seroquel per her nurse and unable to arouse. RT reports difficulty catching her breath on the vent. Will reschedule for 5/24/18 around 13:30.

## 2018-05-24 LAB
ANION GAP SERPL CALCULATED.3IONS-SCNC: 7 MMOL/L (ref 3–14)
BUN SERPL-MCNC: 66 MG/DL (ref 7–30)
CALCIUM SERPL-MCNC: 8.3 MG/DL (ref 8.5–10.1)
CHLORIDE SERPL-SCNC: 99 MMOL/L (ref 94–109)
CO2 SERPL-SCNC: 34 MMOL/L (ref 20–32)
CREAT SERPL-MCNC: 1.14 MG/DL (ref 0.52–1.04)
ERYTHROCYTE [DISTWIDTH] IN BLOOD BY AUTOMATED COUNT: 18.4 % (ref 10–15)
GFR SERPL CREATININE-BSD FRML MDRD: 45 ML/MIN/1.7M2
GLUCOSE BLDC GLUCOMTR-MCNC: 147 MG/DL (ref 70–99)
GLUCOSE BLDC GLUCOMTR-MCNC: 150 MG/DL (ref 70–99)
GLUCOSE BLDC GLUCOMTR-MCNC: 170 MG/DL (ref 70–99)
GLUCOSE BLDC GLUCOMTR-MCNC: 176 MG/DL (ref 70–99)
GLUCOSE BLDC GLUCOMTR-MCNC: 184 MG/DL (ref 70–99)
GLUCOSE BLDC GLUCOMTR-MCNC: 194 MG/DL (ref 70–99)
GLUCOSE SERPL-MCNC: 198 MG/DL (ref 70–99)
HCT VFR BLD AUTO: 28.7 % (ref 35–47)
HGB BLD-MCNC: 8.7 G/DL (ref 11.7–15.7)
INR PPP: 3.44 (ref 0.86–1.14)
MCH RBC QN AUTO: 27.4 PG (ref 26.5–33)
MCHC RBC AUTO-ENTMCNC: 30.3 G/DL (ref 31.5–36.5)
MCV RBC AUTO: 90 FL (ref 78–100)
PLATELET # BLD AUTO: 254 10E9/L (ref 150–450)
POTASSIUM SERPL-SCNC: 3.5 MMOL/L (ref 3.4–5.3)
RBC # BLD AUTO: 3.18 10E12/L (ref 3.8–5.2)
SODIUM SERPL-SCNC: 140 MMOL/L (ref 133–144)
WBC # BLD AUTO: 9 10E9/L (ref 4–11)

## 2018-05-24 PROCEDURE — 94003 VENT MGMT INPAT SUBQ DAY: CPT

## 2018-05-24 PROCEDURE — 27210432 ZZH NUTRITION PRODUCT RENAL BASIC LITER

## 2018-05-24 PROCEDURE — 85610 PROTHROMBIN TIME: CPT | Performed by: HOSPITALIST

## 2018-05-24 PROCEDURE — 00000146 ZZHCL STATISTIC GLUCOSE BY METER IP

## 2018-05-24 PROCEDURE — 40000275 ZZH STATISTIC RCP TIME EA 10 MIN

## 2018-05-24 PROCEDURE — 20000003 ZZH R&B ICU

## 2018-05-24 PROCEDURE — 40000008 ZZH STATISTIC AIRWAY CARE

## 2018-05-24 PROCEDURE — 25000128 H RX IP 250 OP 636: Performed by: INTERNAL MEDICINE

## 2018-05-24 PROCEDURE — 85027 COMPLETE CBC AUTOMATED: CPT | Performed by: HOSPITALIST

## 2018-05-24 PROCEDURE — A9270 NON-COVERED ITEM OR SERVICE: HCPCS | Mod: GY | Performed by: HOSPITALIST

## 2018-05-24 PROCEDURE — 40000239 ZZH STATISTIC VAT ROUNDS

## 2018-05-24 PROCEDURE — 80048 BASIC METABOLIC PNL TOTAL CA: CPT | Performed by: HOSPITALIST

## 2018-05-24 PROCEDURE — 99233 SBSQ HOSP IP/OBS HIGH 50: CPT | Performed by: INTERNAL MEDICINE

## 2018-05-24 PROCEDURE — 25000131 ZZH RX MED GY IP 250 OP 636 PS 637: Mod: GY | Performed by: INTERNAL MEDICINE

## 2018-05-24 PROCEDURE — 25000132 ZZH RX MED GY IP 250 OP 250 PS 637: Mod: GY | Performed by: HOSPITALIST

## 2018-05-24 RX ORDER — FUROSEMIDE 10 MG/ML
20 INJECTION INTRAMUSCULAR; INTRAVENOUS ONCE
Status: COMPLETED | OUTPATIENT
Start: 2018-05-24 | End: 2018-05-24

## 2018-05-24 RX ADMIN — CHLORHEXIDINE GLUCONATE 15 ML: 1.2 RINSE ORAL at 08:37

## 2018-05-24 RX ADMIN — INSULIN GLARGINE 16 UNITS: 100 INJECTION, SOLUTION SUBCUTANEOUS at 22:07

## 2018-05-24 RX ADMIN — INSULIN ASPART 1 UNITS: 100 INJECTION, SOLUTION INTRAVENOUS; SUBCUTANEOUS at 13:54

## 2018-05-24 RX ADMIN — TORSEMIDE 10 MG: 10 TABLET ORAL at 08:36

## 2018-05-24 RX ADMIN — Medication 1 CAPSULE: at 08:36

## 2018-05-24 RX ADMIN — FUROSEMIDE 20 MG: 10 INJECTION, SOLUTION INTRAMUSCULAR; INTRAVENOUS at 11:30

## 2018-05-24 RX ADMIN — Medication 6.25 MG: at 22:04

## 2018-05-24 RX ADMIN — TORSEMIDE 10 MG: 10 TABLET ORAL at 16:41

## 2018-05-24 RX ADMIN — INSULIN ASPART 1 UNITS: 100 INJECTION, SOLUTION INTRAVENOUS; SUBCUTANEOUS at 08:53

## 2018-05-24 RX ADMIN — FLUTICASONE PROPIONATE 1 SPRAY: 50 SPRAY, METERED NASAL at 08:37

## 2018-05-24 RX ADMIN — METOPROLOL TARTRATE 25 MG: 25 TABLET, FILM COATED ORAL at 08:36

## 2018-05-24 RX ADMIN — INSULIN ASPART 1 UNITS: 100 INJECTION, SOLUTION INTRAVENOUS; SUBCUTANEOUS at 16:51

## 2018-05-24 RX ADMIN — INSULIN ASPART 1 UNITS: 100 INJECTION, SOLUTION INTRAVENOUS; SUBCUTANEOUS at 01:29

## 2018-05-24 RX ADMIN — INSULIN ASPART 1 UNITS: 100 INJECTION, SOLUTION INTRAVENOUS; SUBCUTANEOUS at 20:05

## 2018-05-24 RX ADMIN — MELATONIN TAB 3 MG 6 MG: 3 TAB at 22:04

## 2018-05-24 RX ADMIN — CHLORHEXIDINE GLUCONATE 15 ML: 1.2 RINSE ORAL at 20:05

## 2018-05-24 RX ADMIN — Medication 1 CAPSULE: at 20:06

## 2018-05-24 RX ADMIN — METOPROLOL TARTRATE 25 MG: 25 TABLET, FILM COATED ORAL at 16:41

## 2018-05-24 RX ADMIN — MIRTAZAPINE 15 MG: 7.5 TABLET ORAL at 22:04

## 2018-05-24 RX ADMIN — INSULIN ASPART 2 UNITS: 100 INJECTION, SOLUTION INTRAVENOUS; SUBCUTANEOUS at 05:37

## 2018-05-24 NOTE — PROGRESS NOTES
[facility]    Daily Progress Note  [facility]       Date of Admission:  5/21/2018    Assessment & Plan   Jacques Solis is a 88 year old female with past history A-fib, CHF, severe AS, DM II, asthma, chronic respiratory failure with vent dependence who was discharged home from [facility] on 5/18/18 but did not have appropriate home services arranged, but son also reporting a recent decline in respiratory status.    Acute on chronic hypoxic respiratory failure with vent dependence:  Chronic failure felt due to pleural effusions and overall debility.  Pulmonologist at [facility] felt she would chronically be vent dependent and weaning efforts were stopped.  CT chest in ED shows small to moderated loculated bilateral pleural effusions with atelectasis.  Also a 4 cm gil opacity in left upper lobe, but no SIRS criteria present. But daughter says a month ago she was off ventilator.   Ventilator status stable.    I placed on PS 15 and has -300.    - No acute issues, so will work to arrange discharge to home. Social work and care coordination involved    Paroxysmal A-fib:   NSR now  - continue PTA metoprolol (note son has been dosing this at 25 mg q8h)  - INR supra therapeutic (though discharge summary states goal of 2.5-3); continue coumadin, PharmD to dose    Hx of PE  - coumadin as above    Severe AS  Chronic diastolic CHF  TTE 3/2018 with EF 55-60%, severe AS (area of 0.6-0.7) and mild regurgitation, moderate MR.  Is reportedly not a TAVR candidate.  - continue PTA metolazone and torsemide  Give one more dose if iv lasix     DM II:  Increase lanus 16 units daily with SSI but daughter requests oral DM medications only except for sliding scale.     Asthma:  Continue PTA nebs.    Recent acute blood loss anemia:  Reportedly due to supratherapeutic lovenox in setting of PAULY.  Hgb at recent discharge 8.4, now improved to 10 g/dL.    Possible encephalopathy  Discharge summary states  "probable encephalopathy due to critical illness superimposed on some dementia.  Son denies any history of dementia, patient following commands ok   - continue PTA Seroquel    Nonhealing right leg wound  Due to hematoma with necrotic eschar now s/p debridement  - WOC consult    Dysphagia  - continue PTA tube feeds    DVT Prophylaxis: Warfarin    # Pain Assessment:  Current Pain Score 5/24/2018   Patient currently in pain? denies   Pain score (0-10) -   Pain location -   Pain descriptors -   CPOT pain score 0   Jacques newell pain level was assessed and she currently denies pain.          Disposition: Expected discharge today if  home care resources can be arranged.    Juan Jose Taveras    Primary Care Physician   Michael Mccarthy    Chief Complaint   Lack of home resources for management    History is obtained from the patient's son (who is an anesthesiologist) and chart review. Recent discharge summary from [facility] said the following \"87-year-old female well-known to the critical care service with a history of some mild memory impairment, but has been essentially hospital-dependent for the last 9 months with severe aortic stenosis, not a candidate for TAVR, chronic diastolic heart failure, chronic bilateral recurrent pleural effusions, chronic vent dependence, history of pulmonary embolism, atrial fibrillation, peripheral artery disease with history of critical limb ischemia, who was transferred to [facility] from a long-term care facility. She was transferred due to acute blood loss anemia, elevated leukocytosis and acute renal failure. She was recently started on tobramycin for pseudomonas growing in her sputum.\"      History of Present Illness   Jacques Solis is a 88 year old female who presents with inadequate home support and possible acute on chronic hypoxic respiratory failure.  She has essentially been hospitalized since September 2017 when she was admitted to [facility] for critical limb ischemia and " underwent balloon angioplasty which was complicated by femoral artery perforation with hemorrhagic shock.  She also had chronic respiratory and was unable to wean from the vent and was trached and pegged on 11/13/17.  She was also noted to have some encephalopathy.  Ultimately she was discharged to [facility]. She has essentially moved back and forth from [facility] and [facility] and then to [facility] and [facility] ever since.  Her most recent admission to [facility] 4/26-5/4 was due to acute blood loss anemia due to left thigh hematoma in addition to renal failure due to tobramycin toxicity for multi-drug resistant Pseudomonas.  She also underwent debridement of a right lower extremity wound due to an infected hematoma.  She was ultimately discharged to [facility], where she has stayed until discharge home on 5/18/18.  Pulmonology felt that she would not be able to wean and recommended discharge home or to TCU and son elected for discharge home.      He reports he was not set up with anyone who would be able to manage a home vent, reports not being given detailed sliding scale insulin instructions (no dosage parameters), was unable to obtain nebs due to lack of appropriate ICD code on discharge orders, did not have supplies at home to replace clogged tube feeding piece in addition to concerns about the competency of the home nurse.  Due to these issues he presented to [facility], unclear as to why he did not return to [facility], which is very familiar with the patient's case.  He reports his mother has otherwise been stable with the exception that she experienced several acute desaturation events over the past 2 days which resolved with suctioning.  He reports about 1 month ago she was able to tolerate being off the ventilator for about 16 hours/day and was able to speak with passy wilner valve.    Son reports his goals are for his mother to be able to wean from the vent for at least a few hours and be able to speak with valve and possible  eat some food by mouth.  He understands that she may never be able to fully wean from the vent and is accepting of this, however, he wants any reversible conditions treated.    Past Medical History    Paroxysmal atrial fibrillation  Chronic respiratory failure secondary to recurrent pleural effusions and deconditioning  Severe aortic stenosis  Chronic diastolic congestive heart failure  Diabetes mellitus type 2  Asthma   Dysphagia with feeding tube dependence  History of critical limb ischemia    Past Surgical History   D&C  Tracheostomy  PEG tube placement    Prior to Admission Medications   Prior to Admission Medications   Prescriptions Last Dose Informant Patient Reported? Taking?   Acetaminophen (TYLENOL PO)  at PRN Care Giver Yes Yes   Sig: Take 650 mg by mouth every 8 hours as needed for mild pain   Emollient (HYDROPHOR) OINT  at PRN Care Giver Yes Yes   Sig: Externally apply topically daily as needed   MELATONIN PO 2018 at HS Care Giver Yes Yes   Sig: Take 6 mg by mouth At Bedtime   METOPROLOL TARTRATE PO 2018 at NOON Care Giver Yes Yes   Si mg by Oral or G tube route every 8 hours    MetOLazone (ZAROXOLYN PO)  at PRN Care Giver Yes Yes   Sig: Take 2.5 mg by mouth daily as needed for other (BODY WEIGHT OVER 192 LBS)   Mirtazapine (REMERON PO) 2018 at HS Care Giver Yes Yes   Sig: 15 mg by Oral or G tube route At Bedtime   QUEtiapine Fumarate (SEROQUEL PO)  at PRN Care Giver Yes Yes   Sig: Take 6.25 mg by mouth every 8 hours as needed (AGITATION.)   SIMETHICONE-80 PO  at PRN Care Giver Yes Yes   Sig: Take 80 mg by mouth 4 times daily as needed for intestinal gas or other (FLATULENCE)   Torsemide (DEMADEX PO) 2018 at PM Care Giver Yes Yes   Sig: 10 mg by Oral or G tube route 2 times daily (before meals)   WARFARIN SODIUM PO 2018 at PM Care Giver Yes Yes   Sig: Take 4 mg by mouth daily   bacitracin 500 UNIT/GM OINT 2018 at AM Care Giver Yes Yes   Sig: Apply topically 2 times  daily   bisacodyl (DULCOLAX) 10 MG Suppository  at PRN Care Giver Yes Yes   Sig: Place 10 mg rectally daily as needed for constipation   chlorhexidine (PERIDEX) 0.12 % solution 2018 at AM Care Giver Yes Yes   Sig: Take 15 mLs by mouth 2 times daily   fluticasone (FLONASE) 50 MCG/ACT spray 2018 at AM Care Giver Yes Yes   Sig: Spray 1 spray into both nostrils daily   insulin glargine (LANTUS SOLOSTAR) 100 UNIT/ML pen 2018 at HS Care Giver Yes Yes   Sig: Inject 12 Units Subcutaneous At Bedtime   insulin lispro (HUMALOG KWIKPEN) 100 UNIT/ML injection 2018 at NOON Care Giver Yes Yes   Sig: Inject 0.12 Units Subcutaneous every 6 hours NO SLIDING SCALE WAS GIVEN. PREVIOUS SS, WHILE AT [facility], WENT UP TO 16 UNITS.   ipratropium - albuterol 0.5 mg/2.5 mg/3 mL (DUONEB) 0.5-2.5 (3) MG/3ML neb solution  at PRN Care Giver Yes Yes   Sig: Take 1 vial by nebulization 2 times daily as needed for wheezing   lactobacillus TABS 2018 at AM Care Giver Yes Yes   Si tablets by Oral or G tube route 2 times daily   sennosides (SENOKOT) 8.8 MG/5ML syrup  at PRN Care Giver Yes Yes   Sig: Take 5 mLs by mouth daily as needed for constipation      Facility-Administered Medications: None     Allergies   Allergies   Allergen Reactions     Amikacin      Amiodarone      Codeine      Dexmedetomidine      Lisinopril      Penicillins      Sulfa Drugs            Physical Exam   Temp: 98.4  F (36.9  C) Temp src: Bladder BP: 128/68 Pulse: 138 Heart Rate: 102 Resp: 16 SpO2: 100 % O2 Device: Mechanical Ventilator    Vital Signs with Ranges  Temp:  [97.9  F (36.6  C)-98.4  F (36.9  C)] 98.4  F (36.9  C)  Pulse:  [138] 138  Heart Rate:  [] 102  Resp:  [12-39] 16  BP: (103-153)/() 128/68  FiO2 (%):  [40 %] 40 %  SpO2:  [99 %-100 %] 100 %  184 lbs 11.93 oz    Constitutional:  well nourished female, easily awakened and mouths words.   Eyes: pupils equal, round and reactive to light,  HEENT: , trach in place, [place]    Respiratory: lungs clear to auscultation bilaterally,, no tachypnea  Cardiovascular: RR rhythm, normal S1/S2, no murmur, rubs or gallops  GI: abdomen soft, non-tender, non-distended, normal bowel sounds, no hepatosplenomegaly, PEG tube in place  Lymph/Hematologic: 2+ thigh edema  Skin: dressing over skin wound right thigh.   Musculoskeletal: Extremities warm and well perfused      I  Data   Data reviewed today:  I personally reviewed no images or EKG's today.    Recent Labs  Lab 05/24/18  0530 05/23/18  0443 05/22/18  0410   WBC 9.0 9.6 8.2   HGB 8.7* 9.5* 9.5*   MCV 90 89 89    284 295   INR 3.44* 3.11* 2.31*    139 138   POTASSIUM 3.5 3.6 3.7   CHLORIDE 99 96 98   CO2 34* 34* 35*   BUN 66* 64* 69*   CR 1.14* 1.20* 1.16*   ANIONGAP 7 9 5   KRISTOFER 8.3* 8.6 8.6   * 170* 172*   ALBUMIN  --  2.3*  --    PROTTOTAL  --  6.4*  --    BILITOTAL  --  0.5  --    ALKPHOS  --  224*  --    ALT  --  47  --    AST  --  49*  --        No results found for this or any previous visit (from the past 24 hour(s)).

## 2018-05-24 NOTE — PROGRESS NOTES
[facility] ICU RESPIRATORY NOTE  Date of Admission: 5/21/2018  Date of Intubation (most recent): 5/21/2018  Reason for Mechanical Ventilation: Acute on chronic hypoxic respiratory failure with chronic vent dependent  Number of Days on Mechanical Ventilation: 3  Met Criteria for Pressure Support Trial: No  Length of Pressure Support Trial: None  Reason for No Pressure Support Trial: Per MD    Significant Events Today:  Ventilation Mode: CMV/AC  (Continuous Mandatory Ventilation/ Assist Control)  FiO2 (%): 40 %  Rate Set (breaths/minute): 10 breaths/min  Tidal Volume Set (mL): 360 mL  PEEP (cm H2O): 5 cmH2O  Oxygen Concentration (%): 40 %  Resp: 22     ABG Results: No ABG result for today    ETT appearance on chest x-ray: Trach site clean, patent and secure    Plan:  Continue full mechanical ventilatory support overnight

## 2018-05-24 NOTE — PROVIDER NOTIFICATION
Notified Person: MD Nuno     Notification Date/Time: 5/24/2018 0445    Notification Interaction: in  person     Purpose of Notification: borderline UOP    Orders Received: NO    Comments:  Informed MD of borderline UOP ( 30-35 cc Q hrs) and will continue to monitor now and notify provider with any change.

## 2018-05-24 NOTE — PROGRESS NOTES
[facility] ICU RESPIRATORY NOTE  Date of Admission: 5/21/2018  Date of Intubation (most recent): 5/21/2018  Reason for Mechanical Ventilation: Acute on chronic hypoxic respiratory failure with chronic vent dependent  Number of Days on Mechanical Ventilation: 4  Met Criteria for Pressure Support Trial: No  Length of Pressure Support Trial: None  Reason for No Pressure Support Trial: Per MD      Recent Labs  Lab 05/22/18  0127   PH 7.48*   PCO2 45   PO2 145*   HCO3 34*   O2PER 40     Ventilation Mode: CMV/AC  (Continuous Mandatory Ventilation/ Assist Control)  FiO2 (%): 40 %  Rate Set (breaths/minute): 10 breaths/min  Tidal Volume Set (mL): 360 mL  PEEP (cm H2O): 5 cmH2O  Oxygen Concentration (%): 40 %  Resp: 19    Will continue to follow.     Jose Arellano RT

## 2018-05-24 NOTE — PROGRESS NOTES
"Care Coordination:    Receive call from Jen, daughter for home care choices. Gave Jen some choices. Writer had called [facility] for quote which was given to daughter. Called [facility], talked to Kaya, faxed info for referral for services (facesheet, EMAR, H&P). They will call daughter and send someone out today for met patient and daughter.    Daughter handed writer a list, written by Yariel, patient's son. List consists of:  1: Swallow Test  2: Home OT/PT.  3. Sliding scale  4. Wound Care:Ascitic acid, what to do, instructions  5. Nebs, ICD code so Medicare covers it.  6. Home INR machine  7. Glucometer---> covered by Medicare  8. Oximeter/Oximetry---> covered by Medicare=not covered by Medicare, $3080 per unit, per [facility].  9. Wheelchair---->covered by Medicare  10. Home lab draws---->covered by Medicare  11: Need MD follow up for Vent and Medical questions  12. Extra G tube supplies  13. Home Care/ for home to show how to work things, change G Tube, proper Trach Care instructions,         Proper wound care instruction,  14. RT to show family to use speaking valve attachment  15. Urine device at night to \"suck\" urine at night.    Transportation company # [phone]    Judie Lloyd RN BSN  [facility] Care Coordinator  Phone [phone]    "

## 2018-05-24 NOTE — PROGRESS NOTES
[facility] ICU RESPIRATORY NOTE  Date of Admission: 5/21/2018  Date of Intubation (most recent): 5/21/2018  Reason for Mechanical Ventilation: Acute on chronic hypoxic respiratory failure with chronic vent dependent  Number of Days on Mechanical Ventilation: 4  Met Criteria for Pressure Support Trial: yes  Length of Pressure Support Trial: 5 hours  Reason for Stopping Pressure Support Trial:Let pt rest  Reason for No Pressure Support Trial:    Ventilation Mode: CMV/AC  (Continuous Mandatory Ventilation/ Assist Control)  FiO2 (%): 40 %  Rate Set (breaths/minute): 10 breaths/min  Tidal Volume Set (mL): 360 mL  PEEP (cm H2O): 5 cmH2O  Pressure Support (cm H2O): 15 cmH2O  Oxygen Concentration (%): 40 %  Resp: 15    Significant Events Today:    ETT appearance on chest x-ray: Trach in good position    Plan:  Continue full ventilatory support overnight. Will continue to follow and assess for daily SBT.    Taylor RIZVI

## 2018-05-24 NOTE — PLAN OF CARE
Problem: Patient Care Overview  Goal: Plan of Care/Patient Progress Review  Outcome: No Change  Pt pressure supported x1 today at 15/5, on vent for most of day d't low volume.  L hand and Rt calf dressing changed per plan of care.  Up to the chair this afternoon.  Morris patent w/ good UOP, catheter care done.  Neuros unchanged from previous, lethargic/alert, sleepy between cares at times, pupils reactive.  Tele: Afib CVR.  No coumadin dose today, INR 3.44.  Tube feed at goal.  No signs of pain. Pt does shake head 'no' at times when asked if in pain.  Plan: cont pressure support trials tomorrow, trach dome when pt can pressure support 5/5, care coordinator working to arrange home care.  Pt and daughter Jen updated at bedside throughout day.

## 2018-05-24 NOTE — PROGRESS NOTES
Care Coordination:    Received call from Amorelie,talk to Kaya, planned visit to come see patient in the hospital. [employer] met with patient, they would have to hire people for case, which would be at least a week to a week and a half. It would be $85.00 a hour. Called [facility], talked to Sintia, faxed over info for referral.  [facility] called writer back, they want info faxed. Ro from [facility] explained that patient would go to one of their group homes prior to going home, then transition back home once they have staff. That would take about a month. Spoke to Jen, it might be an option for them. They would have to tour it prior to it patient going.      Judie Lloyd RN BSN  [facility] Care Coordinator  Phone [phone]

## 2018-05-24 NOTE — PLAN OF CARE
Problem: Patient Care Overview  Goal: Plan of Care/Patient Progress Review  SLP: Consulted with RT about Passy Anuel Valve trial (PMV). She stated MD, wants to hold off until patient is able to tolerate PS of 5/5 before starting the trials. Will continue with PMV trials once able to tolerate PS. Will need to be able to tolerate the PMV for 30 plus minutes before proceeding with a swallow evaluation. Will need ST at discharge through . Will reschedule for 5/25/18.

## 2018-05-25 ENCOUNTER — APPOINTMENT (OUTPATIENT)
Dept: SPEECH THERAPY | Facility: CLINIC | Age: 83
DRG: 207 | End: 2018-05-25
Payer: MEDICARE

## 2018-05-25 ENCOUNTER — APPOINTMENT (OUTPATIENT)
Dept: GENERAL RADIOLOGY | Facility: CLINIC | Age: 83
DRG: 207 | End: 2018-05-25
Attending: SURGERY
Payer: MEDICARE

## 2018-05-25 LAB
ANION GAP SERPL CALCULATED.3IONS-SCNC: 7 MMOL/L (ref 3–14)
BUN SERPL-MCNC: 65 MG/DL (ref 7–30)
CALCIUM SERPL-MCNC: 8.8 MG/DL (ref 8.5–10.1)
CHLORIDE SERPL-SCNC: 97 MMOL/L (ref 94–109)
CO2 SERPL-SCNC: 36 MMOL/L (ref 20–32)
CREAT SERPL-MCNC: 1.15 MG/DL (ref 0.52–1.04)
ERYTHROCYTE [DISTWIDTH] IN BLOOD BY AUTOMATED COUNT: 18.4 % (ref 10–15)
GFR SERPL CREATININE-BSD FRML MDRD: 45 ML/MIN/1.7M2
GLUCOSE BLDC GLUCOMTR-MCNC: 144 MG/DL (ref 70–99)
GLUCOSE BLDC GLUCOMTR-MCNC: 149 MG/DL (ref 70–99)
GLUCOSE BLDC GLUCOMTR-MCNC: 154 MG/DL (ref 70–99)
GLUCOSE BLDC GLUCOMTR-MCNC: 166 MG/DL (ref 70–99)
GLUCOSE BLDC GLUCOMTR-MCNC: 167 MG/DL (ref 70–99)
GLUCOSE BLDC GLUCOMTR-MCNC: 173 MG/DL (ref 70–99)
GLUCOSE BLDC GLUCOMTR-MCNC: 178 MG/DL (ref 70–99)
GLUCOSE SERPL-MCNC: 148 MG/DL (ref 70–99)
HCT VFR BLD AUTO: 36.3 % (ref 35–47)
HGB BLD-MCNC: 10.8 G/DL (ref 11.7–15.7)
INR PPP: 2.49 (ref 0.86–1.14)
LACTATE BLD-SCNC: 1 MMOL/L (ref 0.7–2)
MCH RBC QN AUTO: 26.9 PG (ref 26.5–33)
MCHC RBC AUTO-ENTMCNC: 29.8 G/DL (ref 31.5–36.5)
MCV RBC AUTO: 90 FL (ref 78–100)
PLATELET # BLD AUTO: 347 10E9/L (ref 150–450)
POTASSIUM SERPL-SCNC: 3.4 MMOL/L (ref 3.4–5.3)
RBC # BLD AUTO: 4.02 10E12/L (ref 3.8–5.2)
SODIUM SERPL-SCNC: 140 MMOL/L (ref 133–144)
WBC # BLD AUTO: 10.3 10E9/L (ref 4–11)

## 2018-05-25 PROCEDURE — 80048 BASIC METABOLIC PNL TOTAL CA: CPT | Performed by: HOSPITALIST

## 2018-05-25 PROCEDURE — 40000239 ZZH STATISTIC VAT ROUNDS

## 2018-05-25 PROCEDURE — 99291 CRITICAL CARE FIRST HOUR: CPT | Performed by: INTERNAL MEDICINE

## 2018-05-25 PROCEDURE — 85027 COMPLETE CBC AUTOMATED: CPT | Performed by: HOSPITALIST

## 2018-05-25 PROCEDURE — 40000008 ZZH STATISTIC AIRWAY CARE

## 2018-05-25 PROCEDURE — 74018 RADEX ABDOMEN 1 VIEW: CPT

## 2018-05-25 PROCEDURE — 94003 VENT MGMT INPAT SUBQ DAY: CPT

## 2018-05-25 PROCEDURE — 00000146 ZZHCL STATISTIC GLUCOSE BY METER IP

## 2018-05-25 PROCEDURE — 93005 ELECTROCARDIOGRAM TRACING: CPT

## 2018-05-25 PROCEDURE — A9270 NON-COVERED ITEM OR SERVICE: HCPCS | Mod: GY | Performed by: HOSPITALIST

## 2018-05-25 PROCEDURE — 40000275 ZZH STATISTIC RCP TIME EA 10 MIN

## 2018-05-25 PROCEDURE — 93010 ELECTROCARDIOGRAM REPORT: CPT | Performed by: INTERNAL MEDICINE

## 2018-05-25 PROCEDURE — A9270 NON-COVERED ITEM OR SERVICE: HCPCS | Mod: GY | Performed by: INTERNAL MEDICINE

## 2018-05-25 PROCEDURE — 25000132 ZZH RX MED GY IP 250 OP 250 PS 637: Mod: GY | Performed by: INTERNAL MEDICINE

## 2018-05-25 PROCEDURE — 27210432 ZZH NUTRITION PRODUCT RENAL BASIC LITER

## 2018-05-25 PROCEDURE — 25000125 ZZHC RX 250

## 2018-05-25 PROCEDURE — 40000225 ZZH STATISTIC SLP WARD VISIT: Performed by: SPEECH-LANGUAGE PATHOLOGIST

## 2018-05-25 PROCEDURE — 25000132 ZZH RX MED GY IP 250 OP 250 PS 637: Mod: GY | Performed by: HOSPITALIST

## 2018-05-25 PROCEDURE — 25000131 ZZH RX MED GY IP 250 OP 636 PS 637: Mod: GY | Performed by: INTERNAL MEDICINE

## 2018-05-25 PROCEDURE — 20000003 ZZH R&B ICU

## 2018-05-25 PROCEDURE — 85610 PROTHROMBIN TIME: CPT | Performed by: HOSPITALIST

## 2018-05-25 PROCEDURE — 92507 TX SP LANG VOICE COMM INDIV: CPT | Mod: GN | Performed by: SPEECH-LANGUAGE PATHOLOGIST

## 2018-05-25 PROCEDURE — 83605 ASSAY OF LACTIC ACID: CPT | Performed by: INTERNAL MEDICINE

## 2018-05-25 RX ORDER — WARFARIN SODIUM 3 MG/1
3 TABLET ORAL
Status: COMPLETED | OUTPATIENT
Start: 2018-05-25 | End: 2018-05-25

## 2018-05-25 RX ORDER — SIMETHICONE 40MG/0.6ML
40 SUSPENSION, DROPS(FINAL DOSAGE FORM)(ML) ORAL EVERY 6 HOURS PRN
Status: DISCONTINUED | OUTPATIENT
Start: 2018-05-25 | End: 2018-06-14 | Stop reason: HOSPADM

## 2018-05-25 RX ORDER — METOPROLOL TARTRATE 1 MG/ML
INJECTION, SOLUTION INTRAVENOUS
Status: COMPLETED
Start: 2018-05-25 | End: 2018-05-25

## 2018-05-25 RX ORDER — WARFARIN SODIUM 2 MG/1
2 TABLET ORAL
Status: DISCONTINUED | OUTPATIENT
Start: 2018-05-25 | End: 2018-05-25

## 2018-05-25 RX ORDER — METOPROLOL TARTRATE 1 MG/ML
5 INJECTION, SOLUTION INTRAVENOUS ONCE
Status: COMPLETED | OUTPATIENT
Start: 2018-05-25 | End: 2018-05-25

## 2018-05-25 RX ORDER — BLOOD-GLUCOSE CONTROL, NORMAL
EACH MISCELLANEOUS
Qty: 1 EACH | Refills: 0 | Status: SHIPPED | OUTPATIENT
Start: 2018-05-25

## 2018-05-25 RX ADMIN — METOPROLOL TARTRATE 25 MG: 25 TABLET, FILM COATED ORAL at 00:15

## 2018-05-25 RX ADMIN — INSULIN ASPART 1 UNITS: 100 INJECTION, SOLUTION INTRAVENOUS; SUBCUTANEOUS at 04:09

## 2018-05-25 RX ADMIN — METOPROLOL TARTRATE 25 MG: 25 TABLET, FILM COATED ORAL at 16:33

## 2018-05-25 RX ADMIN — INSULIN ASPART 1 UNITS: 100 INJECTION, SOLUTION INTRAVENOUS; SUBCUTANEOUS at 23:32

## 2018-05-25 RX ADMIN — CHLORHEXIDINE GLUCONATE 15 ML: 1.2 RINSE ORAL at 08:38

## 2018-05-25 RX ADMIN — METOROPROLOL TARTRATE 5 MG: 5 INJECTION, SOLUTION INTRAVENOUS at 04:55

## 2018-05-25 RX ADMIN — Medication 1 CAPSULE: at 08:27

## 2018-05-25 RX ADMIN — TORSEMIDE 10 MG: 10 TABLET ORAL at 16:33

## 2018-05-25 RX ADMIN — INSULIN ASPART 1 UNITS: 100 INJECTION, SOLUTION INTRAVENOUS; SUBCUTANEOUS at 12:19

## 2018-05-25 RX ADMIN — MELATONIN TAB 3 MG 6 MG: 3 TAB at 23:07

## 2018-05-25 RX ADMIN — INSULIN ASPART 1 UNITS: 100 INJECTION, SOLUTION INTRAVENOUS; SUBCUTANEOUS at 08:34

## 2018-05-25 RX ADMIN — METOPROLOL TARTRATE 25 MG: 25 TABLET, FILM COATED ORAL at 23:07

## 2018-05-25 RX ADMIN — SIMETHICONE 40 MG: 20 EMULSION ORAL at 20:42

## 2018-05-25 RX ADMIN — METOPROLOL TARTRATE 5 MG: 1 INJECTION, SOLUTION INTRAVENOUS at 04:55

## 2018-05-25 RX ADMIN — INSULIN GLARGINE 16 UNITS: 100 INJECTION, SOLUTION SUBCUTANEOUS at 23:29

## 2018-05-25 RX ADMIN — INSULIN ASPART 1 UNITS: 100 INJECTION, SOLUTION INTRAVENOUS; SUBCUTANEOUS at 20:44

## 2018-05-25 RX ADMIN — METOPROLOL TARTRATE 25 MG: 25 TABLET, FILM COATED ORAL at 08:27

## 2018-05-25 RX ADMIN — FLUTICASONE PROPIONATE 1 SPRAY: 50 SPRAY, METERED NASAL at 08:41

## 2018-05-25 RX ADMIN — INSULIN ASPART 1 UNITS: 100 INJECTION, SOLUTION INTRAVENOUS; SUBCUTANEOUS at 00:18

## 2018-05-25 RX ADMIN — WARFARIN SODIUM 3 MG: 3 TABLET ORAL at 18:08

## 2018-05-25 RX ADMIN — Medication 1 CAPSULE: at 20:42

## 2018-05-25 RX ADMIN — TORSEMIDE 10 MG: 10 TABLET ORAL at 08:27

## 2018-05-25 RX ADMIN — MIRTAZAPINE 15 MG: 7.5 TABLET ORAL at 23:07

## 2018-05-25 RX ADMIN — CHLORHEXIDINE GLUCONATE 15 ML: 1.2 RINSE ORAL at 20:50

## 2018-05-25 RX ADMIN — INSULIN ASPART 1 UNITS: 100 INJECTION, SOLUTION INTRAVENOUS; SUBCUTANEOUS at 16:33

## 2018-05-25 NOTE — PLAN OF CARE
Problem: Patient Care Overview  Goal: Plan of Care/Patient Progress Review  Discharge Planner SLP   Patient plan for discharge: Not stated.   Current status: Patient seen for PMSV trials in conjunction with RT.  Patient using speaking valve in CMV mode in line with the ventilator.  After additional suctioning, patient tolerated cuff deflation and PMV placement for ~10 minutes with stable saturation levels and RR.  Good voicing was achieved at the phrase and sentence level. Plan to continue PMV use in communication Tx with SLP and RT supervision only at this time, next appt on Tuesday 5/29.   Barriers to return to prior living situation: Respiratory status, confusion.   Recommendations for discharge: Continue SLP services at discharge.   Rationale for recommendations: Recommend continued SLP services at discharge for speaking valve communication and for swallowing assessment and treatment.        Entered by: Lisa Andrews 05/25/2018 2:04 PM

## 2018-05-25 NOTE — PROGRESS NOTES
Care Coordination:    Background:   Yun reported patient was discharge with 24 hour Home Care from [facility].  Son considered [facility] TCU, but chose home.  Supplies for continued care not arranged prior to discharge.  Homecare agency did not meet expectations.      Plan:  Family wants new Home Care Agency.  Currently, the following were contacted (related info will be updated below the agency name).     1. [facility] (Fareed) [phone]  2. [facility] (Ro), [phone]  + pt would need to go to one of their group homes prior to d/c home, to fill the gap between hospitalization and when they would have staff to take the homecare case  + daughter would want to tour   3. [facility] (Olivia) [phone].   + has wait list of 20 people that need [facility] care with 12-24 hour care.  4. [facility] (Kaya) met with family in hospital  + info faxed  + Need to hire people for this case, takes 1-1.5 wks  + Cost $85/hr  5. [facility] (Sintia), referral faxed  6. [facility]  + quote obtained  7. [facility]   + they are booked 6-10 weeks out  8. [facility] (Ami) [phone]   + meeting with daughter Fri 5/25  9. [facility] [phone]   + talking to daughter Wed 5/30  10. [facility] [phone]   + daughter left voicemail  11. [facility] (Juan Antonio) [phone]   + Juan Antonio has texted his nurses and will connect with daughter 5/25     Ventilator supplies, per daughter, are through  + [employer]     Transportation    + company # [phone]  Supplies/Orders needed:   1. Home OT/PT, RN, RT for lab draws and teaching cares (PEG, trach / speaking valve, wound) - to be ordered at d/c   2. Sliding scale - make sure this is written out on d/c papers   3. Wound Care - make sure instructions are written on d/c papers   4. Nebs - DME order needed   5. Home INR machine - DME order possibly needed vs.  homecare to manage   6. Glucometer - Dr. Perez has ordered     Decided not needed.   8. Wheelchair - needs order, dtr would like to use [facility]    9. G-tube - nutrition order and supplies    10. Urine wicking type device - would like for at home if possible     Other questions:   1. Can a swallow test be done in the hospital? - yes    Aliya Grijalva RN, BSN  [facility] Care Coordinator   Mobile Phone: [phone]

## 2018-05-25 NOTE — PROGRESS NOTES
CLINICAL NUTRITION SERVICES - REASSESSMENT NOTE      Malnutrition: (5/22)  % Weight Loss:  None noted  % Intake:  No decreased intake noted  Subcutaneous Fat Loss:  None observed  Muscle Loss:  None observed  Fluid Retention:  None noted     Malnutrition Diagnosis: Patient does not meet two of the above criteria necessary for diagnosing malnutrition        EVALUATION OF PROGRESS TOWARD GOALS   Diet:  NPO on vent   Nutrition Support:  Patient continues home TF regimen as follows ~    Nutrition Support Enteral:  Type of Feeding Tube: G-tube   Enteral Frequency:  Continuous  Enteral Regimen: Nepro at 35 mL/hr  Total Enteral Provisions: 1512 kcal (24 kcal/kg), 68 g protein (1.1 g/kg), 135 g CHO, 11 g fiber, 613 mL H2O  Free Water Flush: 70 mL every 4 hours       Intake/Tolerance:    BUN 65 (H), Cr 1.15 (H) - CKD   BM x 1 on 5/23, smear today, receiving Culturell for bowel health   , 147, 149, 154, 148, 178 - Lantus 16 units at HS (increased on 5/24) + Med SSI   Weight 83.8 kg (stable)      ASSESSED NUTRITION NEEDS PER APPROVED PRACTICE GUIDELINES:     Dosing Weight 61.8 kg   Estimated Energy Needs: 4195-4804 kcals (20-25 Kcal/Kg)  Justification: obese and vented  Estimated Protein Needs: 50-75 grams protein (0.8-1.2 g pro/Kg)  Justification: CKD      NEW FINDINGS:   5/22:  WOCN eval = Surgical debridement wound to left calf has yellow exudate with shiny base and appears to be colonized.  Skin tears to right hand.     Previous Goals (5/22):   Nepro at 35 mL/hr will continue to meet % needs  Evaluation: Met    Previous Nutrition Diagnosis (5/22):   Inadequate EN infusion R/t slow TF resumption AEB meeting ~40% needs via current TF regimen  Evaluation: Resolved       CURRENT NUTRITION DIAGNOSIS  No nutrition diagnosis identified at this time    INTERVENTIONS  Recommendations / Nutrition Prescription  Continue Nepro at 35 mL/hr as above     Implementation  Collaboration and Referral of Nutrition care:  Patient  discussed today during interdisciplinary bedside rounds     Goals  Nepro at 35 mL/hr will continue to meet % needs     MONITORING AND EVALUATION:  Progress towards goals will be monitored and evaluated per protocol and Practice Guidelines    Alma Arcos RD, LD, CNSC   Clinical Dietitian - [facility]

## 2018-05-25 NOTE — PROGRESS NOTES
[facility] ICU RESPIRATORY NOTE  Date of Admission: 5/21/2018  Date of Intubation (most recent): 5/21/18  Reason for Mechanical Ventilation: Acute on chronic hypoxic respiratory failure with chronic vent dependency  Number of Days on Mechanical Ventilation: 5  Met Criteria for Pressure Support Trial: yes  Length of Pressure Support Trial: PS 15/5 x 1 hour then PS 13/5 from 0054-4446.  Back to CMV to do PMV.    Ventilation Mode: CMV/AC  (Continuous Mandatory Ventilation/ Assist Control)  FiO2 (%): 40 %  Rate Set (breaths/minute): 10 breaths/min  Tidal Volume Set (mL): 360 mL  PEEP (cm H2O): 5 cmH2O  Pressure Support (cm H2O): 13 cmH2O  Oxygen Concentration (%): 40 %  Resp: 16        Plan:    Continue ventilatory support.  PS trials daily.  PMV with speech.

## 2018-05-25 NOTE — PROGRESS NOTES
Patient tolerated PMV for 10 minutes. Speech and RT by bedside.  Pt placed back on full vent settings. Will continue to follow.    Taylor RIZVI

## 2018-05-25 NOTE — PROGRESS NOTES
Tele-ICU cross-cover  DOS: 5/25/2018     Called for rapid atrial fibrillation.    Pt has a history of paroxysmal atrial fibrillation, she converted back into AF from sinus rhythm this evening with rates 130s-140s (as high as 170s at my exam). SBP 120s-130s. She is alert and appears a bit anxious. Per RN, she has been belching frequently since AF started. Pulse is irregular. Abdomen soft and slightly distended but not apparently tender.    - EKG shows atrial fibrillation with RVR.  - Metoprolol 5 mg IV x1 slowed rate to ~100.  - Checking electrolytes.  - Check AXR; if belching continues will stop TF to check residual.    Adán Nuno MD, PhD  Surgical critical care  May 25, 2018, 5:08 AM

## 2018-05-25 NOTE — PLAN OF CARE
Problem: Patient Care Overview  Goal: Plan of Care/Patient Progress Review  Outcome: No Change  Pt follows commands but seems slightly confused. Tries to talk but hard to understand. Afib all night, went into Afib RVR this morning given metoprolol and HR went back to 70s-80s. Moderate amounts of secretions from trach. Tube feeding at goal, small smear BM this morning.

## 2018-05-25 NOTE — PROGRESS NOTES
[facility] ICU RESPIRATORY NOTE  Date of Admission: 5/21/2018  Date of Intubation (most recent): 5/21/2018  Reason for Mechanical Ventilation: Acute on chronic hypoxic respiratory failure with chronic vent dependent  Number of Days on Mechanical Ventilation: 5  Met Criteria for Pressure Support Trial: yes  Length of Pressure Support Trial:   Reason for Stopping Pressure Support Trial:  Reason for No Pressure Support Trial: Rest overnight      Recent Labs  Lab 05/22/18  0127   PH 7.48*   PCO2 45   PO2 145*   HCO3 34*   O2PER 40     Ventilation Mode: CMV/AC  (Continuous Mandatory Ventilation/ Assist Control)  FiO2 (%): 40 %  Rate Set (breaths/minute): 10 breaths/min  Tidal Volume Set (mL): 360 mL  PEEP (cm H2O): 5 cmH2O  Pressure Support (cm H2O): 15 cmH2O  Oxygen Concentration (%): 40 %  Resp: 22    Will continue to follow.     Jose Arellano RT

## 2018-05-25 NOTE — PROGRESS NOTES
[facility]    Daily Progress Note  [facility]       Date of Admission:  5/21/2018    Assessment & Plan   Jacques Solis is a 88 year old female with past history A-fib, CHF, severe AS, DM II, asthma, chronic respiratory failure with vent dependence who was discharged home from [facility] on 5/18/18 but did not have appropriate home services arranged, but son also reporting a recent decline in respiratory status.    Acute on chronic hypoxic respiratory failure with vent dependence:  Chronic failure felt due to pleural effusions and overall debility.  Pulmonologist at [facility] felt she would chronically be vent dependent and weaning efforts were stopped.  CT chest in ED shows small to moderated loculated bilateral pleural effusions with atelectasis.  Also a 4 cm gil opacity in left upper lobe, but no SIRS criteria present. But daughter says a month ago she was off ventilator.   Ventilator status stable.    I placed on PS 15 and has -300.    - No acute issues, so will work to arrange discharge to home. Social work and care coordination involved    Paroxysmal A-fib:   NSR now  - continue PTA metoprolol (note son has been dosing this at 25 mg q8h)  - INR supra therapeutic (though discharge summary states goal of 2.5-3); continue coumadin, PharmD to dose    Hx of PE  - coumadin as above    Severe AS  Chronic diastolic CHF  TTE 3/2018 with EF 55-60%, severe AS (area of 0.6-0.7) and mild regurgitation, moderate MR.  Is reportedly not a TAVR candidate.  - continue PTA metolazone and torsemide  Give one more dose if iv lasix     DM II:  Increase lanus 16 units daily with SSI but daughter requests oral DM medications only except for sliding scale.     Asthma:  Continue PTA nebs.    Recent acute blood loss anemia:  Reportedly due to supratherapeutic lovenox in setting of PAULY.  Hgb at recent discharge 8.4, now improved to 10 g/dL.    Possible encephalopathy  Discharge summary states  "probable encephalopathy due to critical illness superimposed on some dementia.  Son denies any history of dementia, patient following commands ok   - continue PTA Seroquel    Nonhealing right leg wound  Due to hematoma with necrotic eschar now s/p debridement  - WOC consult    Dysphagia  - continue PTA tube feeds    Mild abdominal distension  -possible adynamic ileus, but tolerating tube feeds  -simethicone prn    DVT Prophylaxis: Warfarin    # Pain Assessment:  Current Pain Score 5/25/2018   Patient currently in pain? denies   Pain score (0-10) -   Pain location -   Pain descriptors -   CPOT pain score -   Jacques newell pain level was assessed and she currently denies pain.          Disposition: Expected discharge as soon as home care assistance is arranged.  Critical care time: 40 minutes  Fortino Perez    Primary Care Physician   Michael Mccarthy    Chief Complaint   Lack of home resources for management    History is obtained from the patient's son (who is an anesthesiologist) and chart review. Recent discharge summary from [facility] said the following \"87-year-old female well-known to the critical care service with a history of some mild memory impairment, but has been essentially hospital-dependent for the last 9 months with severe aortic stenosis, not a candidate for TAVR, chronic diastolic heart failure, chronic bilateral recurrent pleural effusions, chronic vent dependence, history of pulmonary embolism, atrial fibrillation, peripheral artery disease with history of critical limb ischemia, who was transferred to [facility] from a long-term care facility. She was transferred due to acute blood loss anemia, elevated leukocytosis and acute renal failure. She was recently started on tobramycin for pseudomonas growing in her sputum.\"      History of Present Illness   Jacques Solis is a 88 year old female who presents with inadequate home support and possible acute on chronic hypoxic respiratory failure. "  She has essentially been hospitalized since September 2017 when she was admitted to [facility] for critical limb ischemia and underwent balloon angioplasty which was complicated by femoral artery perforation with hemorrhagic shock.  She also had chronic respiratory and was unable to wean from the vent and was trached and pegged on 11/13/17.  She was also noted to have some encephalopathy.  Ultimately she was discharged to [facility]. She has essentially moved back and forth from [facility] and [facility] and then to [facility] and [facility] ever since.  Her most recent admission to [facility] 4/26-5/4 was due to acute blood loss anemia due to left thigh hematoma in addition to renal failure due to tobramycin toxicity for multi-drug resistant Pseudomonas.  She also underwent debridement of a right lower extremity wound due to an infected hematoma.  She was ultimately discharged to [facility], where she has stayed until discharge home on 5/18/18.  Pulmonology felt that she would not be able to wean and recommended discharge home or to TCU and son elected for discharge home.      He reports he was not set up with anyone who would be able to manage a home vent, reports not being given detailed sliding scale insulin instructions (no dosage parameters), was unable to obtain nebs due to lack of appropriate ICD code on discharge orders, did not have supplies at home to replace clogged tube feeding piece in addition to concerns about the competency of the home nurse.  Due to these issues he presented to [facility], unclear as to why he did not return to [facility], which is very familiar with the patient's case.  He reports his mother has otherwise been stable with the exception that she experienced several acute desaturation events over the past 2 days which resolved with suctioning.  He reports about 1 month ago she was able to tolerate being off the ventilator for about 16 hours/day and was able to speak with passy wilner valve.    Son reports his  goals are for his mother to be able to wean from the vent for at least a few hours and be able to speak with valve and possible eat some food by mouth.  He understands that she may never be able to fully wean from the vent and is accepting of this, however, he wants any reversible conditions treated.    Past Medical History    Paroxysmal atrial fibrillation  Chronic respiratory failure secondary to recurrent pleural effusions and deconditioning  Severe aortic stenosis  Chronic diastolic congestive heart failure  Diabetes mellitus type 2  Asthma   Dysphagia with feeding tube dependence  History of critical limb ischemia    Past Surgical History   D&C  Tracheostomy  PEG tube placement    Prior to Admission Medications   Prior to Admission Medications   Prescriptions Last Dose Informant Patient Reported? Taking?   Acetaminophen (TYLENOL PO)  at PRN Care Giver Yes Yes   Sig: Take 650 mg by mouth every 8 hours as needed for mild pain   Emollient (HYDROPHOR) OINT  at PRN Care Giver Yes Yes   Sig: Externally apply topically daily as needed   MELATONIN PO 2018 at HS Care Giver Yes Yes   Sig: Take 6 mg by mouth At Bedtime   METOPROLOL TARTRATE PO 2018 at NOON Care Giver Yes Yes   Si mg by Oral or G tube route every 8 hours    MetOLazone (ZAROXOLYN PO)  at PRN Care Giver Yes Yes   Sig: Take 2.5 mg by mouth daily as needed for other (BODY WEIGHT OVER 192 LBS)   Mirtazapine (REMERON PO) 2018 at HS Care Giver Yes Yes   Sig: 15 mg by Oral or G tube route At Bedtime   QUEtiapine Fumarate (SEROQUEL PO)  at PRN Care Giver Yes Yes   Sig: Take 6.25 mg by mouth every 8 hours as needed (AGITATION.)   SIMETHICONE-80 PO  at PRN Care Giver Yes Yes   Sig: Take 80 mg by mouth 4 times daily as needed for intestinal gas or other (FLATULENCE)   Torsemide (DEMADEX PO) 2018 at PM Care Giver Yes Yes   Sig: 10 mg by Oral or G tube route 2 times daily (before meals)   WARFARIN SODIUM PO 2018 at PM Care Giver Yes Yes    Sig: Take 4 mg by mouth daily   bacitracin 500 UNIT/GM OINT 2018 at AM Care Giver Yes Yes   Sig: Apply topically 2 times daily   bisacodyl (DULCOLAX) 10 MG Suppository  at PRN Care Giver Yes Yes   Sig: Place 10 mg rectally daily as needed for constipation   chlorhexidine (PERIDEX) 0.12 % solution 2018 at AM Care Giver Yes Yes   Sig: Take 15 mLs by mouth 2 times daily   fluticasone (FLONASE) 50 MCG/ACT spray 2018 at AM Care Giver Yes Yes   Sig: Spray 1 spray into both nostrils daily   insulin glargine (LANTUS SOLOSTAR) 100 UNIT/ML pen 2018 at HS Care Giver Yes Yes   Sig: Inject 12 Units Subcutaneous At Bedtime   insulin lispro (HUMALOG KWIKPEN) 100 UNIT/ML injection 2018 at NOON Care Giver Yes Yes   Sig: Inject 0.12 Units Subcutaneous every 6 hours NO SLIDING SCALE WAS GIVEN. PREVIOUS SS, WHILE AT [facility], WENT UP TO 16 UNITS.   ipratropium - albuterol 0.5 mg/2.5 mg/3 mL (DUONEB) 0.5-2.5 (3) MG/3ML neb solution  at PRN Care Giver Yes Yes   Sig: Take 1 vial by nebulization 2 times daily as needed for wheezing   lactobacillus TABS 2018 at AM Care Giver Yes Yes   Si tablets by Oral or G tube route 2 times daily   sennosides (SENOKOT) 8.8 MG/5ML syrup  at PRN Care Giver Yes Yes   Sig: Take 5 mLs by mouth daily as needed for constipation      Facility-Administered Medications: None     Allergies   Allergies   Allergen Reactions     Amikacin      Amiodarone      Codeine      Dexmedetomidine      Lisinopril      Penicillins      Sulfa Drugs            Physical Exam   Temp: 98.8  F (37.1  C) Temp src: Bladder BP: 130/68   Heart Rate: 79 Resp: 16 SpO2: 100 % O2 Device: Mechanical Ventilator    Vital Signs with Ranges  Temp:  [98.4  F (36.9  C)-99.1  F (37.3  C)] 98.8  F (37.1  C)  Heart Rate:  [] 79  Resp:  [0-29] 16  BP: (106-140)/() 130/68  FiO2 (%):  [40 %] 40 %  SpO2:  [87 %-100 %] 100 %  184 lbs 11.93 oz    Constitutional:  well nourished female, easily awakened and mouths  words.   Eyes: pupils equal, round and reactive to light,  HEENT: , trach in place, [place]   Respiratory: lungs clear to auscultation bilaterally,, no tachypnea  Cardiovascular: RR rhythm, normal S1/S2, no murmur, rubs or gallops  GI: abdomen soft, non-tender, somewhat distended, normal bowel sounds, no hepatosplenomegaly, PEG tube in place, belching frequently  Lymph/Hematologic: 2+ thigh edema  Skin: dressing over skin wound right thigh.   Musculoskeletal: Extremities warm and well perfused      I  Data   Data reviewed today:  I personally reviewed no images or EKG's today.    Recent Labs  Lab 05/25/18  0455 05/24/18  0530 05/23/18  0443   WBC 10.3 9.0 9.6   HGB 10.8* 8.7* 9.5*   MCV 90 90 89    254 284   INR 2.49* 3.44* 3.11*    140 139   POTASSIUM 3.4 3.5 3.6   CHLORIDE 97 99 96   CO2 36* 34* 34*   BUN 65* 66* 64*   CR 1.15* 1.14* 1.20*   ANIONGAP 7 7 9   KRISTOFER 8.8 8.3* 8.6   * 198* 170*   ALBUMIN  --   --  2.3*   PROTTOTAL  --   --  6.4*   BILITOTAL  --   --  0.5   ALKPHOS  --   --  224*   ALT  --   --  47   AST  --   --  49*       Recent Results (from the past 24 hour(s))   XR Abdomen Port 1 View    Narrative    XR ABDOMEN PORT 1 VW  5/25/2018 5:50 AM     HISTORY:  Eval for ileus;     COMPARISON: None.    FINDINGS:  Gastrostomy tube is in position. A catheter is seen  projected over the low pelvis. There is mild distention of loops of  small bowel. Gas is seen in the colon. The colon is not significantly  distended.      Impression    IMPRESSION: Mild nonspecific distention of small bowel loops. This  could be due to an adynamic ileus.    KASHMIR FLORES MD

## 2018-05-25 NOTE — PLAN OF CARE
Problem: Patient Care Overview  Goal: Plan of Care/Patient Progress Review  Outcome: No Change  Patient denies pain , scant thick creme colored secretions, Tube feeding at goal , sats 100% with Fio2 @ 40%, questions answered and emotional support given

## 2018-05-26 LAB
ANION GAP SERPL CALCULATED.3IONS-SCNC: 4 MMOL/L (ref 3–14)
BUN SERPL-MCNC: 60 MG/DL (ref 7–30)
CALCIUM SERPL-MCNC: 8.6 MG/DL (ref 8.5–10.1)
CHLORIDE SERPL-SCNC: 99 MMOL/L (ref 94–109)
CO2 SERPL-SCNC: 38 MMOL/L (ref 20–32)
CREAT SERPL-MCNC: 1.03 MG/DL (ref 0.52–1.04)
ERYTHROCYTE [DISTWIDTH] IN BLOOD BY AUTOMATED COUNT: 18.2 % (ref 10–15)
GFR SERPL CREATININE-BSD FRML MDRD: 51 ML/MIN/1.7M2
GLUCOSE BLDC GLUCOMTR-MCNC: 135 MG/DL (ref 70–99)
GLUCOSE BLDC GLUCOMTR-MCNC: 152 MG/DL (ref 70–99)
GLUCOSE BLDC GLUCOMTR-MCNC: 152 MG/DL (ref 70–99)
GLUCOSE BLDC GLUCOMTR-MCNC: 176 MG/DL (ref 70–99)
GLUCOSE BLDC GLUCOMTR-MCNC: 179 MG/DL (ref 70–99)
GLUCOSE SERPL-MCNC: 140 MG/DL (ref 70–99)
HCT VFR BLD AUTO: 30.1 % (ref 35–47)
HGB BLD-MCNC: 9.1 G/DL (ref 11.7–15.7)
INR PPP: 2.34 (ref 0.86–1.14)
MAGNESIUM SERPL-MCNC: 2.2 MG/DL (ref 1.6–2.3)
MCH RBC QN AUTO: 27.2 PG (ref 26.5–33)
MCHC RBC AUTO-ENTMCNC: 30.2 G/DL (ref 31.5–36.5)
MCV RBC AUTO: 90 FL (ref 78–100)
PHOSPHATE SERPL-MCNC: 3 MG/DL (ref 2.5–4.5)
PLATELET # BLD AUTO: 246 10E9/L (ref 150–450)
POTASSIUM SERPL-SCNC: 3.3 MMOL/L (ref 3.4–5.3)
POTASSIUM SERPL-SCNC: 4.1 MMOL/L (ref 3.4–5.3)
RBC # BLD AUTO: 3.34 10E12/L (ref 3.8–5.2)
SODIUM SERPL-SCNC: 141 MMOL/L (ref 133–144)
WBC # BLD AUTO: 8.2 10E9/L (ref 4–11)

## 2018-05-26 PROCEDURE — 27210432 ZZH NUTRITION PRODUCT RENAL BASIC LITER

## 2018-05-26 PROCEDURE — 94003 VENT MGMT INPAT SUBQ DAY: CPT

## 2018-05-26 PROCEDURE — A9270 NON-COVERED ITEM OR SERVICE: HCPCS | Mod: GY | Performed by: INTERNAL MEDICINE

## 2018-05-26 PROCEDURE — 94640 AIRWAY INHALATION TREATMENT: CPT | Mod: 76

## 2018-05-26 PROCEDURE — 20000003 ZZH R&B ICU

## 2018-05-26 PROCEDURE — 84100 ASSAY OF PHOSPHORUS: CPT | Performed by: HOSPITALIST

## 2018-05-26 PROCEDURE — 84132 ASSAY OF SERUM POTASSIUM: CPT | Performed by: INTERNAL MEDICINE

## 2018-05-26 PROCEDURE — 25000132 ZZH RX MED GY IP 250 OP 250 PS 637: Mod: GY | Performed by: HOSPITALIST

## 2018-05-26 PROCEDURE — 25000125 ZZHC RX 250

## 2018-05-26 PROCEDURE — 40000008 ZZH STATISTIC AIRWAY CARE

## 2018-05-26 PROCEDURE — 99233 SBSQ HOSP IP/OBS HIGH 50: CPT | Performed by: INTERNAL MEDICINE

## 2018-05-26 PROCEDURE — 40000239 ZZH STATISTIC VAT ROUNDS

## 2018-05-26 PROCEDURE — 00000146 ZZHCL STATISTIC GLUCOSE BY METER IP

## 2018-05-26 PROCEDURE — 25000131 ZZH RX MED GY IP 250 OP 636 PS 637: Mod: GY | Performed by: INTERNAL MEDICINE

## 2018-05-26 PROCEDURE — 25000125 ZZHC RX 250: Performed by: HOSPITALIST

## 2018-05-26 PROCEDURE — 85610 PROTHROMBIN TIME: CPT | Performed by: HOSPITALIST

## 2018-05-26 PROCEDURE — 83735 ASSAY OF MAGNESIUM: CPT | Performed by: HOSPITALIST

## 2018-05-26 PROCEDURE — A9270 NON-COVERED ITEM OR SERVICE: HCPCS | Mod: GY | Performed by: HOSPITALIST

## 2018-05-26 PROCEDURE — 25000132 ZZH RX MED GY IP 250 OP 250 PS 637: Mod: GY | Performed by: INTERNAL MEDICINE

## 2018-05-26 PROCEDURE — 85027 COMPLETE CBC AUTOMATED: CPT | Performed by: HOSPITALIST

## 2018-05-26 PROCEDURE — 40000275 ZZH STATISTIC RCP TIME EA 10 MIN

## 2018-05-26 PROCEDURE — 80048 BASIC METABOLIC PNL TOTAL CA: CPT | Performed by: HOSPITALIST

## 2018-05-26 RX ORDER — POTASSIUM CL/LIDO/0.9 % NACL 10MEQ/0.1L
10 INTRAVENOUS SOLUTION, PIGGYBACK (ML) INTRAVENOUS
Status: DISCONTINUED | OUTPATIENT
Start: 2018-05-26 | End: 2018-06-14 | Stop reason: HOSPADM

## 2018-05-26 RX ORDER — METOPROLOL TARTRATE 1 MG/ML
5 INJECTION, SOLUTION INTRAVENOUS EVERY 6 HOURS PRN
Status: DISCONTINUED | OUTPATIENT
Start: 2018-05-26 | End: 2018-06-14 | Stop reason: HOSPADM

## 2018-05-26 RX ORDER — POTASSIUM CHLORIDE 7.45 MG/ML
10 INJECTION INTRAVENOUS
Status: DISCONTINUED | OUTPATIENT
Start: 2018-05-26 | End: 2018-06-14 | Stop reason: HOSPADM

## 2018-05-26 RX ORDER — POTASSIUM CHLORIDE 20MEQ/15ML
20 LIQUID (ML) ORAL DAILY
Status: DISCONTINUED | OUTPATIENT
Start: 2018-05-27 | End: 2018-06-01

## 2018-05-26 RX ORDER — POTASSIUM CHLORIDE 1500 MG/1
20-40 TABLET, EXTENDED RELEASE ORAL
Status: DISCONTINUED | OUTPATIENT
Start: 2018-05-26 | End: 2018-06-14 | Stop reason: HOSPADM

## 2018-05-26 RX ORDER — POTASSIUM CHLORIDE 29.8 MG/ML
20 INJECTION INTRAVENOUS
Status: DISCONTINUED | OUTPATIENT
Start: 2018-05-26 | End: 2018-06-14 | Stop reason: HOSPADM

## 2018-05-26 RX ORDER — METOPROLOL TARTRATE 1 MG/ML
INJECTION, SOLUTION INTRAVENOUS
Status: COMPLETED
Start: 2018-05-26 | End: 2018-05-26

## 2018-05-26 RX ORDER — METOPROLOL TARTRATE 50 MG
50 TABLET ORAL EVERY 8 HOURS
Status: DISCONTINUED | OUTPATIENT
Start: 2018-05-27 | End: 2018-06-03

## 2018-05-26 RX ORDER — POTASSIUM CHLORIDE 1.5 G/1.58G
20-40 POWDER, FOR SOLUTION ORAL
Status: DISCONTINUED | OUTPATIENT
Start: 2018-05-26 | End: 2018-06-14 | Stop reason: HOSPADM

## 2018-05-26 RX ORDER — WARFARIN SODIUM 4 MG/1
4 TABLET ORAL
Status: COMPLETED | OUTPATIENT
Start: 2018-05-26 | End: 2018-05-26

## 2018-05-26 RX ADMIN — Medication 1 CAPSULE: at 08:37

## 2018-05-26 RX ADMIN — METOPROLOL TARTRATE 5 MG: 5 INJECTION, SOLUTION INTRAVENOUS at 17:33

## 2018-05-26 RX ADMIN — INSULIN ASPART 1 UNITS: 100 INJECTION, SOLUTION INTRAVENOUS; SUBCUTANEOUS at 15:59

## 2018-05-26 RX ADMIN — MELATONIN TAB 3 MG 6 MG: 3 TAB at 21:50

## 2018-05-26 RX ADMIN — MIRTAZAPINE 15 MG: 7.5 TABLET ORAL at 21:50

## 2018-05-26 RX ADMIN — FLUTICASONE PROPIONATE 1 SPRAY: 50 SPRAY, METERED NASAL at 08:37

## 2018-05-26 RX ADMIN — CHLORHEXIDINE GLUCONATE 15 ML: 1.2 RINSE ORAL at 08:36

## 2018-05-26 RX ADMIN — Medication 1 CAPSULE: at 21:51

## 2018-05-26 RX ADMIN — INSULIN ASPART 1 UNITS: 100 INJECTION, SOLUTION INTRAVENOUS; SUBCUTANEOUS at 08:35

## 2018-05-26 RX ADMIN — POTASSIUM CHLORIDE 20 MEQ: 1.5 POWDER, FOR SOLUTION ORAL at 11:50

## 2018-05-26 RX ADMIN — WARFARIN SODIUM 4 MG: 4 TABLET ORAL at 17:49

## 2018-05-26 RX ADMIN — INSULIN ASPART 1 UNITS: 100 INJECTION, SOLUTION INTRAVENOUS; SUBCUTANEOUS at 11:46

## 2018-05-26 RX ADMIN — SIMETHICONE 40 MG: 20 EMULSION ORAL at 09:15

## 2018-05-26 RX ADMIN — POTASSIUM CHLORIDE 40 MEQ: 1.5 POWDER, FOR SOLUTION ORAL at 05:24

## 2018-05-26 RX ADMIN — TORSEMIDE 10 MG: 10 TABLET ORAL at 08:37

## 2018-05-26 RX ADMIN — INSULIN ASPART 1 UNITS: 100 INJECTION, SOLUTION INTRAVENOUS; SUBCUTANEOUS at 19:41

## 2018-05-26 RX ADMIN — IPRATROPIUM BROMIDE AND ALBUTEROL SULFATE 3 ML: .5; 3 SOLUTION RESPIRATORY (INHALATION) at 16:36

## 2018-05-26 RX ADMIN — CHLORHEXIDINE GLUCONATE 15 ML: 1.2 RINSE ORAL at 21:52

## 2018-05-26 RX ADMIN — METOPROLOL TARTRATE 25 MG: 25 TABLET, FILM COATED ORAL at 15:49

## 2018-05-26 RX ADMIN — INSULIN GLARGINE 16 UNITS: 100 INJECTION, SOLUTION SUBCUTANEOUS at 21:50

## 2018-05-26 RX ADMIN — METOPROLOL TARTRATE 25 MG: 25 TABLET, FILM COATED ORAL at 08:37

## 2018-05-26 RX ADMIN — TORSEMIDE 10 MG: 10 TABLET ORAL at 15:49

## 2018-05-26 NOTE — PROGRESS NOTES
[facility] ICU RESPIRATORY NOTE  Date of Admission: 5/21/18  Date of Intubation (most recent): 5/21/18  Reason for Mechanical Ventilation: Acute on chronic hypoxic respiratory failure with chronic vent dependency.  Number of Days on Mechanical Ventilation: 6    Reason for No Pressure Support Trial: resting for the night.  Ventilation Mode: CMV/AC  (Continuous Mandatory Ventilation/ Assist Control)  FiO2 (%): 40 %  Rate Set (breaths/minute): 10 breaths/min  Tidal Volume Set (mL): 360 mL  PEEP (cm H2O): 5 cmH2O  Pressure Support (cm H2O): 13 cmH2O  Oxygen Concentration (%): 40 %  Resp: 15      Significant Events Today: none during this shift    ABG Results:    Recent Labs  Lab 05/22/18  0127   PH 7.48*   PCO2 45   PO2 145*   HCO3 34*   O2PER 40             Plan:  Pt remains full vent support. RT will assess for daily PS trial in the morning.  5/26/2018  Keeley Virk

## 2018-05-26 NOTE — PROGRESS NOTES
Forwarded CC note    Care Coordination:    Background:   Yun reported patient was discharge with 24 hour Home Care from [facility].  Son considered [facility] vent TCU, but chose home.  Supplies for continued care not arranged prior to discharge.  Homecare agency did not meet expectations.       Plan:  Family wants new Home Care Agency.  Currently, the following were contacted (related info will be updated below the agency name).     1. [facility] ([place]) [phone]  2. [facility] ([place]), [phone]  + pt would need to go to one of their group homes prior to d/c home, to fill the gap between hospitalization and when they would have staff to take the homecare case  + daughter would want to tour   3. [facility] ([place]) [phone].   + has wait list of 20 people that need trach [facility] care with 12-24 hour care.  4. [facility] ([place]) met with family in hospital  + info faxed  + Need to hire people for this case, takes 1-1.5 wks  + Cost $85/hr  5. [facility] ([place]), referral faxed  6. [facility]  + quote obtained  7. [facility]   + they are booked 6-10 weeks out  8. [facility] ([place]) [phone]                  + meeting with daughter Fri 5/25  9. [facility] [phone]                  + talking to daughter Wed 5/30  10. [facility] [phone]                  + daughter left voicemail  11. [facility] ([place]) [phone]                  + Juan Antonio has texted his nurses and will connect with daughter 5/25     Ventilator supplies, per daughter, are through  + [employer]                         + company # [phone]  Supplies/Orders needed:                        1. Home OT/PT, RN, RT for lab draws and teaching cares (PEG, trach / speaking valve, wound) - to be ordered at d/c                        2. Sliding scale - make sure this is written out on d/c papers                         3. Wound Care - make sure instructions are written on d/c papers                        4. Nebs - DME order needed                        5. Home INR machine - DME order possibly needed vs. homecare to manage                        6. Glucometer - Dr. Perez has ordered                          Decided not needed.                        8. Wheelchair - needs order, dtr would like to use [facility]                         9. G-tube - nutrition order and supplies                         10. Urine wicking type device - would like for at home if possible                          Other questions:   1. Can a swallow test be done in the hospital? - yes     Aliya Grijalva RN, BSN  [facility] Care Coordinator   Mobile Phone: [phone]             Revision History       Date/Time User Provider Type Action     5/25/2018  3:56 PM Aliya Grijalva, CLEMENTE Case Manager Addend     5/25/2018  3:52 PM Aliya Grijalva, RN Case Manager Sign     View Details Report

## 2018-05-26 NOTE — PROGRESS NOTES
Dr. Tracy notified that patient in going in and out of rapid atrial fib with rates up to 140, /72 ,

## 2018-05-26 NOTE — PLAN OF CARE
Problem: Patient Care Overview  Goal: Plan of Care/Patient Progress Review  Outcome: No Change  Neuro: PERRL. Appropriate but seems pleasantly confused. Answers yes/no questions. A&O to self- says yes to anything I ask.   Cardiac: A. Fib w/ RVR. Pulses palpable.   Resp: Full vent support overnight. Lungs clear, diminished. Small creamy secretions from trach.   GI: TF at goal via PEG. Bowel sounds active. SS insulin. No BM, passing gas.  : Morris in place, good UOP.

## 2018-05-26 NOTE — PROGRESS NOTES
[facility]    Daily Progress Note  [facility]       Date of Admission:  5/21/2018    Assessment & Plan   Jacques Solis is a 88 year old female with past history A-fib, CHF, severe AS, DM II, asthma, chronic respiratory failure with vent dependence who was discharged home from [facility] on 5/18/18 but did not have appropriate home services arranged, but son also reporting a recent decline in respiratory status.    Acute on chronic hypoxic respiratory failure with vent dependence:  Chronic failure felt due to pleural effusions and overall debility.  Pulmonologist at [facility] felt she would chronically be vent dependent and weaning efforts were stopped.  CT chest in ED shows small to moderated loculated bilateral pleural effusions with atelectasis.  Also a 4 cm gil opacity in left upper lobe, but no SIRS criteria present. But daughter says a month ago she was off ventilator.   Ventilator status stable.    I placed on PS 15 and has -300.    - No acute issues, so will work to arrange discharge to home. Social work and care coordination involved    Paroxysmal A-fib:   NSR now  - continue PTA metoprolol (note son has been dosing this at 25 mg q8h)  - INR supra therapeutic (though discharge summary states goal of 2.5-3); continue coumadin, PharmD to dose    Hx of PE  - coumadin as above    Severe AS  Chronic diastolic CHF  TTE 3/2018 with EF 55-60%, severe AS (area of 0.6-0.7) and mild regurgitation, moderate MR.  Is reportedly not a TAVR candidate.  - continue PTA metolazone and torsemide  - Check magnesium level  - Start schedule daily potassium    DM II:  Increase lanus 16 units daily with SSI but daughter requests oral DM medications only except for sliding scale.     Asthma:  Continue PTA nebs.    Recent acute blood loss anemia:  Reportedly due to supratherapeutic lovenox in setting of PAULY.  Hgb at recent discharge 8.4, now improved to 10 g/dL.    Possible  "encephalopathy  Discharge summary states probable encephalopathy due to critical illness superimposed on some dementia.  Son denies any history of dementia, patient following commands ok   - continue PTA Seroquel    Nonhealing right leg wound  Due to hematoma with necrotic eschar now s/p debridement  - WOC consult    Dysphagia  - continue PTA tube feeds    Mild abdominal distension  -possible adynamic ileus, but tolerating tube feeds  -simethicone prn    DVT Prophylaxis: Warfarin    # Pain Assessment:  Current Pain Score 5/26/2018   Patient currently in pain? denies   Pain score (0-10) -   Pain location -   Pain descriptors -   CPOT pain score -   Jacques newell pain level was assessed and she currently denies pain.          Disposition: Expected discharge as soon as home care assistance is arranged.    Florentin Tracy    Primary Care Physician   Michael Mccarthy     24 hour events:  Tolerating weaning on ventilator.  Small to moderate amount of secretions.  Attempts to communicate but difficult to understand, awake and alert but does not follow commands.    Chief Complaint   Lack of home resources for management    History is obtained from the patient's son (who is an anesthesiologist) and chart review. Recent discharge summary from [place] said the following \"87-year-old female well-known to the critical care service with a history of some mild memory impairment, but has been essentially hospital-dependent for the last 9 months with severe aortic stenosis, not a candidate for TAVR, chronic diastolic heart failure, chronic bilateral recurrent pleural effusions, chronic vent dependence, history of pulmonary embolism, atrial fibrillation, peripheral artery disease with history of critical limb ischemia, who was transferred to [facility] from a long-term care facility. She was transferred due to acute blood loss anemia, elevated leukocytosis and acute renal failure. She was recently started on tobramycin for " "pseudomonas growing in her sputum.\"      History of Present Illness   Jacques Solis is a 88 year old female who presents with inadequate home support and possible acute on chronic hypoxic respiratory failure.  She has essentially been hospitalized since September 2017 when she was admitted to [facility] for critical limb ischemia and underwent balloon angioplasty which was complicated by femoral artery perforation with hemorrhagic shock.  She also had chronic respiratory and was unable to wean from the vent and was trached and pegged on 11/13/17.  She was also noted to have some encephalopathy.  Ultimately she was discharged to [facility]. She has essentially moved back and forth from [facility] and [facility] and then to [facility] and [facility] ever since.  Her most recent admission to [facility] 4/26-5/4 was due to acute blood loss anemia due to left thigh hematoma in addition to renal failure due to tobramycin toxicity for multi-drug resistant Pseudomonas.  She also underwent debridement of a right lower extremity wound due to an infected hematoma.  She was ultimately discharged to [facility], where she has stayed until discharge home on 5/18/18.  Pulmonology felt that she would not be able to wean and recommended discharge home or to TCU and son elected for discharge home.      He reports he was not set up with anyone who would be able to manage a home vent, reports not being given detailed sliding scale insulin instructions (no dosage parameters), was unable to obtain nebs due to lack of appropriate ICD code on discharge orders, did not have supplies at home to replace clogged tube feeding piece in addition to concerns about the competency of the home nurse.  Due to these issues he presented to [facility], unclear as to why he did not return to [facility], which is very familiar with the patient's case.  He reports his mother has otherwise been stable with the exception that she experienced several acute desaturation events over the past 2 " days which resolved with suctioning.  He reports about 1 month ago she was able to tolerate being off the ventilator for about 16 hours/day and was able to speak with passy wilner valve.    Son reports his goals are for his mother to be able to wean from the vent for at least a few hours and be able to speak with valve and possible eat some food by mouth.  He understands that she may never be able to fully wean from the vent and is accepting of this, however, he wants any reversible conditions treated.    Past Medical History    Paroxysmal atrial fibrillation  Chronic respiratory failure secondary to recurrent pleural effusions and deconditioning  Severe aortic stenosis  Chronic diastolic congestive heart failure  Diabetes mellitus type 2  Asthma   Dysphagia with feeding tube dependence  History of critical limb ischemia    Past Surgical History   D&C  Tracheostomy  PEG tube placement    Prior to Admission Medications   Prior to Admission Medications   Prescriptions Last Dose Informant Patient Reported? Taking?   Acetaminophen (TYLENOL PO)  at PRN Care Giver Yes Yes   Sig: Take 650 mg by mouth every 8 hours as needed for mild pain   Emollient (HYDROPHOR) OINT  at PRN Care Giver Yes Yes   Sig: Externally apply topically daily as needed   MELATONIN PO 2018 at HS Care Giver Yes Yes   Sig: Take 6 mg by mouth At Bedtime   METOPROLOL TARTRATE PO 2018 at [facility] Care Giver Yes Yes   Si mg by Oral or G tube route every 8 hours    MetOLazone (ZAROXOLYN PO)  at PRN Care Giver Yes Yes   Sig: Take 2.5 mg by mouth daily as needed for other (BODY WEIGHT OVER 192 LBS)   Mirtazapine (REMERON PO) 2018 at  Care Giver Yes Yes   Sig: 15 mg by Oral or G tube route At Bedtime   QUEtiapine Fumarate (SEROQUEL PO)  at PRN Care Giver Yes Yes   Sig: Take 6.25 mg by mouth every 8 hours as needed (AGITATION.)   SIMETHICONE-80 PO  at PRN Care Giver Yes Yes   Sig: Take 80 mg by mouth 4 times daily as needed for intestinal gas or  other (FLATULENCE)   Torsemide (DEMADEX PO) 2018 at PM Care Giver Yes Yes   Sig: 10 mg by Oral or G tube route 2 times daily (before meals)   WARFARIN SODIUM PO 2018 at PM Care Giver Yes Yes   Sig: Take 4 mg by mouth daily   bacitracin 500 UNIT/GM OINT 2018 at AM Care Giver Yes Yes   Sig: Apply topically 2 times daily   bisacodyl (DULCOLAX) 10 MG Suppository  at PRN Care Giver Yes Yes   Sig: Place 10 mg rectally daily as needed for constipation   chlorhexidine (PERIDEX) 0.12 % solution 2018 at AM Care Giver Yes Yes   Sig: Take 15 mLs by mouth 2 times daily   fluticasone (FLONASE) 50 MCG/ACT spray 2018 at AM Care Giver Yes Yes   Sig: Spray 1 spray into both nostrils daily   insulin glargine (LANTUS SOLOSTAR) 100 UNIT/ML pen 2018 at HS Care Giver Yes Yes   Sig: Inject 12 Units Subcutaneous At Bedtime   insulin lispro (HUMALOG KWIKPEN) 100 UNIT/ML injection 2018 at [facility] Care Giver Yes Yes   Sig: Inject 0.12 Units Subcutaneous every 6 hours NO SLIDING SCALE WAS GIVEN. PREVIOUS SS, WHILE AT [facility], WENT UP TO 16 UNITS.   ipratropium - albuterol 0.5 mg/2.5 mg/3 mL (DUONEB) 0.5-2.5 (3) MG/3ML neb solution  at PRN Care Giver Yes Yes   Sig: Take 1 vial by nebulization 2 times daily as needed for wheezing   lactobacillus TABS 2018 at AM Care Giver Yes Yes   Si tablets by Oral or G tube route 2 times daily   sennosides (SENOKOT) 8.8 MG/5ML syrup  at PRN Care Giver Yes Yes   Sig: Take 5 mLs by mouth daily as needed for constipation      Facility-Administered Medications: None     Allergies   Allergies   Allergen Reactions     Amikacin      Amiodarone      Codeine      Dexmedetomidine      Lisinopril      Penicillins      Sulfa Drugs            Physical Exam   Temp: 98.8  F (37.1  C) Temp src: Bladder BP: 113/71   Heart Rate: 87 Resp: 18 SpO2: 100 % O2 Device: Mechanical Ventilator    Vital Signs with Ranges  Temp:  [98.4  F (36.9  C)-99  F (37.2  C)] 98.8  F (37.1  C)  Heart Rate:   [65-90] 87  Resp:  [9-33] 18  BP: (106-132)/(59-94) 113/71  FiO2 (%):  [40 %] 40 %  SpO2:  [97 %-100 %] 100 %  184 lbs 1.35 oz    Constitutional:  well nourished female,awake and alert.  Mouths words but difficult to understand.   Eyes: pupils equal, round and reactive to light,  HEENT: , trach in place, [place]   Respiratory: lungs clear to auscultation bilaterally,, no tachypnea  Cardiovascular: RR rhythm, normal S1/S2, no murmur, rubs or gallops  GI: abdomen soft, non-tender, somewhat distended, normal bowel sounds, no hepatosplenomegaly, PEG tube in place, belching frequently  Lymph/Hematologic: 1+ thigh edema  Skin: dressing over skin wound right thigh.   Musculoskeletal: Extremities warm and well perfused      I  Data   Data reviewed today:  I personally reviewed no images or EKG's today.    Recent Labs  Lab 05/26/18  0400 05/25/18  0455 05/24/18  0530 05/23/18  0443   WBC 8.2 10.3 9.0 9.6   HGB 9.1* 10.8* 8.7* 9.5*   MCV 90 90 90 89    347 254 284   INR 2.34* 2.49* 3.44* 3.11*    140 140 139   POTASSIUM 3.3* 3.4 3.5 3.6   CHLORIDE 99 97 99 96   CO2 38* 36* 34* 34*   BUN 60* 65* 66* 64*   CR 1.03 1.15* 1.14* 1.20*   ANIONGAP 4 7 7 9   KRISTOFER 8.6 8.8 8.3* 8.6   * 148* 198* 170*   ALBUMIN  --   --   --  2.3*   PROTTOTAL  --   --   --  6.4*   BILITOTAL  --   --   --  0.5   ALKPHOS  --   --   --  224*   ALT  --   --   --  47   AST  --   --   --  49*       No results found for this or any previous visit (from the past 24 hour(s)).

## 2018-05-26 NOTE — PROGRESS NOTES
Attempted to do [place]-Suicide Severity Rating Scale on pt but unsuccessful. Pt is disoriented to place, time, and situation. Pt states no to the first two questions but unsure if mental status is appropriate. Will continue to reassess when appropriate.

## 2018-05-26 NOTE — PROGRESS NOTES
[facility] ICU RESPIRATORY NOTE  Date of Admission: 5/21/18  Date of Intubation (most recent): 5/21/18  Reason for Mechanical Ventilation: Acute on chronic hypoxic respiratory failure with chronic vent dependency.  Met Criteria for Pressure Support Trial: Yes   Length of Pressure Support Trial: PS 15/5 for 7 hours and 20 minutes.  Reason for Stopping Pressure Support; Pt developed rapid A fib.  Number of Days on Mechanical Ventilation: 6  Significant Events Today: PS performed today      ABG Results: none today     Plan: Pt remains on full ventilator support and assess daily for weaning.     Curtis Durham RT.

## 2018-05-27 ENCOUNTER — APPOINTMENT (OUTPATIENT)
Dept: PHYSICAL THERAPY | Facility: CLINIC | Age: 83
DRG: 207 | End: 2018-05-27
Payer: MEDICARE

## 2018-05-27 LAB
ANION GAP SERPL CALCULATED.3IONS-SCNC: 4 MMOL/L (ref 3–14)
BUN SERPL-MCNC: 61 MG/DL (ref 7–30)
CALCIUM SERPL-MCNC: 8.6 MG/DL (ref 8.5–10.1)
CHLORIDE SERPL-SCNC: 99 MMOL/L (ref 94–109)
CO2 SERPL-SCNC: 37 MMOL/L (ref 20–32)
CREAT SERPL-MCNC: 1.11 MG/DL (ref 0.52–1.04)
ERYTHROCYTE [DISTWIDTH] IN BLOOD BY AUTOMATED COUNT: 18.3 % (ref 10–15)
GFR SERPL CREATININE-BSD FRML MDRD: 46 ML/MIN/1.7M2
GLUCOSE BLDC GLUCOMTR-MCNC: 148 MG/DL (ref 70–99)
GLUCOSE BLDC GLUCOMTR-MCNC: 175 MG/DL (ref 70–99)
GLUCOSE BLDC GLUCOMTR-MCNC: 187 MG/DL (ref 70–99)
GLUCOSE BLDC GLUCOMTR-MCNC: 189 MG/DL (ref 70–99)
GLUCOSE BLDC GLUCOMTR-MCNC: 201 MG/DL (ref 70–99)
GLUCOSE BLDC GLUCOMTR-MCNC: 204 MG/DL (ref 70–99)
GLUCOSE SERPL-MCNC: 204 MG/DL (ref 70–99)
HCT VFR BLD AUTO: 31.6 % (ref 35–47)
HGB BLD-MCNC: 9.5 G/DL (ref 11.7–15.7)
INR PPP: 2.49 (ref 0.86–1.14)
MCH RBC QN AUTO: 27.2 PG (ref 26.5–33)
MCHC RBC AUTO-ENTMCNC: 30.1 G/DL (ref 31.5–36.5)
MCV RBC AUTO: 91 FL (ref 78–100)
PLATELET # BLD AUTO: 258 10E9/L (ref 150–450)
POTASSIUM SERPL-SCNC: 4.2 MMOL/L (ref 3.4–5.3)
RBC # BLD AUTO: 3.49 10E12/L (ref 3.8–5.2)
SODIUM SERPL-SCNC: 140 MMOL/L (ref 133–144)
WBC # BLD AUTO: 11.1 10E9/L (ref 4–11)

## 2018-05-27 PROCEDURE — 94003 VENT MGMT INPAT SUBQ DAY: CPT

## 2018-05-27 PROCEDURE — 40000239 ZZH STATISTIC VAT ROUNDS

## 2018-05-27 PROCEDURE — 00000146 ZZHCL STATISTIC GLUCOSE BY METER IP

## 2018-05-27 PROCEDURE — 85027 COMPLETE CBC AUTOMATED: CPT | Performed by: HOSPITALIST

## 2018-05-27 PROCEDURE — 99233 SBSQ HOSP IP/OBS HIGH 50: CPT | Performed by: INTERNAL MEDICINE

## 2018-05-27 PROCEDURE — 25000132 ZZH RX MED GY IP 250 OP 250 PS 637: Mod: GY | Performed by: HOSPITALIST

## 2018-05-27 PROCEDURE — 97530 THERAPEUTIC ACTIVITIES: CPT | Mod: GP | Performed by: PHYSICAL THERAPIST

## 2018-05-27 PROCEDURE — 25000125 ZZHC RX 250: Performed by: INTERNAL MEDICINE

## 2018-05-27 PROCEDURE — 25000125 ZZHC RX 250: Performed by: HOSPITALIST

## 2018-05-27 PROCEDURE — 27210432 ZZH NUTRITION PRODUCT RENAL BASIC LITER

## 2018-05-27 PROCEDURE — 25000132 ZZH RX MED GY IP 250 OP 250 PS 637: Mod: GY | Performed by: INTERNAL MEDICINE

## 2018-05-27 PROCEDURE — 25000131 ZZH RX MED GY IP 250 OP 636 PS 637: Mod: GY | Performed by: INTERNAL MEDICINE

## 2018-05-27 PROCEDURE — 80048 BASIC METABOLIC PNL TOTAL CA: CPT | Performed by: HOSPITALIST

## 2018-05-27 PROCEDURE — 40000275 ZZH STATISTIC RCP TIME EA 10 MIN

## 2018-05-27 PROCEDURE — A9270 NON-COVERED ITEM OR SERVICE: HCPCS | Mod: GY | Performed by: INTERNAL MEDICINE

## 2018-05-27 PROCEDURE — 94640 AIRWAY INHALATION TREATMENT: CPT | Mod: 76

## 2018-05-27 PROCEDURE — 40000193 ZZH STATISTIC PT WARD VISIT: Performed by: PHYSICAL THERAPIST

## 2018-05-27 PROCEDURE — A9270 NON-COVERED ITEM OR SERVICE: HCPCS | Mod: GY | Performed by: HOSPITALIST

## 2018-05-27 PROCEDURE — 97110 THERAPEUTIC EXERCISES: CPT | Mod: GP | Performed by: PHYSICAL THERAPIST

## 2018-05-27 PROCEDURE — 40000008 ZZH STATISTIC AIRWAY CARE

## 2018-05-27 PROCEDURE — 85610 PROTHROMBIN TIME: CPT | Performed by: HOSPITALIST

## 2018-05-27 PROCEDURE — 20000003 ZZH R&B ICU

## 2018-05-27 RX ORDER — WARFARIN SODIUM 4 MG/1
4 TABLET ORAL
Status: COMPLETED | OUTPATIENT
Start: 2018-05-27 | End: 2018-05-27

## 2018-05-27 RX ADMIN — CHLORHEXIDINE GLUCONATE 15 ML: 1.2 RINSE ORAL at 09:10

## 2018-05-27 RX ADMIN — METOPROLOL TARTRATE 50 MG: 50 TABLET, FILM COATED ORAL at 00:13

## 2018-05-27 RX ADMIN — INSULIN ASPART 1 UNITS: 100 INJECTION, SOLUTION INTRAVENOUS; SUBCUTANEOUS at 12:25

## 2018-05-27 RX ADMIN — INSULIN ASPART 2 UNITS: 100 INJECTION, SOLUTION INTRAVENOUS; SUBCUTANEOUS at 00:19

## 2018-05-27 RX ADMIN — Medication 1 CAPSULE: at 11:10

## 2018-05-27 RX ADMIN — MELATONIN TAB 3 MG 6 MG: 3 TAB at 21:53

## 2018-05-27 RX ADMIN — POTASSIUM CHLORIDE 20 MEQ: 20 SOLUTION ORAL at 11:10

## 2018-05-27 RX ADMIN — TORSEMIDE 10 MG: 10 TABLET ORAL at 08:06

## 2018-05-27 RX ADMIN — INSULIN ASPART 1 UNITS: 100 INJECTION, SOLUTION INTRAVENOUS; SUBCUTANEOUS at 19:43

## 2018-05-27 RX ADMIN — IPRATROPIUM BROMIDE AND ALBUTEROL SULFATE 3 ML: .5; 3 SOLUTION RESPIRATORY (INHALATION) at 00:55

## 2018-05-27 RX ADMIN — METOPROLOL TARTRATE 50 MG: 50 TABLET, FILM COATED ORAL at 15:46

## 2018-05-27 RX ADMIN — INSULIN ASPART 1 UNITS: 100 INJECTION, SOLUTION INTRAVENOUS; SUBCUTANEOUS at 15:40

## 2018-05-27 RX ADMIN — MIRTAZAPINE 15 MG: 7.5 TABLET ORAL at 21:52

## 2018-05-27 RX ADMIN — CHLORHEXIDINE GLUCONATE 15 ML: 1.2 RINSE ORAL at 21:53

## 2018-05-27 RX ADMIN — TORSEMIDE 10 MG: 10 TABLET ORAL at 15:46

## 2018-05-27 RX ADMIN — METOPROLOL TARTRATE 50 MG: 50 TABLET, FILM COATED ORAL at 07:55

## 2018-05-27 RX ADMIN — Medication 6.25 MG: at 11:10

## 2018-05-27 RX ADMIN — INSULIN ASPART 2 UNITS: 100 INJECTION, SOLUTION INTRAVENOUS; SUBCUTANEOUS at 04:51

## 2018-05-27 RX ADMIN — INSULIN ASPART 1 UNITS: 100 INJECTION, SOLUTION INTRAVENOUS; SUBCUTANEOUS at 07:48

## 2018-05-27 RX ADMIN — WARFARIN SODIUM 4 MG: 4 TABLET ORAL at 19:29

## 2018-05-27 RX ADMIN — Medication 1 CAPSULE: at 21:53

## 2018-05-27 RX ADMIN — METOPROLOL TARTRATE 5 MG: 5 INJECTION, SOLUTION INTRAVENOUS at 05:20

## 2018-05-27 RX ADMIN — INSULIN GLARGINE 16 UNITS: 100 INJECTION, SOLUTION SUBCUTANEOUS at 21:53

## 2018-05-27 NOTE — PLAN OF CARE
Problem: Patient Care Overview  Goal: Plan of Care/Patient Progress Review  Discharge Planner PT   Patient plan for discharge: None Stated.  Current status: Pt returning to bed from up in converter chair, able to assist with rolling when provided verbal and tactile cues to initiate, Max A to roll. Participated in AAROM at B UEs and LEs, stronger in the arms than legs. Significant proximal weakness. No EOB dangle as pt falling asleep with need for frequent stimulus to participate.   Barriers to return to prior living situation: Fatigue, assist level, respiratory status  Recommendations for discharge: TCU  Rationale for recommendations: Pt is close to baseline mobility. Recommend TCU for continued improvement of functional ability and vitals as it sounds like family might not have the essential resources at home to properly care for pt. Will hopefully be able to return home with children in future with appropriate resources       Entered by: Amanda Soliz 05/27/2018 11:28 AM

## 2018-05-27 NOTE — PLAN OF CARE
Problem: Patient Care Overview  Goal: Plan of Care/Patient Progress Review  Outcome: No Change  Patient has had increase in secretions from trache, thick creamy, patient weaned today 15/5 @ 40% , up in chair for 4hrs, with chair tilt and weight shifted q2 hrs, prn metopolol 5mg given for rapid a-fib > 120 bpm, heart rate currently 94 with burst up to 120s, home care attendant at bedside, questions answered and plan of care reviewed

## 2018-05-27 NOTE — PLAN OF CARE
Problem: Patient Care Overview  Goal: Plan of Care/Patient Progress Review  Outcome: No Change  Pt very anxious throughout night, having to redirect often. Random bursts of rapid of A-fib particularly with coughing. Scheduled and PRN Metroprolol given. Pt having thick creamy secretions out of ETT with frequent coughing. Lungs diminished.  Sliding scale coverage given. Care giver updated bedside. Will continue plan of care.

## 2018-05-27 NOTE — PROGRESS NOTES
[facility]    Daily Progress Note  [facility]       Date of Admission:  5/21/2018    Assessment & Plan   Jacques Solis is a 88 year old female with past history A-fib, CHF, severe AS, DM II, asthma, chronic respiratory failure with vent dependence who was discharged home from [facility] on 5/18/18 but did not have appropriate home services arranged, but son also reporting a recent decline in respiratory status.    Acute on chronic hypoxic respiratory failure with vent dependence:  Chronic failure felt due to pleural effusions and overall debility.  Pulmonologist at [facility] felt she would chronically be vent dependent and weaning efforts were stopped.  CT chest in ED shows small to moderated loculated bilateral pleural effusions with atelectasis.  Also a 4 cm gil opacity in left upper lobe, but no SIRS criteria present. But daughter says a month ago she was off ventilator.   Ventilator status stable.    I placed on PS 15 and has -300.    - No acute issues, so will work to arrange discharge to home. Social work and care coordination involved    Paroxysmal A-fib:   NSR now  - continue PTA metoprolol (note son has been dosing this at 25 mg q8h)  - INR at therapeutic level; continue coumadin, PharmD to dose    Hx of PE  - coumadin as above    Severe AS  Chronic diastolic CHF  TTE 3/2018 with EF 55-60%, severe AS (area of 0.6-0.7) and mild regurgitation, moderate MR.  Is reportedly not a TAVR candidate.  - continue PTA metolazone and torsemide  - Check magnesium level  - Start schedule daily potassium    DM II:  Increase lanus 16 units daily with SSI but daughter requests oral DM medications only except for sliding scale.     Asthma:  Continue PTA nebs.    Recent acute blood loss anemia:  Reportedly due to supratherapeutic lovenox in setting of PAULY.  Hgb at recent discharge 8.4, now improved to 10 g/dL.    Possible encephalopathy  Discharge summary states probable encephalopathy  "due to critical illness superimposed on some dementia.  Son denies any history of dementia, patient following commands ok   - continue PTA Seroquel    Nonhealing right leg wound  Due to hematoma with necrotic eschar now s/p debridement  - WOC consult    Dysphagia  - continue PTA tube feeds    Mild abdominal distension  -possible adynamic ileus, but tolerating tube feeds  -simethicone prn    DVT Prophylaxis: Warfarin    # Pain Assessment:  Current Pain Score 5/27/2018   Patient currently in pain? denies   Pain score (0-10) -   Pain location -   Pain descriptors -   CPOT pain score 0   Jacques newell pain level was assessed and she currently denies pain.          Disposition: Expected discharge as soon as home care assistance is arranged.    Florentin Tracy    Primary Care Physician   Michael Mccarthy     24 hour events:  Tolerating weaning on ventilator.  Small to moderate amount of secretions.  Sleepy this morning.    Chief Complaint   Lack of home resources for management    History is obtained from the patient's son (who is an anesthesiologist) and chart review. Recent discharge summary from [facility] said the following \"87-year-old female well-known to the critical care service with a history of some mild memory impairment, but has been essentially hospital-dependent for the last 9 months with severe aortic stenosis, not a candidate for TAVR, chronic diastolic heart failure, chronic bilateral recurrent pleural effusions, chronic vent dependence, history of pulmonary embolism, atrial fibrillation, peripheral artery disease with history of critical limb ischemia, who was transferred to [facility] from a long-term care facility. She was transferred due to acute blood loss anemia, elevated leukocytosis and acute renal failure. She was recently started on tobramycin for pseudomonas growing in her sputum.\"      History of Present Illness   Jacques Solis is a 88 year old female who presents with inadequate home support " and possible acute on chronic hypoxic respiratory failure.  She has essentially been hospitalized since September 2017 when she was admitted to [facility] for critical limb ischemia and underwent balloon angioplasty which was complicated by femoral artery perforation with hemorrhagic shock.  She also had chronic respiratory and was unable to wean from the vent and was trached and pegged on 11/13/17.  She was also noted to have some encephalopathy.  Ultimately she was discharged to [facility]. She has essentially moved back and forth from [facility] and [facility] and then to [facility] and [facility] ever since.  Her most recent admission to [facility] 4/26-5/4 was due to acute blood loss anemia due to left thigh hematoma in addition to renal failure due to tobramycin toxicity for multi-drug resistant Pseudomonas.  She also underwent debridement of a right lower extremity wound due to an infected hematoma.  She was ultimately discharged to [facility], where she has stayed until discharge home on 5/18/18.  Pulmonology felt that she would not be able to wean and recommended discharge home or to TCU and son elected for discharge home.      He reports he was not set up with anyone who would be able to manage a home vent, reports not being given detailed sliding scale insulin instructions (no dosage parameters), was unable to obtain nebs due to lack of appropriate ICD code on discharge orders, did not have supplies at home to replace clogged tube feeding piece in addition to concerns about the competency of the home nurse.  Due to these issues he presented to [facility], unclear as to why he did not return to [facility], which is very familiar with the patient's case.  He reports his mother has otherwise been stable with the exception that she experienced several acute desaturation events over the past 2 days which resolved with suctioning.  He reports about 1 month ago she was able to tolerate being off the ventilator for about 16 hours/day and was  able to speak with passy wilner valve.    Son reports his goals are for his mother to be able to wean from the vent for at least a few hours and be able to speak with valve and possible eat some food by mouth.  He understands that she may never be able to fully wean from the vent and is accepting of this, however, he wants any reversible conditions treated.    Past Medical History    Paroxysmal atrial fibrillation  Chronic respiratory failure secondary to recurrent pleural effusions and deconditioning  Severe aortic stenosis  Chronic diastolic congestive heart failure  Diabetes mellitus type 2  Asthma   Dysphagia with feeding tube dependence  History of critical limb ischemia    Past Surgical History   D&C  Tracheostomy  PEG tube placement    Prior to Admission Medications   Prior to Admission Medications   Prescriptions Last Dose Informant Patient Reported? Taking?   Acetaminophen (TYLENOL PO)  at PRN Care Giver Yes Yes   Sig: Take 650 mg by mouth every 8 hours as needed for mild pain   Emollient (HYDROPHOR) OINT  at PRN Care Giver Yes Yes   Sig: Externally apply topically daily as needed   MELATONIN PO 2018 at HS Care Giver Yes Yes   Sig: Take 6 mg by mouth At Bedtime   METOPROLOL TARTRATE PO 2018 at NOON Care Giver Yes Yes   Si mg by Oral or G tube route every 8 hours    MetOLazone (ZAROXOLYN PO)  at PRN Care Giver Yes Yes   Sig: Take 2.5 mg by mouth daily as needed for other (BODY WEIGHT OVER 192 LBS)   Mirtazapine (REMERON PO) 2018 at HS Care Giver Yes Yes   Sig: 15 mg by Oral or G tube route At Bedtime   QUEtiapine Fumarate (SEROQUEL PO)  at PRN Care Giver Yes Yes   Sig: Take 6.25 mg by mouth every 8 hours as needed (AGITATION.)   SIMETHICONE-80 PO  at PRN Care Giver Yes Yes   Sig: Take 80 mg by mouth 4 times daily as needed for intestinal gas or other (FLATULENCE)   Torsemide (DEMADEX PO) 2018 at PM Care Giver Yes Yes   Sig: 10 mg by Oral or G tube route 2 times daily (before meals)    WARFARIN SODIUM PO 2018 at PM Care Giver Yes Yes   Sig: Take 4 mg by mouth daily   bacitracin 500 UNIT/GM OINT 2018 at AM Care Giver Yes Yes   Sig: Apply topically 2 times daily   bisacodyl (DULCOLAX) 10 MG Suppository  at PRN Care Giver Yes Yes   Sig: Place 10 mg rectally daily as needed for constipation   chlorhexidine (PERIDEX) 0.12 % solution 2018 at AM Care Giver Yes Yes   Sig: Take 15 mLs by mouth 2 times daily   fluticasone (FLONASE) 50 MCG/ACT spray 2018 at AM Care Giver Yes Yes   Sig: Spray 1 spray into both nostrils daily   insulin glargine (LANTUS SOLOSTAR) 100 UNIT/ML pen 2018 at HS Care Giver Yes Yes   Sig: Inject 12 Units Subcutaneous At Bedtime   insulin lispro (HUMALOG KWIKPEN) 100 UNIT/ML injection 2018 at NOON Care Giver Yes Yes   Sig: Inject 0.12 Units Subcutaneous every 6 hours NO SLIDING SCALE WAS GIVEN. PREVIOUS SS, WHILE AT [facility], WENT UP TO 16 UNITS.   ipratropium - albuterol 0.5 mg/2.5 mg/3 mL (DUONEB) 0.5-2.5 (3) MG/3ML neb solution  at PRN Care Giver Yes Yes   Sig: Take 1 vial by nebulization 2 times daily as needed for wheezing   lactobacillus TABS 2018 at AM Care Giver Yes Yes   Si tablets by Oral or G tube route 2 times daily   sennosides (SENOKOT) 8.8 MG/5ML syrup  at PRN Care Giver Yes Yes   Sig: Take 5 mLs by mouth daily as needed for constipation      Facility-Administered Medications: None     Allergies   Allergies   Allergen Reactions     Amikacin      Amiodarone      Codeine      Dexmedetomidine      Lisinopril      Penicillins      Sulfa Drugs            Physical Exam   Temp: 99.1  F (37.3  C) Temp src: Bladder BP: 104/72   Heart Rate: 102 Resp: 20 SpO2: 100 % O2 Device: Mechanical Ventilator    Vital Signs with Ranges  Temp:  [98.4  F (36.9  C)-99.5  F (37.5  C)] 99.1  F (37.3  C)  Heart Rate:  [] 102  Resp:  [0-54] 20  BP: ()/() 104/72  FiO2 (%):  [40 %] 40 %  SpO2:  [96 %-100 %] 100 %  184 lbs 1.35  oz    Constitutional:  well nourished female,awake and alert.  Mouths words but difficult to understand.   Eyes: pupils equal, round and reactive to light,  HEENT: , trach in place, Bivona   Respiratory: lungs clear to auscultation bilaterally,, no tachypnea  Cardiovascular: RR rhythm, normal S1/S2, no murmur, rubs or gallops  GI: abdomen soft, non-tender, somewhat distended, normal bowel sounds, no hepatosplenomegaly, PEG tube in place, belching frequently  Lymph/Hematologic: 1+ thigh edema  Skin: dressing over skin wound right thigh.   Musculoskeletal: Extremities warm and well perfused      I  Data   Data reviewed today:  I personally reviewed no images or EKG's today.    Recent Labs  Lab 05/27/18 0445 05/26/18 2025 05/26/18 0400 05/25/18 0455 05/23/18 0443   WBC 11.1*  --  8.2 10.3  < > 9.6   HGB 9.5*  --  9.1* 10.8*  < > 9.5*   MCV 91  --  90 90  < > 89     --  246 347  < > 284   INR 2.49*  --  2.34* 2.49*  < > 3.11*     --  141 140  < > 139   POTASSIUM 4.2 4.1 3.3* 3.4  < > 3.6   CHLORIDE 99  --  99 97  < > 96   CO2 37*  --  38* 36*  < > 34*   BUN 61*  --  60* 65*  < > 64*   CR 1.11*  --  1.03 1.15*  < > 1.20*   ANIONGAP 4  --  4 7  < > 9   KRISTOFER 8.6  --  8.6 8.8  < > 8.6   *  --  140* 148*  < > 170*   ALBUMIN  --   --   --   --   --  2.3*   PROTTOTAL  --   --   --   --   --  6.4*   BILITOTAL  --   --   --   --   --  0.5   ALKPHOS  --   --   --   --   --  224*   ALT  --   --   --   --   --  47   AST  --   --   --   --   --  49*   < > = values in this interval not displayed.    No results found for this or any previous visit (from the past 24 hour(s)).

## 2018-05-27 NOTE — PLAN OF CARE
Problem: Patient Care Overview  Goal: Plan of Care/Patient Progress Review  Outcome: No Change  Patient weaned 15/5 @ 40% for 3 hours , than 12/5 @ 30% from 1100 am to current , ETV s 270-290, heart controlled atrial fib , rate increases to 120 with coughing, productive cough, yellow sputum.

## 2018-05-27 NOTE — PROGRESS NOTES
[facility] ICU RESPIRATORY NOTE  Date of Admission: 5/21/18  Date of Intubation (most recent): 5/21/18  Reason for Mechanical Ventilation: Acute on chronic hypoxic respiratory failure with chronic vent dependency.  Met Criteria for Pressure Support Trial: Yes   Length of Pressure Support Trial: PS 12/5 for 5 hours.  Reason for Stopping Pressure Support; Pt developed rapid A fib.  Number of Days on Mechanical Ventilation: 7  Significant Events Today: PS performed today       ABG Results: none today      Plan: Pt is on weaning trial for 5 hours today..      RT Jaquan.

## 2018-05-28 ENCOUNTER — APPOINTMENT (OUTPATIENT)
Dept: GENERAL RADIOLOGY | Facility: CLINIC | Age: 83
DRG: 207 | End: 2018-05-28
Attending: INTERNAL MEDICINE
Payer: MEDICARE

## 2018-05-28 LAB
ANION GAP SERPL CALCULATED.3IONS-SCNC: 7 MMOL/L (ref 3–14)
BUN SERPL-MCNC: 60 MG/DL (ref 7–30)
CALCIUM SERPL-MCNC: 8.6 MG/DL (ref 8.5–10.1)
CHLORIDE SERPL-SCNC: 98 MMOL/L (ref 94–109)
CO2 SERPL-SCNC: 34 MMOL/L (ref 20–32)
CREAT SERPL-MCNC: 1.16 MG/DL (ref 0.52–1.04)
ERYTHROCYTE [DISTWIDTH] IN BLOOD BY AUTOMATED COUNT: 18.3 % (ref 10–15)
GFR SERPL CREATININE-BSD FRML MDRD: 44 ML/MIN/1.7M2
GLUCOSE BLDC GLUCOMTR-MCNC: 144 MG/DL (ref 70–99)
GLUCOSE BLDC GLUCOMTR-MCNC: 155 MG/DL (ref 70–99)
GLUCOSE BLDC GLUCOMTR-MCNC: 169 MG/DL (ref 70–99)
GLUCOSE BLDC GLUCOMTR-MCNC: 177 MG/DL (ref 70–99)
GLUCOSE BLDC GLUCOMTR-MCNC: 203 MG/DL (ref 70–99)
GLUCOSE BLDC GLUCOMTR-MCNC: 204 MG/DL (ref 70–99)
GLUCOSE BLDC GLUCOMTR-MCNC: 230 MG/DL (ref 70–99)
GLUCOSE SERPL-MCNC: 149 MG/DL (ref 70–99)
HCT VFR BLD AUTO: 29.4 % (ref 35–47)
HGB BLD-MCNC: 9 G/DL (ref 11.7–15.7)
INR PPP: 3.01 (ref 0.86–1.14)
MCH RBC QN AUTO: 27.4 PG (ref 26.5–33)
MCHC RBC AUTO-ENTMCNC: 30.6 G/DL (ref 31.5–36.5)
MCV RBC AUTO: 90 FL (ref 78–100)
PLATELET # BLD AUTO: 239 10E9/L (ref 150–450)
POTASSIUM SERPL-SCNC: 4.2 MMOL/L (ref 3.4–5.3)
RBC # BLD AUTO: 3.28 10E12/L (ref 3.8–5.2)
SODIUM SERPL-SCNC: 139 MMOL/L (ref 133–144)
WBC # BLD AUTO: 8.7 10E9/L (ref 4–11)

## 2018-05-28 PROCEDURE — 71045 X-RAY EXAM CHEST 1 VIEW: CPT

## 2018-05-28 PROCEDURE — 25000132 ZZH RX MED GY IP 250 OP 250 PS 637: Mod: GY | Performed by: HOSPITALIST

## 2018-05-28 PROCEDURE — 25000132 ZZH RX MED GY IP 250 OP 250 PS 637: Mod: GY | Performed by: INTERNAL MEDICINE

## 2018-05-28 PROCEDURE — A9270 NON-COVERED ITEM OR SERVICE: HCPCS | Mod: GY | Performed by: HOSPITALIST

## 2018-05-28 PROCEDURE — 85027 COMPLETE CBC AUTOMATED: CPT | Performed by: HOSPITALIST

## 2018-05-28 PROCEDURE — 40000008 ZZH STATISTIC AIRWAY CARE

## 2018-05-28 PROCEDURE — 80048 BASIC METABOLIC PNL TOTAL CA: CPT | Performed by: HOSPITALIST

## 2018-05-28 PROCEDURE — 40000275 ZZH STATISTIC RCP TIME EA 10 MIN

## 2018-05-28 PROCEDURE — 40000239 ZZH STATISTIC VAT ROUNDS

## 2018-05-28 PROCEDURE — 25000128 H RX IP 250 OP 636: Performed by: INTERNAL MEDICINE

## 2018-05-28 PROCEDURE — 00000146 ZZHCL STATISTIC GLUCOSE BY METER IP

## 2018-05-28 PROCEDURE — 85610 PROTHROMBIN TIME: CPT | Performed by: HOSPITALIST

## 2018-05-28 PROCEDURE — 25000131 ZZH RX MED GY IP 250 OP 636 PS 637: Mod: GY | Performed by: INTERNAL MEDICINE

## 2018-05-28 PROCEDURE — 27210432 ZZH NUTRITION PRODUCT RENAL BASIC LITER

## 2018-05-28 PROCEDURE — 94003 VENT MGMT INPAT SUBQ DAY: CPT

## 2018-05-28 PROCEDURE — 20000003 ZZH R&B ICU

## 2018-05-28 PROCEDURE — A9270 NON-COVERED ITEM OR SERVICE: HCPCS | Mod: GY | Performed by: INTERNAL MEDICINE

## 2018-05-28 RX ORDER — WARFARIN SODIUM 3 MG/1
3 TABLET ORAL
Status: COMPLETED | OUTPATIENT
Start: 2018-05-28 | End: 2018-05-28

## 2018-05-28 RX ORDER — HYDROMORPHONE HYDROCHLORIDE 1 MG/ML
0.2 INJECTION, SOLUTION INTRAMUSCULAR; INTRAVENOUS; SUBCUTANEOUS
Status: DISCONTINUED | OUTPATIENT
Start: 2018-05-28 | End: 2018-06-08

## 2018-05-28 RX ADMIN — METOPROLOL TARTRATE 50 MG: 50 TABLET, FILM COATED ORAL at 00:32

## 2018-05-28 RX ADMIN — INSULIN ASPART 1 UNITS: 100 INJECTION, SOLUTION INTRAVENOUS; SUBCUTANEOUS at 00:25

## 2018-05-28 RX ADMIN — WARFARIN SODIUM 3 MG: 3 TABLET ORAL at 18:56

## 2018-05-28 RX ADMIN — INSULIN ASPART 1 UNITS: 100 INJECTION, SOLUTION INTRAVENOUS; SUBCUTANEOUS at 07:58

## 2018-05-28 RX ADMIN — METOPROLOL TARTRATE 50 MG: 50 TABLET, FILM COATED ORAL at 16:48

## 2018-05-28 RX ADMIN — INSULIN ASPART 2 UNITS: 100 INJECTION, SOLUTION INTRAVENOUS; SUBCUTANEOUS at 11:38

## 2018-05-28 RX ADMIN — METOPROLOL TARTRATE 50 MG: 50 TABLET, FILM COATED ORAL at 08:00

## 2018-05-28 RX ADMIN — POTASSIUM CHLORIDE 20 MEQ: 20 SOLUTION ORAL at 08:02

## 2018-05-28 RX ADMIN — INSULIN ASPART 1 UNITS: 100 INJECTION, SOLUTION INTRAVENOUS; SUBCUTANEOUS at 17:00

## 2018-05-28 RX ADMIN — INSULIN ASPART 2 UNITS: 100 INJECTION, SOLUTION INTRAVENOUS; SUBCUTANEOUS at 21:08

## 2018-05-28 RX ADMIN — Medication 1 CAPSULE: at 21:09

## 2018-05-28 RX ADMIN — ACETAMINOPHEN 650 MG: 160 SUSPENSION ORAL at 09:58

## 2018-05-28 RX ADMIN — Medication 6.25 MG: at 14:09

## 2018-05-28 RX ADMIN — HYDROMORPHONE HYDROCHLORIDE 0.2 MG: 1 INJECTION, SOLUTION INTRAMUSCULAR; INTRAVENOUS; SUBCUTANEOUS at 07:02

## 2018-05-28 RX ADMIN — TORSEMIDE 10 MG: 10 TABLET ORAL at 07:58

## 2018-05-28 RX ADMIN — Medication 1 CAPSULE: at 08:02

## 2018-05-28 RX ADMIN — INSULIN ASPART 1 UNITS: 100 INJECTION, SOLUTION INTRAVENOUS; SUBCUTANEOUS at 04:30

## 2018-05-28 RX ADMIN — INSULIN GLARGINE 16 UNITS: 100 INJECTION, SOLUTION SUBCUTANEOUS at 23:48

## 2018-05-28 RX ADMIN — TORSEMIDE 10 MG: 10 TABLET ORAL at 16:48

## 2018-05-28 RX ADMIN — MIRTAZAPINE 15 MG: 7.5 TABLET ORAL at 21:09

## 2018-05-28 RX ADMIN — MELATONIN TAB 3 MG 6 MG: 3 TAB at 21:09

## 2018-05-28 RX ADMIN — HYDROMORPHONE HYDROCHLORIDE 0.2 MG: 1 INJECTION, SOLUTION INTRAMUSCULAR; INTRAVENOUS; SUBCUTANEOUS at 14:02

## 2018-05-28 RX ADMIN — METOPROLOL TARTRATE 50 MG: 50 TABLET, FILM COATED ORAL at 23:34

## 2018-05-28 RX ADMIN — INSULIN ASPART 2 UNITS: 100 INJECTION, SOLUTION INTRAVENOUS; SUBCUTANEOUS at 23:34

## 2018-05-28 NOTE — PROGRESS NOTES
[facility]    Daily Progress Note  [facility]       Date of Admission:  5/21/2018    Assessment & Plan   Jacques Solis is a 88 year old female with past history A-fib, CHF, severe AS, DM II, asthma, chronic respiratory failure with vent dependence who was discharged home from [facility] on 5/18/18 but did not have appropriate home services arranged, but son also reporting a recent decline in respiratory status.    Acute on chronic hypoxic respiratory failure with vent dependence:  Chronic failure felt due to pleural effusions and overall debility.  Pulmonologist at [facility] felt she would chronically be vent dependent and weaning efforts were stopped.  CT chest in ED shows small to moderated loculated bilateral pleural effusions with atelectasis.  Also a 4 cm gil opacity in left upper lobe, but no SIRS criteria present.  Tolerating moderate levels of PS wean for longer periods of time, will continue.    Ventilator status stable.  Tolerating PS 12 and has -270.    - No acute issues, so will work to arrange discharge to home. Social work and care coordination involved    Paroxysmal A-fib:   NSR now  - continue PTA metoprolol (note son has been dosing this at 25 mg q8h)  - INR at therapeutic level; continue coumadin, PharmD to dose    Hx of PE  - coumadin as above    Severe AS  Chronic diastolic CHF  TTE 3/2018 with EF 55-60%, severe AS (area of 0.6-0.7) and mild regurgitation, moderate MR.  Is reportedly not a TAVR candidate.  Potassium has remained normal on daily replacement.  - continue PTA metolazone and torsemide  - Continue scheduled daily potassium    DM II:  Continue lanus 16 units daily with SSI but daughter requests oral DM medications only except for sliding scale.     Asthma:  Continue PTA nebs.    Recent acute blood loss anemia:  Reportedly due to supratherapeutic lovenox in setting of PAULY.  Hgb at recent discharge 8.4, now improved to 9-10.    Possible  "encephalopathy  Discharge summary states probable encephalopathy due to critical illness superimposed on some dementia.  Son denies any history of dementia, patient following commands ok   - continue PTA Seroquel    Nonhealing right leg wound  Due to hematoma with necrotic eschar now s/p debridement  - WOC consult    Renal-  Morris discontinued per family's request.  They understand risk of skin breakdown but have concerns regarding recurrent UTI's.    Dysphagia  - continue PTA tube feeds    Mild abdominal distension  -possible adynamic ileus, but tolerating tube feeds  -simethicone prn    DVT Prophylaxis: Warfarin    # Pain Assessment:  Current Pain Score 5/28/2018   Patient currently in pain? -   Pain score (0-10) -   Pain location -   Pain descriptors -   CPOT pain score 1   Jacques newell pain level was assessed and she currently denies pain.        Disposition: Expected discharge as soon as home care assistance is arranged.    Florentin Tracy    Primary Care Physician   Michael Mccarthy     24 hour events:  Tolerating weaning on ventilator, PS 12 yesterday for 7 hours.  Small to moderate amount of secretions.  Awake and alert this morning.    Chief Complaint   Lack of home resources for management    History is obtained from the patient's son (who is an anesthesiologist) and chart review. Recent discharge summary from [facility] said the following \"87-year-old female well-known to the critical care service with a history of some mild memory impairment, but has been essentially hospital-dependent for the last 9 months with severe aortic stenosis, not a candidate for TAVR, chronic diastolic heart failure, chronic bilateral recurrent pleural effusions, chronic vent dependence, history of pulmonary embolism, atrial fibrillation, peripheral artery disease with history of critical limb ischemia, who was transferred to [facility] from a long-term care facility. She was transferred due to acute blood loss anemia, elevated " "leukocytosis and acute renal failure. She was recently started on tobramycin for pseudomonas growing in her sputum.\"      History of Present Illness   Jacques Solis is a 88 year old female who presents with inadequate home support and possible acute on chronic hypoxic respiratory failure.  She has essentially been hospitalized since September 2017 when she was admitted to [facility] for critical limb ischemia and underwent balloon angioplasty which was complicated by femoral artery perforation with hemorrhagic shock.  She also had chronic respiratory and was unable to wean from the vent and was trached and pegged on 11/13/17.  She was also noted to have some encephalopathy.  Ultimately she was discharged to [facility]. She has essentially moved back and forth from [facility] and [facility] and then to [facility] and [facility] ever since.  Her most recent admission to [facility] 4/26-5/4 was due to acute blood loss anemia due to left thigh hematoma in addition to renal failure due to tobramycin toxicity for multi-drug resistant Pseudomonas.  She also underwent debridement of a right lower extremity wound due to an infected hematoma.  She was ultimately discharged to [facility], where she has stayed until discharge home on 5/18/18.  Pulmonology felt that she would not be able to wean and recommended discharge home or to TCU and son elected for discharge home.      He reports he was not set up with anyone who would be able to manage a home vent, reports not being given detailed sliding scale insulin instructions (no dosage parameters), was unable to obtain nebs due to lack of appropriate ICD code on discharge orders, did not have supplies at home to replace clogged tube feeding piece in addition to concerns about the competency of the home nurse.  Due to these issues he presented to [facility], unclear as to why he did not return to [facility], which is very familiar with the patient's case.  He reports his mother has otherwise been stable with the " exception that she experienced several acute desaturation events over the past 2 days which resolved with suctioning.  He reports about 1 month ago she was able to tolerate being off the ventilator for about 16 hours/day and was able to speak with passy wilner valve.    Son reports his goals are for his mother to be able to wean from the vent for at least a few hours and be able to speak with valve and possible eat some food by mouth.  He understands that she may never be able to fully wean from the vent and is accepting of this, however, he wants any reversible conditions treated.    Past Medical History    Paroxysmal atrial fibrillation  Chronic respiratory failure secondary to recurrent pleural effusions and deconditioning  Severe aortic stenosis  Chronic diastolic congestive heart failure  Diabetes mellitus type 2  Asthma   Dysphagia with feeding tube dependence  History of critical limb ischemia    Past Surgical History   D&C  Tracheostomy  PEG tube placement    Prior to Admission Medications   Prior to Admission Medications   Prescriptions Last Dose Informant Patient Reported? Taking?   Acetaminophen (TYLENOL PO)  at PRN Care Giver Yes Yes   Sig: Take 650 mg by mouth every 8 hours as needed for mild pain   Emollient (HYDROPHOR) OINT  at PRN Care Giver Yes Yes   Sig: Externally apply topically daily as needed   MELATONIN PO 2018 at HS Care Giver Yes Yes   Sig: Take 6 mg by mouth At Bedtime   METOPROLOL TARTRATE PO 2018 at NOON Care Giver Yes Yes   Si mg by Oral or G tube route every 8 hours    MetOLazone (ZAROXOLYN PO)  at PRN Care Giver Yes Yes   Sig: Take 2.5 mg by mouth daily as needed for other (BODY WEIGHT OVER 192 LBS)   Mirtazapine (REMERON PO) 2018 at HS Care Giver Yes Yes   Sig: 15 mg by Oral or G tube route At Bedtime   QUEtiapine Fumarate (SEROQUEL PO)  at PRN Care Giver Yes Yes   Sig: Take 6.25 mg by mouth every 8 hours as needed (AGITATION.)   SIMETHICONE-80 PO  at PRN Care Giver  Yes Yes   Sig: Take 80 mg by mouth 4 times daily as needed for intestinal gas or other (FLATULENCE)   Torsemide (DEMADEX PO) 2018 at PM Care Giver Yes Yes   Sig: 10 mg by Oral or G tube route 2 times daily (before meals)   WARFARIN SODIUM PO 2018 at PM Care Giver Yes Yes   Sig: Take 4 mg by mouth daily   bacitracin 500 UNIT/GM OINT 2018 at AM Care Giver Yes Yes   Sig: Apply topically 2 times daily   bisacodyl (DULCOLAX) 10 MG Suppository  at PRN Care Giver Yes Yes   Sig: Place 10 mg rectally daily as needed for constipation   chlorhexidine (PERIDEX) 0.12 % solution 2018 at AM Care Giver Yes Yes   Sig: Take 15 mLs by mouth 2 times daily   fluticasone (FLONASE) 50 MCG/ACT spray 2018 at AM Care Giver Yes Yes   Sig: Spray 1 spray into both nostrils daily   insulin glargine (LANTUS SOLOSTAR) 100 UNIT/ML pen 2018 at HS Care Giver Yes Yes   Sig: Inject 12 Units Subcutaneous At Bedtime   insulin lispro (HUMALOG KWIKPEN) 100 UNIT/ML injection 2018 at NOON Care Giver Yes Yes   Sig: Inject 0.12 Units Subcutaneous every 6 hours NO SLIDING SCALE WAS GIVEN. PREVIOUS SS, WHILE AT [facility], WENT UP TO 16 UNITS.   ipratropium - albuterol 0.5 mg/2.5 mg/3 mL (DUONEB) 0.5-2.5 (3) MG/3ML neb solution  at PRN Care Giver Yes Yes   Sig: Take 1 vial by nebulization 2 times daily as needed for wheezing   lactobacillus TABS 2018 at AM Care Giver Yes Yes   Si tablets by Oral or G tube route 2 times daily   sennosides (SENOKOT) 8.8 MG/5ML syrup  at PRN Care Giver Yes Yes   Sig: Take 5 mLs by mouth daily as needed for constipation      Facility-Administered Medications: None     Allergies   Allergies   Allergen Reactions     Amikacin      Amiodarone      Codeine      Dexmedetomidine      Lisinopril      Penicillins      Sulfa Drugs            Physical Exam   Temp: 99  F (37.2  C) Temp src: Bladder BP: 113/85   Heart Rate: 96 Resp: 16 SpO2: 97 % O2 Device: Mechanical Ventilator    Vital Signs with  Ranges  Temp:  [98.6  F (37  C)-99.1  F (37.3  C)] 99  F (37.2  C)  Heart Rate:  [] 96  Resp:  [10-28] 16  BP: (105-132)/(47-90) 113/85  FiO2 (%):  [30 %-40 %] 30 %  SpO2:  [96 %-100 %] 97 %  184 lbs 1.35 oz    Constitutional:  well nourished female,awake and alert.  Mouths words but difficult to understand.   Eyes: pupils equal, round and reactive to light,  HEENT: , trach in place, Bivona   Respiratory: lungs clear to auscultation bilaterally,, no tachypnea  Cardiovascular: RR rhythm, normal S1/S2, no murmur, rubs or gallops  GI: abdomen soft, non-tender, somewhat distended, normal bowel sounds, no hepatosplenomegaly, PEG tube in place, belching frequently  Lymph/Hematologic: 1+ thigh edema  Skin: dressing over skin wound right thigh.   Musculoskeletal: Extremities warm and well perfused      I  Data   Data reviewed today:  I personally reviewed no images or EKG's today.    Recent Labs  Lab 05/28/18  0442 05/27/18  0445 05/26/18 2025 05/26/18  0400  05/23/18  0443   WBC 8.7 11.1*  --  8.2  < > 9.6   HGB 9.0* 9.5*  --  9.1*  < > 9.5*   MCV 90 91  --  90  < > 89    258  --  246  < > 284   INR 3.01* 2.49*  --  2.34*  < > 3.11*    140  --  141  < > 139   POTASSIUM 4.2 4.2 4.1 3.3*  < > 3.6   CHLORIDE 98 99  --  99  < > 96   CO2 34* 37*  --  38*  < > 34*   BUN 60* 61*  --  60*  < > 64*   CR 1.16* 1.11*  --  1.03  < > 1.20*   ANIONGAP 7 4  --  4  < > 9   KRISTOFER 8.6 8.6  --  8.6  < > 8.6   * 204*  --  140*  < > 170*   ALBUMIN  --   --   --   --   --  2.3*   PROTTOTAL  --   --   --   --   --  6.4*   BILITOTAL  --   --   --   --   --  0.5   ALKPHOS  --   --   --   --   --  224*   ALT  --   --   --   --   --  47   AST  --   --   --   --   --  49*   < > = values in this interval not displayed.    No results found for this or any previous visit (from the past 24 hour(s)).

## 2018-05-28 NOTE — PROVIDER NOTIFICATION
Pt had episode of HR dropping to 39 and O2 sats to 55%, ambu bag used and suctioned mucous plug out, O2 sats up to 100%, diminished lung sounds noted to the right upper lobe and O2 sats decreased back down to 82%. Pt bag suctioned again with nursing, RT, and Dr. Tracy present. CXR ordered and evaluated by Dr. Tracy, continue to monitor at this time. Pt's son updated regarding event on the phone.

## 2018-05-28 NOTE — PLAN OF CARE
Problem: Patient Care Overview  Goal: Plan of Care/Patient Progress Review  Outcome: No Change  Neuro: alert, at times restless, prn seraquel given.   CV: afib with CVR  Pulm: PS 15/5 for 1 hour and 12/5 now. Plan to keep PST for 8-10 hours ( 7 hours PST yesterday) creamy secretions from trach, LS coarse-clear-dim  GI/: TF at goal, BM x1 today. Morris dcd, pt incontinent of urine.  Skin: unchanged, RLE dsg CDI, Left hand dsg CDI  Lines: double lumen PICC NICHO  Restraints: none    Pt was up in the chair for 2 hours. Personal Caregiver- bennie at the bedside.    Plan: Rest overnight on vent. UIC, PST,  and care coordinator working on placement- home health services. Speech to see pt tomorrow for PSV trial. PT/OT following pt

## 2018-05-28 NOTE — PLAN OF CARE
Problem: Patient Care Overview  Goal: Plan of Care/Patient Progress Review  Outcome: No Change  PT very restless overnight. Coughing on ventilator frequently. Thick, creamy secretions out of ETT. Suctioned often. Lungs coarse to clear. PRN Dilaudid given for pain. A-fib RVR, scheduled metoprolol given. UOP adequate. Wound on right leg redressed and assessed. Plan for weaning today and plan of care to be discussed.

## 2018-05-29 ENCOUNTER — APPOINTMENT (OUTPATIENT)
Dept: PHYSICAL THERAPY | Facility: CLINIC | Age: 83
DRG: 207 | End: 2018-05-29
Payer: MEDICARE

## 2018-05-29 LAB
ALBUMIN UR-MCNC: 30 MG/DL
ANION GAP SERPL CALCULATED.3IONS-SCNC: 7 MMOL/L (ref 3–14)
APPEARANCE UR: ABNORMAL
BILIRUB UR QL STRIP: NEGATIVE
BUN SERPL-MCNC: 67 MG/DL (ref 7–30)
CALCIUM SERPL-MCNC: 8.9 MG/DL (ref 8.5–10.1)
CHLORIDE SERPL-SCNC: 96 MMOL/L (ref 94–109)
CO2 SERPL-SCNC: 34 MMOL/L (ref 20–32)
COLOR UR AUTO: YELLOW
CREAT SERPL-MCNC: 1.3 MG/DL (ref 0.52–1.04)
ERYTHROCYTE [DISTWIDTH] IN BLOOD BY AUTOMATED COUNT: 18.1 % (ref 10–15)
GFR SERPL CREATININE-BSD FRML MDRD: 39 ML/MIN/1.7M2
GLUCOSE BLDC GLUCOMTR-MCNC: 173 MG/DL (ref 70–99)
GLUCOSE BLDC GLUCOMTR-MCNC: 186 MG/DL (ref 70–99)
GLUCOSE BLDC GLUCOMTR-MCNC: 194 MG/DL (ref 70–99)
GLUCOSE BLDC GLUCOMTR-MCNC: 210 MG/DL (ref 70–99)
GLUCOSE BLDC GLUCOMTR-MCNC: 212 MG/DL (ref 70–99)
GLUCOSE SERPL-MCNC: 192 MG/DL (ref 70–99)
GLUCOSE UR STRIP-MCNC: NEGATIVE MG/DL
HCT VFR BLD AUTO: 34.7 % (ref 35–47)
HGB BLD-MCNC: 10.6 G/DL (ref 11.7–15.7)
HGB UR QL STRIP: ABNORMAL
HYALINE CASTS #/AREA URNS LPF: 10 /LPF (ref 0–2)
INR PPP: 3.43 (ref 0.86–1.14)
INTERPRETATION ECG - MUSE: NORMAL
KETONES UR STRIP-MCNC: NEGATIVE MG/DL
LEUKOCYTE ESTERASE UR QL STRIP: ABNORMAL
MCH RBC QN AUTO: 27.3 PG (ref 26.5–33)
MCHC RBC AUTO-ENTMCNC: 30.5 G/DL (ref 31.5–36.5)
MCV RBC AUTO: 89 FL (ref 78–100)
MUCOUS THREADS #/AREA URNS LPF: PRESENT /LPF
NITRATE UR QL: NEGATIVE
PH UR STRIP: 5.5 PH (ref 5–7)
PHOSPHATE SERPL-MCNC: 4 MG/DL (ref 2.5–4.5)
PLATELET # BLD AUTO: 298 10E9/L (ref 150–450)
POTASSIUM SERPL-SCNC: 4.8 MMOL/L (ref 3.4–5.3)
RBC # BLD AUTO: 3.88 10E12/L (ref 3.8–5.2)
RBC #/AREA URNS AUTO: 2 /HPF (ref 0–2)
SODIUM SERPL-SCNC: 137 MMOL/L (ref 133–144)
SOURCE: ABNORMAL
SP GR UR STRIP: 1.01 (ref 1–1.03)
SQUAMOUS #/AREA URNS AUTO: 2 /HPF (ref 0–1)
UROBILINOGEN UR STRIP-MCNC: NORMAL MG/DL (ref 0–2)
WBC # BLD AUTO: 15.1 10E9/L (ref 4–11)
WBC #/AREA URNS AUTO: 15 /HPF (ref 0–5)
WBC CLUMPS #/AREA URNS HPF: PRESENT /HPF

## 2018-05-29 PROCEDURE — 94640 AIRWAY INHALATION TREATMENT: CPT | Mod: 76

## 2018-05-29 PROCEDURE — 85610 PROTHROMBIN TIME: CPT | Performed by: HOSPITALIST

## 2018-05-29 PROCEDURE — 40000239 ZZH STATISTIC VAT ROUNDS

## 2018-05-29 PROCEDURE — 97110 THERAPEUTIC EXERCISES: CPT | Mod: GP | Performed by: PHYSICAL THERAPIST

## 2018-05-29 PROCEDURE — A9270 NON-COVERED ITEM OR SERVICE: HCPCS | Mod: GY | Performed by: INTERNAL MEDICINE

## 2018-05-29 PROCEDURE — 99291 CRITICAL CARE FIRST HOUR: CPT | Performed by: INTERNAL MEDICINE

## 2018-05-29 PROCEDURE — A9270 NON-COVERED ITEM OR SERVICE: HCPCS | Mod: GY | Performed by: HOSPITALIST

## 2018-05-29 PROCEDURE — 20000003 ZZH R&B ICU

## 2018-05-29 PROCEDURE — 25000131 ZZH RX MED GY IP 250 OP 636 PS 637: Mod: GY | Performed by: NURSE PRACTITIONER

## 2018-05-29 PROCEDURE — 85027 COMPLETE CBC AUTOMATED: CPT | Performed by: HOSPITALIST

## 2018-05-29 PROCEDURE — 40000193 ZZH STATISTIC PT WARD VISIT: Performed by: PHYSICAL THERAPIST

## 2018-05-29 PROCEDURE — 81001 URINALYSIS AUTO W/SCOPE: CPT | Performed by: INTERNAL MEDICINE

## 2018-05-29 PROCEDURE — 25000132 ZZH RX MED GY IP 250 OP 250 PS 637: Mod: GY | Performed by: HOSPITALIST

## 2018-05-29 PROCEDURE — 94003 VENT MGMT INPAT SUBQ DAY: CPT

## 2018-05-29 PROCEDURE — 40000008 ZZH STATISTIC AIRWAY CARE

## 2018-05-29 PROCEDURE — 25000125 ZZHC RX 250: Performed by: INTERNAL MEDICINE

## 2018-05-29 PROCEDURE — 84100 ASSAY OF PHOSPHORUS: CPT | Performed by: HOSPITALIST

## 2018-05-29 PROCEDURE — 87086 URINE CULTURE/COLONY COUNT: CPT | Performed by: NURSE PRACTITIONER

## 2018-05-29 PROCEDURE — G0463 HOSPITAL OUTPT CLINIC VISIT: HCPCS

## 2018-05-29 PROCEDURE — 94640 AIRWAY INHALATION TREATMENT: CPT

## 2018-05-29 PROCEDURE — 25000128 H RX IP 250 OP 636

## 2018-05-29 PROCEDURE — 25000128 H RX IP 250 OP 636: Performed by: INTERNAL MEDICINE

## 2018-05-29 PROCEDURE — 00000146 ZZHCL STATISTIC GLUCOSE BY METER IP

## 2018-05-29 PROCEDURE — 80048 BASIC METABOLIC PNL TOTAL CA: CPT | Performed by: HOSPITALIST

## 2018-05-29 PROCEDURE — 25000125 ZZHC RX 250

## 2018-05-29 PROCEDURE — 25000132 ZZH RX MED GY IP 250 OP 250 PS 637: Mod: GY | Performed by: INTERNAL MEDICINE

## 2018-05-29 PROCEDURE — 40000275 ZZH STATISTIC RCP TIME EA 10 MIN

## 2018-05-29 RX ORDER — IPRATROPIUM BROMIDE AND ALBUTEROL SULFATE 2.5; .5 MG/3ML; MG/3ML
1 SOLUTION RESPIRATORY (INHALATION) 2 TIMES DAILY
Status: DISCONTINUED | OUTPATIENT
Start: 2018-05-29 | End: 2018-05-29

## 2018-05-29 RX ORDER — IPRATROPIUM BROMIDE AND ALBUTEROL SULFATE 2.5; .5 MG/3ML; MG/3ML
1 SOLUTION RESPIRATORY (INHALATION) 3 TIMES DAILY
Status: DISCONTINUED | OUTPATIENT
Start: 2018-05-29 | End: 2018-06-14 | Stop reason: HOSPADM

## 2018-05-29 RX ADMIN — IPRATROPIUM BROMIDE AND ALBUTEROL SULFATE 3 ML: .5; 3 SOLUTION RESPIRATORY (INHALATION) at 19:30

## 2018-05-29 RX ADMIN — HYDROMORPHONE HYDROCHLORIDE 0.2 MG: 1 INJECTION, SOLUTION INTRAMUSCULAR; INTRAVENOUS; SUBCUTANEOUS at 01:07

## 2018-05-29 RX ADMIN — MIRTAZAPINE 15 MG: 7.5 TABLET ORAL at 22:41

## 2018-05-29 RX ADMIN — Medication 1 CAPSULE: at 08:13

## 2018-05-29 RX ADMIN — Medication 1 CAPSULE: at 20:09

## 2018-05-29 RX ADMIN — POTASSIUM CHLORIDE 20 MEQ: 20 SOLUTION ORAL at 08:13

## 2018-05-29 RX ADMIN — Medication 6.25 MG: at 01:38

## 2018-05-29 RX ADMIN — INSULIN GLARGINE 4 UNITS: 100 INJECTION, SOLUTION SUBCUTANEOUS at 12:22

## 2018-05-29 RX ADMIN — Medication 6.25 MG: at 10:45

## 2018-05-29 RX ADMIN — TORSEMIDE 10 MG: 10 TABLET ORAL at 15:50

## 2018-05-29 RX ADMIN — METOPROLOL TARTRATE 50 MG: 50 TABLET, FILM COATED ORAL at 08:14

## 2018-05-29 RX ADMIN — METOPROLOL TARTRATE 5 MG: 5 INJECTION, SOLUTION INTRAVENOUS at 20:09

## 2018-05-29 RX ADMIN — ACETAMINOPHEN 650 MG: 160 SUSPENSION ORAL at 14:18

## 2018-05-29 RX ADMIN — TORSEMIDE 10 MG: 10 TABLET ORAL at 08:14

## 2018-05-29 RX ADMIN — INSULIN ASPART 2 UNITS: 100 INJECTION, SOLUTION INTRAVENOUS; SUBCUTANEOUS at 04:43

## 2018-05-29 RX ADMIN — DEXMEDETOMIDINE HYDROCHLORIDE 0.3 MCG/KG/HR: 100 INJECTION, SOLUTION INTRAVENOUS at 22:41

## 2018-05-29 RX ADMIN — INSULIN ASPART 2 UNITS: 100 INJECTION, SOLUTION INTRAVENOUS; SUBCUTANEOUS at 08:14

## 2018-05-29 RX ADMIN — FLUTICASONE PROPIONATE 1 SPRAY: 50 SPRAY, METERED NASAL at 08:14

## 2018-05-29 RX ADMIN — INSULIN ASPART 2 UNITS: 100 INJECTION, SOLUTION INTRAVENOUS; SUBCUTANEOUS at 16:00

## 2018-05-29 RX ADMIN — INSULIN GLARGINE 20 UNITS: 100 INJECTION, SOLUTION SUBCUTANEOUS at 22:41

## 2018-05-29 RX ADMIN — INSULIN ASPART 1 UNITS: 100 INJECTION, SOLUTION INTRAVENOUS; SUBCUTANEOUS at 12:22

## 2018-05-29 RX ADMIN — SODIUM CHLORIDE 500 ML: 9 INJECTION, SOLUTION INTRAVENOUS at 23:55

## 2018-05-29 RX ADMIN — INSULIN ASPART 1 UNITS: 100 INJECTION, SOLUTION INTRAVENOUS; SUBCUTANEOUS at 20:15

## 2018-05-29 RX ADMIN — DEXMEDETOMIDINE HYDROCHLORIDE 0.2 MCG/KG/HR: 100 INJECTION, SOLUTION INTRAVENOUS at 02:50

## 2018-05-29 RX ADMIN — MELATONIN TAB 3 MG 6 MG: 3 TAB at 22:41

## 2018-05-29 RX ADMIN — ACETAMINOPHEN 650 MG: 160 SUSPENSION ORAL at 08:13

## 2018-05-29 RX ADMIN — IPRATROPIUM BROMIDE AND ALBUTEROL SULFATE 3 ML: .5; 3 SOLUTION RESPIRATORY (INHALATION) at 14:55

## 2018-05-29 RX ADMIN — DEXMEDETOMIDINE HYDROCHLORIDE 0.1 MCG/KG/HR: 100 INJECTION, SOLUTION INTRAVENOUS at 19:39

## 2018-05-29 RX ADMIN — INSULIN ASPART 2 UNITS: 100 INJECTION, SOLUTION INTRAVENOUS; SUBCUTANEOUS at 23:48

## 2018-05-29 RX ADMIN — METOPROLOL TARTRATE 5 MG: 5 INJECTION, SOLUTION INTRAVENOUS at 01:16

## 2018-05-29 RX ADMIN — METOPROLOL TARTRATE 50 MG: 50 TABLET, FILM COATED ORAL at 15:50

## 2018-05-29 NOTE — PLAN OF CARE
Problem: Patient Care Overview  Goal: Plan of Care/Patient Progress Review  Outcome: No Change  Patient had uneventful night overall. Patient very restless and awake until 0430, then sleeping soundly.   Issues with BP and agitation greatly resolved with Precedex gtt. Patient tolerates Precedex gtt without any signs of allergic reaction, vital signs reacted appropriately. Pt remains on CMV per trach at 40%. HR afib 60s-130s, though more consistently controlled this AM. TF in place through PEG tube. TF at goal of 35 ml/hr, FWF of 70 mL q4 hrs. External catheter in place, patient will pull and dislodge on occasion making for inaccurate I/O. Mitts attempted overnight, however Patient became more agitated and anxious.

## 2018-05-29 NOTE — PROGRESS NOTES
Dr. Moeller notified of continued agitation, anxiety, and hypertension throughout night despite PRN Dilaudid, Metoprolol, and Seroquel. Dr. Moeller rounding at the bedside. Patient to be started on Precedex gtt. No previous reaction listed, though it is listed as allergy. Pharmacy verified and Writer double verified medication OK to administer despite listed allergy. Will continue to monitor very closely for any signs of allergic reaction.

## 2018-05-29 NOTE — PLAN OF CARE
Problem: Patient Care Overview  Goal: Plan of Care/Patient Progress Review  Discharge Planner PT   Patient plan for discharge: None stated   Current status: Pt was sedated earlier, up in chair, sleepy/lethargic. Awake enough to participate in AAROM to all 4 extremities but not to perform mobility. Up in converter chair with lift/nursing.  Barriers to return to prior living situation: family unable to manage her care at home, fatigue, assist level, respiratory status.  Recommendations for discharge: TCU  Rationale for recommendations: Pt is close to baseline mobility. Recommend TCU for continued improvement of functional ability and vitals as it sounds like family might not have the essential resources at home to properly care for pt. Will hopefully be able to return home with children in future with appropriate resources/education.        Entered by: Connie Marcus 05/29/2018 12:11 PM

## 2018-05-29 NOTE — PLAN OF CARE
Problem: Patient Care Overview  Goal: Plan of Care/Patient Progress Review  Outcome: No Change  Pt is alert, restless, and anxious, able to rest at times with sleeping aides. Pt mouths words at times, follows simple commands, pupils are equal and reactive. Lung sounds are diminished, suctioned moderate amount of cream colored secretions per ETT, pt did have an episode of desatting and bradycardia (see provider notify note by writer). Pt placed on CMV at 1600. Tolerating tube feeding, one stool this shift. Incontinent of urine, purewick placed. Wound care completed.

## 2018-05-29 NOTE — PROGRESS NOTES
[facility] ICU RESPIRATORY NOTE  Date of Admission: 5/21/2018  Date of Intubation (most recent):  5/21/18  Reason for Mechanical Ventilation:  Resp failure  Number of Days on Mechanical Ventilation:  8  Met Criteria for Pressure Support Trial:  Yes 12/5 for 7 hrs  Length of Pressure Support Trial:  Reason for Stopping Pressure Support Trial:  Reason for No Pressure Support Trial:    Per MD    Significant Events Today:  Pt had episode of desating and pt had to be bagged and levaged.   Possible plug from end of the ETT.  Pt did recover and pt was returned to previous vent setting.    ABG Results:    ETT appearance on chest x-ray:    Plan:  Pt to remain on full vent support overnight

## 2018-05-29 NOTE — PLAN OF CARE
Problem: Patient Care Overview  Goal: Plan of Care/Patient Progress Review  Outcome: No Change  Alert, CHEMA orientation. CAM+, PRN seroquel for agitation. Weaning 12/5 since 845, then 10/5 [id number], otherwise on full vent support. VSS on 30%. Denies pain. UA sent, UC pending. T/R Q2hrs. Will continue to monitor.

## 2018-05-29 NOTE — PROGRESS NOTES
[facility]    Daily Progress Note  [facility]       Date of Admission:  5/21/2018    Assessment & Plan   Jacques Solis is a 88 year old female with past history A-fib, CHF, severe AS, DM II, asthma, chronic respiratory failure with vent dependence who was discharged home from [facility] on 5/18/18 but did not have appropriate home services arranged, but son also reporting a recent decline in respiratory status.     Acute on chronic hypoxic respiratory failure with vent dependence:  Chronic failure felt due to pleural effusions and overall debility. Pulmonologist at [facility] felt she would chronically be vent dependent and weaning efforts were stopped.  CT chest in ED shows small to moderated loculated bilateral pleural effusions with atelectasis.  Also a 4 cm gil opacity in left upper lobe, but no SIRS criteria present.  Tolerating moderate levels of PS wean for longer periods of time, will continue.    Ventilator status stable.  Tolerating PS 12 and has -270.  Will trial lower PS 10 - suspect part of restriction from kyphosis   - No acute issues, so will work to arrange discharge to home. Social work and care coordination involved    Paroxysmal A-fib:   NSR now  - continue PTA metoprolol (note son has been dosing this at 25 mg q8h)  - INR at therapeutic level; continue coumadin, PharmD to dose    Hx of PE  - coumadin as above    Severe AS  Chronic diastolic CHF  TTE 3/2018 with EF 55-60%, severe AS (area of 0.6-0.7) and mild regurgitation, moderate MR.  Is reportedly not a TAVR candidate.  Potassium has remained normal on daily replacement.  - continue PTA metolazone and torsemide  - Continue scheduled daily potassium    DM II:  Increase lantus 20 units daily with SSI, noted prior daughter requests oral DM medications only except for sliding scale.     Asthma:  Continue PTA nebs.    Recent acute blood loss anemia:  Reportedly due to supratherapeutic lovenox in setting of PAULY.  " Hgb at recent discharge 8.4, now improved to 9-10.    Delirium - with noted encephalopathy -   Discharge summary states probable encephalopathy due to critical illness superimposed on some dementia.  Son denies any history of dementia, patient following commands ok   - continue PTA Seroquel  - check UA/urine culture as cause of worsening delirium    Nonhealing right leg wound  Due to hematoma with necrotic eschar now s/p debridement  - WOC consult following     Renal-  Morris discontinued per family's request.  They understand risk of skin breakdown but have concerns regarding recurrent UTI's.  Rechecked UA given delirium - FUP Culture    Dysphagia  - continue PTA tube feeds    Mild abdominal distension  -possible adynamic ileus, but tolerating tube feeds  -simethicone prn    DVT Prophylaxis: Warfarin    # Pain Assessment:  Current Pain Score 5/29/2018   Patient currently in pain? denies   Pain score (0-10) -   Pain location -   Pain descriptors -   CPOT pain score 0   Jacques newell pain level was assessed and she currently denies pain.        Disposition: Expected discharge as soon as home care assistance is arranged. Will discuss temporary placement at vent facility until home     Abduol Cardenas    Primary Care Physician   Michael Mccarthy     24 hour events:  Delirious over night, precedex gtt added, off this AM , PS trials ongoing       Chief Complaint   Lack of home resources for management    History is obtained from the patient's son (who is an anesthesiologist) and chart review. Recent discharge summary from [facility] said the following \"87-year-old female well-known to the critical care service with a history of some mild memory impairment, but has been essentially hospital-dependent for the last 9 months with severe aortic stenosis, not a candidate for TAVR, chronic diastolic heart failure, chronic bilateral recurrent pleural effusions, chronic vent dependence, history of pulmonary embolism, atrial " "fibrillation, peripheral artery disease with history of critical limb ischemia, who was transferred to [facility] from a long-term care facility. She was transferred due to acute blood loss anemia, elevated leukocytosis and acute renal failure. She was recently started on tobramycin for pseudomonas growing in her sputum.\"      History of Present Illness   Jacques Solis is a 88 year old female who presents with inadequate home support and possible acute on chronic hypoxic respiratory failure.  She has essentially been hospitalized since September 2017 when she was admitted to [facility] for critical limb ischemia and underwent balloon angioplasty which was complicated by femoral artery perforation with hemorrhagic shock.  She also had chronic respiratory and was unable to wean from the vent and was trached and pegged on 11/13/17.  She was also noted to have some encephalopathy.  Ultimately she was discharged to [facility]. She has essentially moved back and forth from [facility] and [facility] and then to [facility] and [facility] ever since.  Her most recent admission to [facility] 4/26-5/4 was due to acute blood loss anemia due to left thigh hematoma in addition to renal failure due to tobramycin toxicity for multi-drug resistant Pseudomonas.  She also underwent debridement of a right lower extremity wound due to an infected hematoma.  She was ultimately discharged to [facility], where she has stayed until discharge home on 5/18/18.  Pulmonology felt that she would not be able to wean and recommended discharge home or to TCU and son elected for discharge home.      He reports he was not set up with anyone who would be able to manage a home vent, reports not being given detailed sliding scale insulin instructions (no dosage parameters), was unable to obtain nebs due to lack of appropriate ICD code on discharge orders, did not have supplies at home to replace clogged tube feeding piece in addition to concerns about the competency of " the home nurse.  Due to these issues he presented to [facility], unclear as to why he did not return to [facility], which is very familiar with the patient's case.  He reports his mother has otherwise been stable with the exception that she experienced several acute desaturation events over the past 2 days which resolved with suctioning.  He reports about 1 month ago she was able to tolerate being off the ventilator for about 16 hours/day and was able to speak with passy wilner valve.    Son reports his goals are for his mother to be able to wean from the vent for at least a few hours and be able to speak with valve and possible eat some food by mouth.  He understands that she may never be able to fully wean from the vent and is accepting of this, however, he wants any reversible conditions treated.    Past Medical History    Paroxysmal atrial fibrillation  Chronic respiratory failure secondary to recurrent pleural effusions and deconditioning  Severe aortic stenosis  Chronic diastolic congestive heart failure  Diabetes mellitus type 2  Asthma   Dysphagia with feeding tube dependence  History of critical limb ischemia    Past Surgical History   D&C  Tracheostomy  PEG tube placement        Allergies   Allergies   Allergen Reactions     Amikacin      Amiodarone      Codeine      Dexmedetomidine      Lisinopril      Penicillins      Sulfa Drugs            Physical Exam   Temp: 97.5  F (36.4  C) Temp src: Axillary BP: 119/72   Heart Rate: 75 Resp: 26 SpO2: 100 % O2 Device: Mechanical Ventilator    Vital Signs with Ranges  Temp:  [97.4  F (36.3  C)-97.8  F (36.6  C)] 97.5  F (36.4  C)  Heart Rate:  [] 75  Resp:  [8-37] 26  BP: ()/() 119/72  FiO2 (%):  [30 %-40 %] 30 %  SpO2:  [94 %-100 %] 100 %  183 lbs 13.82 oz    Constitutional:  Elderly female appears stated aged awake and alert.  Mouths words but difficult to understand.   Eyes: pupils equal, round and reactive to light,  HEENT: , trach in place, [place]    Respiratory: lungs clear to auscultation bilaterally, synchronous with vent  Cardiovascular: RR rhythm, normal S1/S2, no murmur, rubs or gallops  GI: abdomen soft, non-tender, somewhat distended, normal bowel sounds, no hepatosplenomegaly, PEG tube in place,  Extremities : thigh edema warm well perfused  Skin: dressing over skin wound right thigh.   Neuro: intermittently following commands , moving all 4 extremities    I  Data   Data reviewed today:  I personally reviewed no images or EKG's today.    Recent Labs  Lab 05/29/18  0356 05/28/18  0442 05/27/18  0445  05/23/18  0443   WBC 15.1* 8.7 11.1*  < > 9.6   HGB 10.6* 9.0* 9.5*  < > 9.5*   MCV 89 90 91  < > 89    239 258  < > 284   INR 3.43* 3.01* 2.49*  < > 3.11*    139 140  < > 139   POTASSIUM 4.8 4.2 4.2  < > 3.6   CHLORIDE 96 98 99  < > 96   CO2 34* 34* 37*  < > 34*   BUN 67* 60* 61*  < > 64*   CR 1.30* 1.16* 1.11*  < > 1.20*   ANIONGAP 7 7 4  < > 9   KRISTOFER 8.9 8.6 8.6  < > 8.6   * 149* 204*  < > 170*   ALBUMIN  --   --   --   --  2.3*   PROTTOTAL  --   --   --   --  6.4*   BILITOTAL  --   --   --   --  0.5   ALKPHOS  --   --   --   --  224*   ALT  --   --   --   --  47   AST  --   --   --   --  49*   < > = values in this interval not displayed.    Recent Results (from the past 24 hour(s))   XR Chest Port 1 View    Narrative    PORTABLE CHEST ONE VIEW 5/28/2018 6:10 PM     COMPARISON: Frontal chest x-ray 5/21/2018    HISTORY: Dropping sats, difficult to bag.      Impression    IMPRESSION: Right PICC line again noted with its tip in the upper  superior vena cava. Obscuration of the left hemidiaphragm likely due  to a left pleural effusion again noted. Small right pleural effusion  again noted. There is prominence of the perihilar pulmonary  vasculature and increased interstitial prominence in both lungs  suggesting congestive failure with pulmonary edema. There is no  pneumothorax. Heart size remains at least moderately enlarged.    GOLDY  MD DEJON       30 min CCT

## 2018-05-29 NOTE — PROGRESS NOTES
[facility] ICU RESPIRATORY NOTE  Date of Admission: 5/21/2018  Date of Intubation (most recent): 5/21/18  Reason for Mechanical Ventilation: Acute on chronic respiratory failure with vent dependency  Number of Days on Mechanical Ventilation:  Met Criteria for Pressure Support Trial: yes  Length of Pressure Support Trial: PS 12/5 x ~2 hours, then PS 10/5 for ~ 3 hours.  Back to CMV to rest    Ventilation Mode: CMV/AC  (Continuous Mandatory Ventilation/ Assist Control)  FiO2 (%): 30 %  Rate Set (breaths/minute): 16 breaths/min  Tidal Volume Set (mL): 360 mL  PEEP (cm H2O): 5 cmH2O  Pressure Support (cm H2O): 10 cmH2O  Oxygen Concentration (%): 30 %  Resp: 25        Plan:    Continue ventilatory support.  PS trials daily  PMV with speech

## 2018-05-29 NOTE — PROGRESS NOTES
Saundra is coming on Thursday 5/31 and has accepted case for HHC. Also will put in a referral for other HHC.  List for home:  1: Swallow Test=done  2: Home OT/PT.  3. Sliding scale  4. Wound Care:Ascitic acid, what to do, instructions  5. Nebs, ICD code so Medicare covers it.  6. Home INR machine---ordered   7. Glucometer---> covered by Medicare  9. Wheelchair---->covered by Medicare  10. Home lab draws---->covered by Medicare  11: Need MD follow up for Vent and Medical questions  12. Extra G tube supplies  13. Home Care/ for home to show how to work things, change G Tube, proper Trach Care instructions,         Proper wound care instruction,  14. RT to show family to use speaking valve attachment, [employer] will come out again to instruct patient.         Transportation company # [phone]   Judie Lloyd RN BSN  [facility] Care Coordinator  Phone [phone]             Revision History

## 2018-05-29 NOTE — PLAN OF CARE
Problem: Patient Care Overview  Goal: Plan of Care/Patient Progress Review  SLP: Attempted to see patient for the Passy Sanford Valve trials, while on PS. Daughter at bedside and refused the trial, stating her mother had a ruff day and is finally calm and did not want to do anything to increase her agitation. Will reschedule for 5/30/18.

## 2018-05-29 NOTE — PROGRESS NOTES
[facility] ICU RESPIRATORY NOTE  Date of Admission: 5/21/2018  Date of Intubation (most recent):  5/21/18  Reason for Mechanical Ventilation:  Resp failure  Number of Days on Mechanical Ventilation:  9  Met Criteria for Pressure Support Trial:  Yes   Length of Pressure Support Trial: 12/5 for 7 hrs  Reason for Stopping Pressure Support Trial: Rest overnight  Reason for No Pressure Support Trial:       No lab results found in last 7 days.    Ventilation Mode: CMV/AC  (Continuous Mandatory Ventilation/ Assist Control)  FiO2 (%): 40 %  Rate Set (breaths/minute): 16 breaths/min  Tidal Volume Set (mL): 360 mL  PEEP (cm H2O): 5 cmH2O  Pressure Support (cm H2O): 12 cmH2O  Oxygen Concentration (%): 30 %  Resp: 17    Will continue to follow.     Jose Arellano RT

## 2018-05-29 NOTE — PROGRESS NOTES
[facility] Nurse Inpatient Wound Assessment     Follow up Assessment of wound(s) on pt's:   Right lateral calf; left hand        Data:   Patient History:      per MD note(s): history A-fib, CHF, severe AS, DM II, asthma, chronic respiratory failure with vent dependence who was discharged home from [facility] on 18 but did not have appropriate home services arranged, but son also reporting a recent decline in respiratory status.   Marcio Assessment and sub scores:   Marcio Score  Av.2  Min: 11  Max: 16     Positioning: Pillows,     Mattress:  Standard , Atmos Air mattress    Current Diet / Nutrition:       Active Diet Order      NPO for Medical/Clinical Reasons Except for: No Exceptions    Labs:   Recent Labs   Lab Test  18   0410  18   0130   HGB  9.5*   --    INR  2.31*   --    WBC  8.2   --    A1C   --   6.0*       Wound Assessment (location):   Right, lateral, posterior calf  Wound History:  debridement    Wound Base: red, shiney , granulation    Specific Dimensions (length x width x depth, in cm) :   8.5 x5x 1.2cm    Tunneling:  N/A    Undermining: N/A    Palpation of the wound bed:  normal    Slough appearance:  none    Eschar appearance:  none    Periwound Skin: edema,      Color: normal and consistent with surrounding tissue    Temperature  normal     Drainage:  yellow to serous    Odor: none    Pain:  none    Wound Assessment (location):   Back of left hand  Wound History:  skin tear    Wound Base: pink ,  moist    Specific Dimensions (length x width x depth, in cm) :   1cm x 0.7cm x 0.2cm    Tunneling:  N/A    Undermining: N/A    Palpation of the wound bed:  normal    Slough appearance:  none    Eschar appearance:  none    Periwound Skin: fragile,      Color: normal and consistent with surrounding tissue    Temperature  normal     Drainage:  serosanguineous     Odor: none    Pain:  minimal , tender          Intervention:     Patient's chart evaluated.      Assessed  "wounds as noted above    Discussed plan of care with Nurse and daughter             All patient / family questions answered- yes, discussed with daughter          Assessment:       Surgical debridement wound to right calf has yellow exudate with shiney base and appears to be colonized though less lumpy and more vibrant red today than assessment 3 weeks ago.   Skin tears to right hand with smaller dimensions, drying out, will leave open to the air.         Plan:     Nursing to notify the Provider(s) and re-consult the [facility] Nurse if wound(s) deteriorate(s) or if the wound care plan needs reevaluation.    Plan of care for wound located on : Wound(s) right lateral calf (daily) and left hand  1. Clean wounds with MicroKlenz Spray, pat dry  2. Paint periwound tissue with No Sting Skin Prep (#[id number])) and allow to dry thoroughly  3. Apply small smear of Iodosorb Gel to Mepilex Sacral dressing in the size/ shape of wound on right lateral calf and press to wound.   4. May leave the wound on the back of the left hand open to the air.  If still having drainage cover with mepilex Border dressing every other day, odd days   5. Time and date dressing change and zeke with a \"T\" for treatment of a wound  6. Reposition pt every 1 to 2 hours when in bed and hourly when up to the chair to relieve pressure and promote perfusion to tissue.  Keep heels elevated at all times  NOTE:  Iodosorb Gel should not be used longer than 14 days. After 14 days the gel should be stopped and a new plan established, this may mean only a simple, gauze dressing.  The Iodosorb Gel should be stopped after use on June 14, 2018.     Pressure INJURY Prevention Measures (PIP):  If pt is refusing to turn or reposition they must be educated on the  potential injury from not off loading pressure.  Then this \"educated refusal\" needs to be documented as an \"educated refusal to turn/ reposition\" and document if alert, etc.    1. Repositioning:  Pt must be repositioned " "for good skin health.  If pt refusing or this is not being done then the charge nurse, nurse manager and md need to be notified to help intervene and to know that there is an issue    Bed:  Reposition pt MINIMALLY every 1-2 hours in bed to relieve pressure and promote perfusion to tissue. Also try to position to get airflow to pt s backside, etc. If necessary consider use of Fluidized Positioner Pads ((758886 small/ [id number] med/ [id number] large) to help maintain pt in good offloading-position.               Use pillows in the lower back and upper thighs to support postioning but keep pillows off butt to minimize any pressure.    Chair:  When up to chair pt should not sit for longer than one hour total before either standing or returning to bed for at least 10 minutes, again to relieve pressure and promote perfusion to tissue.     o Pt should also sit on a chair cushion (#[id number]) when up to the chair.  o Do NOT use a donut to sit on.  This concentrates pressure in a smaller area and creates a higher potential for injury where the cushion is.   o When pt returns to bed he/she should be positioned on side  o Do not sit on a Z-Flow Pad.  This is not a chair cushion, it is for positioning  If pt is refusing to turn or reposition they must be educated on the  potential injury from not off loading pressure.  Then this \"educated refusal\" needs to be documented as an \"educated refusal to turn/ reposition\" and document if alert, etc.  2.  Patient's skin must be kept clean and dry- the areas are only clean when you see the base of the skin fold, especially the perineal area.  Moist, macerated tissue is more susceptible to injury.           Follow Incontinence Protocol found in [employer] Facts.  Dust with baby powder BID to help with chaffing, decrease warmth from friction and increase comfort.         NO BRIEFS WITH CATHETERS  3.  Follow the bed algorithm to consider a specialty mattress  4.  If able keep head of bed below 30 degrees. "   5.  Follow Marcio Risk recommendations  6.  Be aware of the bony prominences!    Elevate heels at all times, consider using heel lift boots, Hugo or Rooke    Apply skin prep to bony prominences such as elbows, heels, hips- and consider wearing long sleeves and/or pants at al times  7.  Remember to perform full skin inspection 2 x / day- unless ordered NOT to order an area skin must be assessed.  If not able to do a full skin inspection then document full inspection along with the exception of what was not assessed.         WOC Nurse will return: weekly and prn

## 2018-05-30 ENCOUNTER — APPOINTMENT (OUTPATIENT)
Dept: CT IMAGING | Facility: CLINIC | Age: 83
DRG: 207 | End: 2018-05-30
Attending: INTERNAL MEDICINE
Payer: MEDICARE

## 2018-05-30 LAB
ABO + RH BLD: NORMAL
ABO + RH BLD: NORMAL
ALBUMIN SERPL-MCNC: 2 G/DL (ref 3.4–5)
ALBUMIN SERPL-MCNC: 2.2 G/DL (ref 3.4–5)
ALP SERPL-CCNC: 148 U/L (ref 40–150)
ALP SERPL-CCNC: 167 U/L (ref 40–150)
ALT SERPL W P-5'-P-CCNC: 42 U/L (ref 0–50)
ALT SERPL W P-5'-P-CCNC: 46 U/L (ref 0–50)
ANION GAP SERPL CALCULATED.3IONS-SCNC: 6 MMOL/L (ref 3–14)
ANION GAP SERPL CALCULATED.3IONS-SCNC: 8 MMOL/L (ref 3–14)
AST SERPL W P-5'-P-CCNC: 40 U/L (ref 0–45)
AST SERPL W P-5'-P-CCNC: 43 U/L (ref 0–45)
BACTERIA SPEC CULT: ABNORMAL
BASOPHILS # BLD AUTO: 0 10E9/L (ref 0–0.2)
BASOPHILS NFR BLD AUTO: 0.3 %
BILIRUB SERPL-MCNC: 0.4 MG/DL (ref 0.2–1.3)
BILIRUB SERPL-MCNC: 0.5 MG/DL (ref 0.2–1.3)
BLD PROD TYP BPU: NORMAL
BLD UNIT ID BPU: 0
BLOOD PRODUCT CODE: NORMAL
BPU ID: NORMAL
BUN SERPL-MCNC: 70 MG/DL (ref 7–30)
BUN SERPL-MCNC: 70 MG/DL (ref 7–30)
CALCIUM SERPL-MCNC: 8 MG/DL (ref 8.5–10.1)
CALCIUM SERPL-MCNC: 8.4 MG/DL (ref 8.5–10.1)
CHLORIDE SERPL-SCNC: 100 MMOL/L (ref 94–109)
CHLORIDE SERPL-SCNC: 100 MMOL/L (ref 94–109)
CO2 SERPL-SCNC: 32 MMOL/L (ref 20–32)
CO2 SERPL-SCNC: 33 MMOL/L (ref 20–32)
CREAT SERPL-MCNC: 1.21 MG/DL (ref 0.52–1.04)
CREAT SERPL-MCNC: 1.29 MG/DL (ref 0.52–1.04)
DIFFERENTIAL METHOD BLD: ABNORMAL
EOSINOPHIL # BLD AUTO: 0.1 10E9/L (ref 0–0.7)
EOSINOPHIL NFR BLD AUTO: 1.2 %
ERYTHROCYTE [DISTWIDTH] IN BLOOD BY AUTOMATED COUNT: 17.3 % (ref 10–15)
ERYTHROCYTE [DISTWIDTH] IN BLOOD BY AUTOMATED COUNT: 18.2 % (ref 10–15)
ERYTHROCYTE [DISTWIDTH] IN BLOOD BY AUTOMATED COUNT: 18.5 % (ref 10–15)
GFR SERPL CREATININE-BSD FRML MDRD: 39 ML/MIN/1.7M2
GFR SERPL CREATININE-BSD FRML MDRD: 42 ML/MIN/1.7M2
GLUCOSE BLDC GLUCOMTR-MCNC: 136 MG/DL (ref 70–99)
GLUCOSE BLDC GLUCOMTR-MCNC: 145 MG/DL (ref 70–99)
GLUCOSE BLDC GLUCOMTR-MCNC: 171 MG/DL (ref 70–99)
GLUCOSE BLDC GLUCOMTR-MCNC: 172 MG/DL (ref 70–99)
GLUCOSE BLDC GLUCOMTR-MCNC: 192 MG/DL (ref 70–99)
GLUCOSE SERPL-MCNC: 163 MG/DL (ref 70–99)
GLUCOSE SERPL-MCNC: 206 MG/DL (ref 70–99)
HCT VFR BLD AUTO: 22.1 % (ref 35–47)
HCT VFR BLD AUTO: 23 % (ref 35–47)
HCT VFR BLD AUTO: 23 % (ref 35–47)
HCT VFR BLD AUTO: 26 % (ref 35–47)
HGB BLD-MCNC: 6.8 G/DL (ref 11.7–15.7)
HGB BLD-MCNC: 7.2 G/DL (ref 11.7–15.7)
HGB BLD-MCNC: 7.2 G/DL (ref 11.7–15.7)
HGB BLD-MCNC: 8.3 G/DL (ref 11.7–15.7)
IMM GRANULOCYTES # BLD: 0.1 10E9/L (ref 0–0.4)
IMM GRANULOCYTES NFR BLD: 0.6 %
INR PPP: 2.89 (ref 0.86–1.14)
INR PPP: 4.5 (ref 0.86–1.14)
LACTATE BLD-SCNC: 1.9 MMOL/L (ref 0.7–2)
LACTATE BLD-SCNC: 2.7 MMOL/L (ref 0.7–2)
LYMPHOCYTES # BLD AUTO: 0.9 10E9/L (ref 0.8–5.3)
LYMPHOCYTES NFR BLD AUTO: 8.9 %
Lab: ABNORMAL
MCH RBC QN AUTO: 27.2 PG (ref 26.5–33)
MCH RBC QN AUTO: 27.7 PG (ref 26.5–33)
MCH RBC QN AUTO: 27.9 PG (ref 26.5–33)
MCHC RBC AUTO-ENTMCNC: 30.8 G/DL (ref 31.5–36.5)
MCHC RBC AUTO-ENTMCNC: 31.3 G/DL (ref 31.5–36.5)
MCHC RBC AUTO-ENTMCNC: 31.9 G/DL (ref 31.5–36.5)
MCV RBC AUTO: 88 FL (ref 78–100)
MCV RBC AUTO: 88 FL (ref 78–100)
MCV RBC AUTO: 89 FL (ref 78–100)
MONOCYTES # BLD AUTO: 0.4 10E9/L (ref 0–1.3)
MONOCYTES NFR BLD AUTO: 4 %
NEUTROPHILS # BLD AUTO: 8.6 10E9/L (ref 1.6–8.3)
NEUTROPHILS NFR BLD AUTO: 85 %
NRBC # BLD AUTO: 0 10*3/UL
NRBC BLD AUTO-RTO: 0 /100
PLATELET # BLD AUTO: 213 10E9/L (ref 150–450)
PLATELET # BLD AUTO: 260 10E9/L (ref 150–450)
PLATELET # BLD AUTO: 269 10E9/L (ref 150–450)
POTASSIUM SERPL-SCNC: 4.4 MMOL/L (ref 3.4–5.3)
POTASSIUM SERPL-SCNC: 4.9 MMOL/L (ref 3.4–5.3)
PROCALCITONIN SERPL-MCNC: 1.12 NG/ML
PROT SERPL-MCNC: 5.6 G/DL (ref 6.8–8.8)
PROT SERPL-MCNC: 5.9 G/DL (ref 6.8–8.8)
RBC # BLD AUTO: 2.5 10E12/L (ref 3.8–5.2)
RBC # BLD AUTO: 2.6 10E12/L (ref 3.8–5.2)
RBC # BLD AUTO: 2.97 10E12/L (ref 3.8–5.2)
SODIUM SERPL-SCNC: 139 MMOL/L (ref 133–144)
SODIUM SERPL-SCNC: 140 MMOL/L (ref 133–144)
SPECIMEN EXP DATE BLD: NORMAL
SPECIMEN SOURCE: ABNORMAL
TRANSFUSION STATUS PATIENT QL: NORMAL
TRANSFUSION STATUS PATIENT QL: NORMAL
WBC # BLD AUTO: 10.1 10E9/L (ref 4–11)
WBC # BLD AUTO: 14.1 10E9/L (ref 4–11)
WBC # BLD AUTO: 14.6 10E9/L (ref 4–11)

## 2018-05-30 PROCEDURE — 27210432 ZZH NUTRITION PRODUCT RENAL BASIC LITER

## 2018-05-30 PROCEDURE — 25000131 ZZH RX MED GY IP 250 OP 636 PS 637: Mod: GY | Performed by: NURSE PRACTITIONER

## 2018-05-30 PROCEDURE — 86850 RBC ANTIBODY SCREEN: CPT | Performed by: INTERNAL MEDICINE

## 2018-05-30 PROCEDURE — A9270 NON-COVERED ITEM OR SERVICE: HCPCS | Mod: GY | Performed by: INTERNAL MEDICINE

## 2018-05-30 PROCEDURE — 25000132 ZZH RX MED GY IP 250 OP 250 PS 637: Mod: GY | Performed by: INTERNAL MEDICINE

## 2018-05-30 PROCEDURE — 85610 PROTHROMBIN TIME: CPT | Performed by: INTERNAL MEDICINE

## 2018-05-30 PROCEDURE — 40000281 ZZH STATISTIC TRANSPORT TIME EA 15 MIN

## 2018-05-30 PROCEDURE — 40000239 ZZH STATISTIC VAT ROUNDS

## 2018-05-30 PROCEDURE — 84145 PROCALCITONIN (PCT): CPT | Performed by: INTERNAL MEDICINE

## 2018-05-30 PROCEDURE — 85018 HEMOGLOBIN: CPT | Mod: 91

## 2018-05-30 PROCEDURE — 85025 COMPLETE CBC W/AUTO DIFF WBC: CPT | Performed by: INTERNAL MEDICINE

## 2018-05-30 PROCEDURE — 86900 BLOOD TYPING SEROLOGIC ABO: CPT | Performed by: INTERNAL MEDICINE

## 2018-05-30 PROCEDURE — A9270 NON-COVERED ITEM OR SERVICE: HCPCS | Mod: GY | Performed by: HOSPITALIST

## 2018-05-30 PROCEDURE — 86901 BLOOD TYPING SEROLOGIC RH(D): CPT | Performed by: INTERNAL MEDICINE

## 2018-05-30 PROCEDURE — 99291 CRITICAL CARE FIRST HOUR: CPT | Performed by: INTERNAL MEDICINE

## 2018-05-30 PROCEDURE — 80053 COMPREHEN METABOLIC PANEL: CPT | Performed by: INTERNAL MEDICINE

## 2018-05-30 PROCEDURE — 85027 COMPLETE CBC AUTOMATED: CPT | Performed by: INTERNAL MEDICINE

## 2018-05-30 PROCEDURE — 85014 HEMATOCRIT: CPT

## 2018-05-30 PROCEDURE — 94003 VENT MGMT INPAT SUBQ DAY: CPT

## 2018-05-30 PROCEDURE — 94640 AIRWAY INHALATION TREATMENT: CPT

## 2018-05-30 PROCEDURE — 74176 CT ABD & PELVIS W/O CONTRAST: CPT

## 2018-05-30 PROCEDURE — 94640 AIRWAY INHALATION TREATMENT: CPT | Mod: 76

## 2018-05-30 PROCEDURE — 25000128 H RX IP 250 OP 636

## 2018-05-30 PROCEDURE — 25000125 ZZHC RX 250: Performed by: INTERNAL MEDICINE

## 2018-05-30 PROCEDURE — 20000003 ZZH R&B ICU

## 2018-05-30 PROCEDURE — 40000008 ZZH STATISTIC AIRWAY CARE

## 2018-05-30 PROCEDURE — 25000132 ZZH RX MED GY IP 250 OP 250 PS 637: Mod: GY | Performed by: HOSPITALIST

## 2018-05-30 PROCEDURE — 40000275 ZZH STATISTIC RCP TIME EA 10 MIN

## 2018-05-30 PROCEDURE — 86900 BLOOD TYPING SEROLOGIC ABO: CPT | Performed by: HOSPITALIST

## 2018-05-30 PROCEDURE — 25000128 H RX IP 250 OP 636: Performed by: INTERNAL MEDICINE

## 2018-05-30 PROCEDURE — P9016 RBC LEUKOCYTES REDUCED: HCPCS | Performed by: INTERNAL MEDICINE

## 2018-05-30 PROCEDURE — 86923 COMPATIBILITY TEST ELECTRIC: CPT | Performed by: INTERNAL MEDICINE

## 2018-05-30 PROCEDURE — 00000146 ZZHCL STATISTIC GLUCOSE BY METER IP

## 2018-05-30 PROCEDURE — 25000128 H RX IP 250 OP 636: Performed by: HOSPITALIST

## 2018-05-30 PROCEDURE — 85027 COMPLETE CBC AUTOMATED: CPT

## 2018-05-30 PROCEDURE — 83605 ASSAY OF LACTIC ACID: CPT | Performed by: INTERNAL MEDICINE

## 2018-05-30 RX ORDER — DIVALPROEX SODIUM 125 MG/1
125 TABLET, DELAYED RELEASE ORAL EVERY 8 HOURS SCHEDULED
Status: DISCONTINUED | OUTPATIENT
Start: 2018-05-30 | End: 2018-05-30

## 2018-05-30 RX ADMIN — VALPROATE SODIUM 250 MG: 100 INJECTION, SOLUTION INTRAVENOUS at 12:09

## 2018-05-30 RX ADMIN — Medication 6.25 MG: at 23:38

## 2018-05-30 RX ADMIN — INSULIN ASPART 2 UNITS: 100 INJECTION, SOLUTION INTRAVENOUS; SUBCUTANEOUS at 03:51

## 2018-05-30 RX ADMIN — IPRATROPIUM BROMIDE AND ALBUTEROL SULFATE 3 ML: .5; 3 SOLUTION RESPIRATORY (INHALATION) at 19:36

## 2018-05-30 RX ADMIN — Medication 1 CAPSULE: at 08:41

## 2018-05-30 RX ADMIN — FLUTICASONE PROPIONATE 1 SPRAY: 50 SPRAY, METERED NASAL at 08:34

## 2018-05-30 RX ADMIN — INSULIN ASPART 2 UNITS: 100 INJECTION, SOLUTION INTRAVENOUS; SUBCUTANEOUS at 12:27

## 2018-05-30 RX ADMIN — PHYTONADIONE 0.5 MG: 2 INJECTION, EMULSION INTRAMUSCULAR; INTRAVENOUS; SUBCUTANEOUS at 11:10

## 2018-05-30 RX ADMIN — METOPROLOL TARTRATE 50 MG: 50 TABLET, FILM COATED ORAL at 23:37

## 2018-05-30 RX ADMIN — Medication 1 CAPSULE: at 20:13

## 2018-05-30 RX ADMIN — INSULIN GLARGINE 20 UNITS: 100 INJECTION, SOLUTION SUBCUTANEOUS at 22:56

## 2018-05-30 RX ADMIN — INSULIN ASPART 1 UNITS: 100 INJECTION, SOLUTION INTRAVENOUS; SUBCUTANEOUS at 16:13

## 2018-05-30 RX ADMIN — VALPROIC ACID 125 MG: 250 SOLUTION ORAL at 21:39

## 2018-05-30 RX ADMIN — METOPROLOL TARTRATE 5 MG: 5 INJECTION, SOLUTION INTRAVENOUS at 16:00

## 2018-05-30 RX ADMIN — ONDANSETRON 4 MG: 2 INJECTION INTRAMUSCULAR; INTRAVENOUS at 11:15

## 2018-05-30 RX ADMIN — POTASSIUM CHLORIDE 20 MEQ: 20 SOLUTION ORAL at 08:41

## 2018-05-30 RX ADMIN — METOPROLOL TARTRATE 50 MG: 50 TABLET, FILM COATED ORAL at 08:41

## 2018-05-30 RX ADMIN — IPRATROPIUM BROMIDE AND ALBUTEROL SULFATE 3 ML: .5; 3 SOLUTION RESPIRATORY (INHALATION) at 10:54

## 2018-05-30 RX ADMIN — SODIUM CHLORIDE 500 ML: 9 INJECTION, SOLUTION INTRAVENOUS at 01:50

## 2018-05-30 RX ADMIN — TORSEMIDE 10 MG: 10 TABLET ORAL at 08:41

## 2018-05-30 RX ADMIN — IPRATROPIUM BROMIDE AND ALBUTEROL SULFATE 3 ML: .5; 3 SOLUTION RESPIRATORY (INHALATION) at 07:15

## 2018-05-30 RX ADMIN — INSULIN ASPART 1 UNITS: 100 INJECTION, SOLUTION INTRAVENOUS; SUBCUTANEOUS at 08:37

## 2018-05-30 RX ADMIN — TORSEMIDE 10 MG: 10 TABLET ORAL at 15:59

## 2018-05-30 RX ADMIN — INSULIN ASPART 1 UNITS: 100 INJECTION, SOLUTION INTRAVENOUS; SUBCUTANEOUS at 23:40

## 2018-05-30 NOTE — PLAN OF CARE
Problem: Patient Care Overview  Goal: Plan of Care/Patient Progress Review  PT: Pt with HR in 140s at rest. Holding PT at this time.

## 2018-05-30 NOTE — PROGRESS NOTES
CLINICAL NUTRITION SERVICES - REASSESSMENT NOTE      Future/Additional Recommendations: Once able to resume TF, recommend Nepro at 35 mL/hr as patient was previously receiving   Once TF resumed, may wish to consider increasing Lantus for improved BG control    Malnutrition:   (5/22)  % Weight Loss:  None noted  % Intake:  No decreased intake noted  Subcutaneous Fat Loss:  None observed  Muscle Loss:  None observed  Fluid Retention:  None noted      Malnutrition Diagnosis: Patient does not meet two of the above criteria necessary for diagnosing malnutrition        EVALUATION OF PROGRESS TOWARD GOALS   Diet:  NPO on vent   Nutrition Support:  Patient has been receiving goal TF up until 1100 today.  TF was turned off in order to check an abdominal CT for possible RP bleed.  Goal TF was running (per home regimen) as follows ~    Nutrition Support Enteral:  Type of Feeding Tube: G-tube   Enteral Frequency:  Continuous  Enteral Regimen: Nepro at 35 mL/hr   Total Enteral Provisions: 1512 kcal (24 kcal/kg), 68 g protein (1.1 g/kg), 135 g CHO, 11 g fiber, 613 mL H2O  Free Water Flush: 70 mL every 4 hours     Intake/Tolerance:    BUN 70 (H), Cr 1.21 (H) - CKD   , 173, 194, 206, 172, 192 - Med SSI + Lantus 20 units at HS   BM x 1 today - Receiving Culturell for bowel health   Weight 83.9 kg (stable)       ASSESSED NUTRITION NEEDS PER APPROVED PRACTICE GUIDELINES:      Dosing Weight 61.8 kg   Estimated Energy Needs: 8530-0399 kcals (20-25 Kcal/Kg)  Justification: obese and vented  Estimated Protein Needs: 50-75 grams protein (0.8-1.2 g pro/Kg)  Justification: CKD      NEW FINDINGS:   5/29:  WOCN = Surgical debridement wound to right calf has yellow exudate with shiny base and appears to be colonized thru less lumpy and more vibrant red today than assessment 3 weeks ago. Skin tears to right hand with smaller dimensions, drying out.     Previous Goals (5/25):   Nepro at 35 mL/hr will continue to meet % needs    Evaluation: Met    Previous Nutrition Diagnosis (5/25):   No nutrition diagnosis identified at this time  Evaluation: Declining      CURRENT NUTRITION DIAGNOSIS  Inadequate protein-energy intake related to NPO, TF on hold as evidenced by meeting 0% needs     INTERVENTIONS  Recommendations / Nutrition Prescription  Once able to resume TF, recommend Nepro at 35 mL/hr as patient was previously receiving   Once TF resumed, may wish to consider increasing Lantus for improved BG control     Implementation  None     Goals  Patient will receive nutrition within the next 24-48 hours     MONITORING AND EVALUATION:  Progress towards goals will be monitored and evaluated per protocol and Practice Guidelines    Alma Arcos RD, LD, CNSC   Clinical Dietitian - [facility]

## 2018-05-30 NOTE — PROGRESS NOTES
Care Coordination:    Daughter was able to find an INR machine, received fax for machine. Placed in front of chart.    Judie Lloyd RN BSN  [facility] Care Coordinator  Phone [phone]

## 2018-05-30 NOTE — PLAN OF CARE
Problem: Patient Care Overview  Goal: Plan of Care/Patient Progress Review  ST session cancelled. Attempted to see pt for PMSV inline trials, however pt busy heading down to CT. Per discussion with RN, pts family has been refusing use of PMSV this date and not appropriate d/t HR in 140s at rest. Will reduce tx frequency to x3/week and follow up as appropriate.

## 2018-05-30 NOTE — PLAN OF CARE
Problem: Patient Care Overview  Goal: Plan of Care/Patient Progress Review  Outcome: No Change  Patient confused, restless, agitated and delirious throughout night. Aggressive at times. Cam ICU positive. HR 60-160s Afib. 2+ pulse BUE and BLE. Pitting edema to BLE. Low BP throughout night, received 1 L total NS. MAP goal 65 and above per Dr. Moeller. AM labs showing decrease in hemoglobin overnight, no signs of bleeding. Remained on CMV throughout night via Trach. Tolerated well, lungs remain diminished, no changes overnight. External catheter in place, leaks occasionally due to Patient pulling at tubing, overall appears to be efficient in managing incontinence issues. No new skin issues identified. Continue to monitor.

## 2018-05-30 NOTE — PROGRESS NOTES
Date of Admission: 5/21/2018  Date of Intubation (most recent):  5/21/18  Reason for Mechanical Ventilation:  Resp failure  Number of Days on Mechanical Ventilation:  10  Met Criteria for Pressure Support Trial:  Yes   Length of Pressure Support Trial: 30 minutes  Reason for Stopping Pressure Support Trial: High RR, low volumes    Ventilation Mode: CMV/AC  (Continuous Mandatory Ventilation/ Assist Control)  FiO2 (%): 30 %  Rate Set (breaths/minute): 16 breaths/min  Tidal Volume Set (mL): 360 mL  PEEP (cm H2O): 5 cmH2O  Pressure Support (cm H2O): 10 cmH2O  Oxygen Concentration (%): 30 %  Resp: 25    No lab results found in last 7 days.   Plan: weaning trial to on pressure support of 12, peep of 5.Stopped weaning trial for condition change. Will try again in the am. Shira

## 2018-05-30 NOTE — PROGRESS NOTES
BP briefly improved after 1st bolus 500 mL NS, and afib consistently controlled.    MAPs and Systolic dropped below acceptable range shortly after 1st bolus. Precedex off, Dr. Moeller notified, 2nd 500 mL NS bolus ordered. Pt remains afebrile. Continue to monitor.

## 2018-05-30 NOTE — PLAN OF CARE
Problem: Patient Care Overview  Goal: Plan of Care/Patient Progress Review  Outcome: No Change  Patient remains confused and delirious, vital signs stable. Had CT scan today, tolerated well. Hgb 6.8 received one unit prbc, re check Hgb 8.3, Dr Dennis aware. Daughter at bedside for visit and update.

## 2018-05-30 NOTE — PLAN OF CARE
Problem: Patient Care Overview  Goal: Plan of Care/Patient Progress Review  Outcome: No Change  Dr Dennis notified of Hgb of 6.8 called from lab at 1300, Dr Notified at 1300.

## 2018-05-30 NOTE — PROGRESS NOTES
Pt attempting to hit staff. Pulling at lines/taking off monitoring devices. Lines moved out of reach and off patient as much as possible, Pt still agitated. BP low and restricting Precedex titration. Dr. Moeller notified of low BP, consistently uncontrolled afib, and Pt agitation. Pt afebrile. 500 mL bolus NS ordered, Pt to remain on precedex gtt due to agitation. Continue to monitor.

## 2018-05-30 NOTE — PROGRESS NOTES
[facility]    Daily Progress Note  [facility]       Date of Admission:  5/21/2018    Main Plan for day  1. Add depakote  2. CT ABD Pelvis  3. Vitamin K  4. Transfuse 1 u PRBC      Assessment & Plan   Jacques Solis is a 88 year old female with past history A-fib, CHF, severe AS, DM II, asthma, chronic respiratory failure with vent dependence who was discharged home from [facility] on 5/18/18 but did not have appropriate home services arranged, but son also reporting a recent decline in respiratory status.     Acute on chronic hypoxic respiratory failure with vent dependence:  Chronic failure felt due to pleural effusions and overall debility. Pulmonologist at [facility] felt she would chronically be vent dependent and weaning efforts were stopped.  CT chest in ED shows small to moderated loculated bilateral pleural effusions with atelectasis.  Also a 4 cm gil opacity in left upper lobe, but no SIRS criteria present.  Tolerating moderate levels of PS wean for longer periods of time, will continue.    Ventilator status stable.  Tolerating PS 12 and has -270.  Will trial lower PS 10 - suspect part of restriction from kyphosis   - No acute issues, so will work to arrange discharge to home. Social work and care coordination involved    Paroxysmal A-fib:   Intermittent rapid - which is evolving from NSR   - continue PTA metoprolol (note son has been dosing this at 25 mg q8h)  - INR supra therapeutic - continue to hold , dose vitamin K given drop in Hgb      Hx of PE  - coumadin as above    Severe AS  Chronic diastolic CHF  TTE 3/2018 with EF 55-60%, severe AS (area of 0.6-0.7) and mild regurgitation, moderate MR.  Is reportedly not a TAVR candidate.  Potassium has remained normal on daily replacement.  - continue PTA metolazone and torsemide - follow I/Os  - Continue scheduled daily potassium    DM II: Lantus 20 units daily with SSI,   -noted daughter prior  requests oral DM  medications only except for sliding scale.     Asthma:  Continue PTA nebs.    Recent acute blood loss anemia:  Reportedly due to supratherapeutic lovenox in setting of PAULY.  Hgb at recent discharge 8.4 with drop past 24 hr 5/30 and new hypotension with elevated INR  - concern for acute bleed   - CT ABD PELVIS  - vitamin K  - transfuse 1 u PRBC    Delirium - with noted encephalopathy -   Discharge summary states probable encephalopathy due to critical illness superimposed on some dementia.  Son denies any history of dementia, patient following commands ok   - continue PTA Seroquel  - add depakote   - dc precedex  - FUP  UA/urine culture as cause of worsening delirium    Nonhealing right leg wound  Due to hematoma with necrotic eschar now s/p debridement  - WOC consult following     Renal-  Morris discontinued per family's request.  They understand risk of skin breakdown but have concerns regarding recurrent UTI's.  Rechecked UA given delirium - FUP Culture    Dysphagia  - continue PTA tube feeds    Mild abdominal distension  -possible adynamic ileus, but tolerating tube feeds  -simethicone prn    DVT Prophylaxis: Warfarin - on hold     # Pain Assessment:  Current Pain Score 5/30/2018   Patient currently in pain? CHEMA   Pain score (0-10) -   Pain location -   Pain descriptors -   CPOT pain score 0   Jacques newell pain level was assessed and she currently denies pain.        Disposition: Expected discharge as soon as home care assistance is arranged. Will discuss temporary placement at vent facility until home     Abdoul Cardenas    Primary Care Physician   Michael Mccarthy     24 hour events: Still delirious over night, precedex gtt but dropped her pressures, required bolus fluid. INR elevated and Hgb dropped, repeat this afternoon also low - concern for hemorrhage         Chief Complaint   Lack of home resources for management    History is obtained from the patient's son (who is an anesthesiologist) and chart  "review. Recent discharge summary from [facility] said the following \"87-year-old female well-known to the critical care service with a history of some mild memory impairment, but has been essentially hospital-dependent for the last 9 months with severe aortic stenosis, not a candidate for TAVR, chronic diastolic heart failure, chronic bilateral recurrent pleural effusions, chronic vent dependence, history of pulmonary embolism, atrial fibrillation, peripheral artery disease with history of critical limb ischemia, who was transferred to [facility] from a long-term care facility. She was transferred due to acute blood loss anemia, elevated leukocytosis and acute renal failure. She was recently started on tobramycin for pseudomonas growing in her sputum.\"      History of Present Illness   Jacques Solis is a 88 year old female who presents with inadequate home support and possible acute on chronic hypoxic respiratory failure.  She has essentially been hospitalized since September 2017 when she was admitted to [facility] for critical limb ischemia and underwent balloon angioplasty which was complicated by femoral artery perforation with hemorrhagic shock.  She also had chronic respiratory and was unable to wean from the vent and was trached and pegged on 11/13/17.  She was also noted to have some encephalopathy.  Ultimately she was discharged to [facility]. She has essentially moved back and forth from [facility] and [facility] and then to [facility] and [facility] ever since.  Her most recent admission to [facility] 4/26-5/4 was due to acute blood loss anemia due to left thigh hematoma in addition to renal failure due to tobramycin toxicity for multi-drug resistant Pseudomonas.  She also underwent debridement of a right lower extremity wound due to an infected hematoma.  She was ultimately discharged to [facility], where she has stayed until discharge home on 5/18/18.  Pulmonology felt that she would not be able to wean and recommended " discharge home or to TCU and son elected for discharge home.      He reports he was not set up with anyone who would be able to manage a home vent, reports not being given detailed sliding scale insulin instructions (no dosage parameters), was unable to obtain nebs due to lack of appropriate ICD code on discharge orders, did not have supplies at home to replace clogged tube feeding piece in addition to concerns about the competency of the home nurse.  Due to these issues he presented to [facility], unclear as to why he did not return to [facility], which is very familiar with the patient's case.  He reports his mother has otherwise been stable with the exception that she experienced several acute desaturation events over the past 2 days which resolved with suctioning.  He reports about 1 month ago she was able to tolerate being off the ventilator for about 16 hours/day and was able to speak with passy wilner valve.    Son reports his goals are for his mother to be able to wean from the vent for at least a few hours and be able to speak with valve and possible eat some food by mouth.  He understands that she may never be able to fully wean from the vent and is accepting of this, however, he wants any reversible conditions treated.    Past Medical History    Paroxysmal atrial fibrillation  Chronic respiratory failure secondary to recurrent pleural effusions and deconditioning  Severe aortic stenosis  Chronic diastolic congestive heart failure  Diabetes mellitus type 2  Asthma   Dysphagia with feeding tube dependence  History of critical limb ischemia    Past Surgical History   D&C  Tracheostomy  PEG tube placement        Allergies   Allergies   Allergen Reactions     Amikacin      Amiodarone      Codeine      Dexmedetomidine      Lisinopril      Penicillins      Sulfa Drugs            Physical Exam   Temp: 97.8  F (36.6  C) Temp src: Axillary BP: 112/82   Heart Rate: 123 Resp: 15 SpO2: 100 % O2 Device: Mechanical Ventilator     Vital Signs with Ranges  Temp:  [97.5  F (36.4  C)-97.9  F (36.6  C)] 97.8  F (36.6  C)  Heart Rate:  [] 123  Resp:  [8-40] 15  BP: ()/() 112/82  FiO2 (%):  [30 %-40 %] 30 %  SpO2:  [98 %-100 %] 100 %  184 lbs 15.46 oz    Constitutional:  Elderly female appears stated aged awake and alert.  Mouths words but difficult to understand.   Eyes: pupils equal, round and reactive to light,  HEENT: , trach in place, [place]   Respiratory: lungs clear to auscultation bilaterally, synchronous with vent  Cardiovascular: RR rhythm, normal S1/S2, no murmur, rubs or gallops  GI: abdomen soft, non-tender, somewhat distended, normal bowel sounds, no hepatosplenomegaly, PEG tube in place,  Extremities : thigh edema warm well perfused  Skin: dressing over skin wound right thigh.   Neuro: intermittently following commands , moving all 4 extremities    I  Data   Data reviewed today:  I personally reviewed no images or EKG's today.    Recent Labs  Lab 05/30/18  1215 05/30/18  0410 05/30/18  0257 05/29/18  0356 05/28/18  0442   WBC 14.1*  --  10.1 15.1* 8.7   HGB 6.8* 7.2* 7.2* 10.6* 9.0*   MCV 88  --  89 89 90     --  213 298 239   INR  --   --  4.50* 3.43* 3.01*   NA  --   --  140 137 139   POTASSIUM  --   --  4.4 4.8 4.2   CHLORIDE  --   --  100 96 98   CO2  --   --  32 34* 34*   BUN  --   --  70* 67* 60*   CR  --   --  1.21* 1.30* 1.16*   ANIONGAP  --   --  8 7 7   KRISTOFER  --   --  8.0* 8.9 8.6   GLC  --   --  206* 192* 149*   ALBUMIN  --   --  2.0*  --   --    PROTTOTAL  --   --  5.6*  --   --    BILITOTAL  --   --  0.4  --   --    ALKPHOS  --   --  167*  --   --    ALT  --   --  46  --   --    AST  --   --  43  --   --        No results found for this or any previous visit (from the past 24 hour(s)).    30 min CCT

## 2018-05-31 ENCOUNTER — APPOINTMENT (OUTPATIENT)
Dept: CT IMAGING | Facility: CLINIC | Age: 83
DRG: 207 | End: 2018-05-31
Attending: INTERNAL MEDICINE
Payer: MEDICARE

## 2018-05-31 ENCOUNTER — APPOINTMENT (OUTPATIENT)
Dept: PHYSICAL THERAPY | Facility: CLINIC | Age: 83
DRG: 207 | End: 2018-05-31
Payer: MEDICARE

## 2018-05-31 LAB
ANION GAP SERPL CALCULATED.3IONS-SCNC: 4 MMOL/L (ref 3–14)
BASOPHILS # BLD AUTO: 0 10E9/L (ref 0–0.2)
BASOPHILS # BLD AUTO: 0 10E9/L (ref 0–0.2)
BASOPHILS NFR BLD AUTO: 0.1 %
BASOPHILS NFR BLD AUTO: 0.2 %
BLD PROD TYP BPU: NORMAL
BLD UNIT ID BPU: 0
BLOOD PRODUCT CODE: NORMAL
BPU ID: NORMAL
BUN SERPL-MCNC: 77 MG/DL (ref 7–30)
CA-I BLD-MCNC: 4.6 MG/DL (ref 4.4–5.2)
CALCIUM SERPL-MCNC: 8.5 MG/DL (ref 8.5–10.1)
CHLORIDE SERPL-SCNC: 101 MMOL/L (ref 94–109)
CO2 SERPL-SCNC: 34 MMOL/L (ref 20–32)
CREAT SERPL-MCNC: 1.39 MG/DL (ref 0.52–1.04)
DIFFERENTIAL METHOD BLD: ABNORMAL
DIFFERENTIAL METHOD BLD: ABNORMAL
EOSINOPHIL # BLD AUTO: 0 10E9/L (ref 0–0.7)
EOSINOPHIL # BLD AUTO: 0 10E9/L (ref 0–0.7)
EOSINOPHIL NFR BLD AUTO: 0.1 %
EOSINOPHIL NFR BLD AUTO: 0.1 %
ERYTHROCYTE [DISTWIDTH] IN BLOOD BY AUTOMATED COUNT: 17.8 % (ref 10–15)
ERYTHROCYTE [DISTWIDTH] IN BLOOD BY AUTOMATED COUNT: 17.8 % (ref 10–15)
GFR SERPL CREATININE-BSD FRML MDRD: 36 ML/MIN/1.7M2
GLUCOSE BLDC GLUCOMTR-MCNC: 141 MG/DL (ref 70–99)
GLUCOSE BLDC GLUCOMTR-MCNC: 85 MG/DL (ref 70–99)
GLUCOSE BLDC GLUCOMTR-MCNC: 87 MG/DL (ref 70–99)
GLUCOSE BLDC GLUCOMTR-MCNC: 91 MG/DL (ref 70–99)
GLUCOSE BLDC GLUCOMTR-MCNC: 93 MG/DL (ref 70–99)
GLUCOSE SERPL-MCNC: 133 MG/DL (ref 70–99)
HCT VFR BLD AUTO: 21.9 % (ref 35–47)
HCT VFR BLD AUTO: 23 % (ref 35–47)
HGB BLD-MCNC: 7.1 G/DL (ref 11.7–15.7)
HGB BLD-MCNC: 7.3 G/DL (ref 11.7–15.7)
HGB BLD-MCNC: 8.5 G/DL (ref 11.7–15.7)
IMM GRANULOCYTES # BLD: 0.1 10E9/L (ref 0–0.4)
IMM GRANULOCYTES # BLD: 0.1 10E9/L (ref 0–0.4)
IMM GRANULOCYTES NFR BLD: 0.4 %
IMM GRANULOCYTES NFR BLD: 0.6 %
INR PPP: 2.32 (ref 0.86–1.14)
INR PPP: 2.58 (ref 0.86–1.14)
LYMPHOCYTES # BLD AUTO: 1.3 10E9/L (ref 0.8–5.3)
LYMPHOCYTES # BLD AUTO: 1.4 10E9/L (ref 0.8–5.3)
LYMPHOCYTES NFR BLD AUTO: 7.9 %
LYMPHOCYTES NFR BLD AUTO: 8.3 %
MCH RBC QN AUTO: 27.8 PG (ref 26.5–33)
MCH RBC QN AUTO: 28.3 PG (ref 26.5–33)
MCHC RBC AUTO-ENTMCNC: 31.7 G/DL (ref 31.5–36.5)
MCHC RBC AUTO-ENTMCNC: 32.4 G/DL (ref 31.5–36.5)
MCV RBC AUTO: 87 FL (ref 78–100)
MCV RBC AUTO: 88 FL (ref 78–100)
MONOCYTES # BLD AUTO: 0.7 10E9/L (ref 0–1.3)
MONOCYTES # BLD AUTO: 0.7 10E9/L (ref 0–1.3)
MONOCYTES NFR BLD AUTO: 4.2 %
MONOCYTES NFR BLD AUTO: 4.4 %
NEUTROPHILS # BLD AUTO: 13.8 10E9/L (ref 1.6–8.3)
NEUTROPHILS # BLD AUTO: 14.2 10E9/L (ref 1.6–8.3)
NEUTROPHILS NFR BLD AUTO: 86.7 %
NEUTROPHILS NFR BLD AUTO: 87 %
NRBC # BLD AUTO: 0 10*3/UL
NRBC # BLD AUTO: 0 10*3/UL
NRBC BLD AUTO-RTO: 0 /100
NRBC BLD AUTO-RTO: 0 /100
PLATELET # BLD AUTO: 241 10E9/L (ref 150–450)
PLATELET # BLD AUTO: 248 10E9/L (ref 150–450)
POTASSIUM SERPL-SCNC: 5.2 MMOL/L (ref 3.4–5.3)
RBC # BLD AUTO: 2.51 10E12/L (ref 3.8–5.2)
RBC # BLD AUTO: 2.63 10E12/L (ref 3.8–5.2)
SODIUM SERPL-SCNC: 139 MMOL/L (ref 133–144)
TRANSFUSION STATUS PATIENT QL: NORMAL
TRANSFUSION STATUS PATIENT QL: NORMAL
WBC # BLD AUTO: 15.9 10E9/L (ref 4–11)
WBC # BLD AUTO: 16.4 10E9/L (ref 4–11)

## 2018-05-31 PROCEDURE — 99291 CRITICAL CARE FIRST HOUR: CPT | Performed by: INTERNAL MEDICINE

## 2018-05-31 PROCEDURE — 94640 AIRWAY INHALATION TREATMENT: CPT | Mod: 76

## 2018-05-31 PROCEDURE — 85610 PROTHROMBIN TIME: CPT | Performed by: NURSE PRACTITIONER

## 2018-05-31 PROCEDURE — 20000003 ZZH R&B ICU

## 2018-05-31 PROCEDURE — P9016 RBC LEUKOCYTES REDUCED: HCPCS | Performed by: INTERNAL MEDICINE

## 2018-05-31 PROCEDURE — A9270 NON-COVERED ITEM OR SERVICE: HCPCS | Mod: GY | Performed by: NURSE PRACTITIONER

## 2018-05-31 PROCEDURE — 25000132 ZZH RX MED GY IP 250 OP 250 PS 637: Mod: GY | Performed by: HOSPITALIST

## 2018-05-31 PROCEDURE — 94003 VENT MGMT INPAT SUBQ DAY: CPT

## 2018-05-31 PROCEDURE — 97530 THERAPEUTIC ACTIVITIES: CPT | Mod: GP | Performed by: PHYSICAL THERAPIST

## 2018-05-31 PROCEDURE — 94640 AIRWAY INHALATION TREATMENT: CPT

## 2018-05-31 PROCEDURE — 85025 COMPLETE CBC W/AUTO DIFF WBC: CPT | Performed by: INTERNAL MEDICINE

## 2018-05-31 PROCEDURE — 80048 BASIC METABOLIC PNL TOTAL CA: CPT | Performed by: INTERNAL MEDICINE

## 2018-05-31 PROCEDURE — 25000132 ZZH RX MED GY IP 250 OP 250 PS 637: Mod: GY | Performed by: INTERNAL MEDICINE

## 2018-05-31 PROCEDURE — A9270 NON-COVERED ITEM OR SERVICE: HCPCS | Mod: GY | Performed by: INTERNAL MEDICINE

## 2018-05-31 PROCEDURE — 25000132 ZZH RX MED GY IP 250 OP 250 PS 637: Mod: GY | Performed by: NURSE PRACTITIONER

## 2018-05-31 PROCEDURE — 40000239 ZZH STATISTIC VAT ROUNDS

## 2018-05-31 PROCEDURE — 25000125 ZZHC RX 250: Performed by: INTERNAL MEDICINE

## 2018-05-31 PROCEDURE — 82330 ASSAY OF CALCIUM: CPT | Performed by: INTERNAL MEDICINE

## 2018-05-31 PROCEDURE — 85610 PROTHROMBIN TIME: CPT | Performed by: INTERNAL MEDICINE

## 2018-05-31 PROCEDURE — 40000275 ZZH STATISTIC RCP TIME EA 10 MIN

## 2018-05-31 PROCEDURE — 97110 THERAPEUTIC EXERCISES: CPT | Mod: GP | Performed by: PHYSICAL THERAPIST

## 2018-05-31 PROCEDURE — 73700 CT LOWER EXTREMITY W/O DYE: CPT | Mod: 50

## 2018-05-31 PROCEDURE — 40000193 ZZH STATISTIC PT WARD VISIT: Performed by: PHYSICAL THERAPIST

## 2018-05-31 PROCEDURE — A9270 NON-COVERED ITEM OR SERVICE: HCPCS | Mod: GY | Performed by: HOSPITALIST

## 2018-05-31 PROCEDURE — 25000128 H RX IP 250 OP 636: Performed by: NURSE PRACTITIONER

## 2018-05-31 PROCEDURE — 85018 HEMOGLOBIN: CPT | Performed by: NURSE PRACTITIONER

## 2018-05-31 PROCEDURE — 25000131 ZZH RX MED GY IP 250 OP 636 PS 637: Mod: GY | Performed by: NURSE PRACTITIONER

## 2018-05-31 PROCEDURE — 00000146 ZZHCL STATISTIC GLUCOSE BY METER IP

## 2018-05-31 PROCEDURE — 25000128 H RX IP 250 OP 636: Performed by: INTERNAL MEDICINE

## 2018-05-31 PROCEDURE — 40000008 ZZH STATISTIC AIRWAY CARE

## 2018-05-31 RX ORDER — LANOLIN ALCOHOL/MO/W.PET/CERES
6 CREAM (GRAM) TOPICAL EVERY 24 HOURS
Status: DISCONTINUED | OUTPATIENT
Start: 2018-05-31 | End: 2018-06-14 | Stop reason: HOSPADM

## 2018-05-31 RX ORDER — MIRTAZAPINE 7.5 MG/1
30 TABLET, FILM COATED ORAL AT BEDTIME
Status: DISCONTINUED | OUTPATIENT
Start: 2018-05-31 | End: 2018-06-14 | Stop reason: HOSPADM

## 2018-05-31 RX ADMIN — IPRATROPIUM BROMIDE AND ALBUTEROL SULFATE 3 ML: .5; 3 SOLUTION RESPIRATORY (INHALATION) at 16:29

## 2018-05-31 RX ADMIN — METOPROLOL TARTRATE 5 MG: 5 INJECTION, SOLUTION INTRAVENOUS at 00:18

## 2018-05-31 RX ADMIN — Medication 1 CAPSULE: at 22:07

## 2018-05-31 RX ADMIN — INSULIN ASPART 1 UNITS: 100 INJECTION, SOLUTION INTRAVENOUS; SUBCUTANEOUS at 05:05

## 2018-05-31 RX ADMIN — MIRTAZAPINE 30 MG: 7.5 TABLET ORAL at 22:06

## 2018-05-31 RX ADMIN — METOPROLOL TARTRATE 5 MG: 5 INJECTION, SOLUTION INTRAVENOUS at 12:00

## 2018-05-31 RX ADMIN — MELATONIN 6 MG: 3 TAB ORAL at 19:29

## 2018-05-31 RX ADMIN — INSULIN GLARGINE 20 UNITS: 100 INJECTION, SOLUTION SUBCUTANEOUS at 22:07

## 2018-05-31 RX ADMIN — HYDROMORPHONE HYDROCHLORIDE 0.2 MG: 1 INJECTION, SOLUTION INTRAMUSCULAR; INTRAVENOUS; SUBCUTANEOUS at 01:03

## 2018-05-31 RX ADMIN — PHYTONADIONE 2 MG: 2 INJECTION, EMULSION INTRAMUSCULAR; INTRAVENOUS; SUBCUTANEOUS at 16:38

## 2018-05-31 RX ADMIN — VALPROIC ACID 125 MG: 250 SOLUTION ORAL at 11:56

## 2018-05-31 RX ADMIN — METOPROLOL TARTRATE 50 MG: 50 TABLET, FILM COATED ORAL at 15:29

## 2018-05-31 RX ADMIN — Medication 1 CAPSULE: at 08:38

## 2018-05-31 RX ADMIN — Medication 6.25 MG: at 19:29

## 2018-05-31 RX ADMIN — TORSEMIDE 10 MG: 10 TABLET ORAL at 09:59

## 2018-05-31 RX ADMIN — VALPROIC ACID 125 MG: 250 SOLUTION ORAL at 05:05

## 2018-05-31 RX ADMIN — IPRATROPIUM BROMIDE AND ALBUTEROL SULFATE 3 ML: .5; 3 SOLUTION RESPIRATORY (INHALATION) at 08:16

## 2018-05-31 RX ADMIN — METOPROLOL TARTRATE 50 MG: 50 TABLET, FILM COATED ORAL at 08:40

## 2018-05-31 RX ADMIN — FLUTICASONE PROPIONATE 1 SPRAY: 50 SPRAY, METERED NASAL at 08:40

## 2018-05-31 RX ADMIN — TORSEMIDE 10 MG: 10 TABLET ORAL at 16:18

## 2018-05-31 RX ADMIN — VALPROIC ACID 125 MG: 250 SOLUTION ORAL at 19:29

## 2018-05-31 NOTE — PROGRESS NOTES
CM    I:  SW consult pending; Awaiting therapy recommendations, however, patient may discharge with Home Care which is being arranged by RN CC.     P:  Continue to follow/assist as needed.    CAMMY Ambrocio

## 2018-05-31 NOTE — PLAN OF CARE
Problem: Patient Care Overview  Goal: Plan of Care/Patient Progress Review  Discharge Planner PT   Patient plan for discharge: Unable to state. Patient intubated.   Current status: Patient supine when therapist arrived. Attempted active assisted ROM but minimal active participation with UE's or LE's. Patient grimacing in pain with attempted hip/knee flexion on the right. Right thigh very swollen and tight. Nurse aware. Patient tends to resist movement and needed multiple cues to relax and allow full elbow extension bilaterally. Transferred to chair using overhead lift with assist of 2.   Barriers to return to prior living situation: Patient with minimal alertness, needs assist of 2 for all mobility.   Recommendations for discharge: TCU  Rationale for recommendations: Recommend TCU for continued improvement of functional ability and vitals as it sounds like family might not have the essential resources at home to properly care for pt. Will hopefully be able to return home with children in future with appropriate resources/education.

## 2018-05-31 NOTE — PROGRESS NOTES
[facility] ICU RESPIRATORY NOTE  Date of Admission: 5/21/2018  Date of Intubation (most recent):  5/21/18  Reason for Mechanical Ventilation:  Resp failure  Number of Days on Mechanical Ventilation:  10  Met Criteria for Pressure Support Trial:  Yes   Lengthy from 1030  to 1500. Returned to full support for CT run.      of Pressure Support Trial: 5 hrs.     Ventilation Mode: CMV/AC  (Continuous Mandatory Ventilation/ Assist Control)  FiO2 (%): 40 %  Rate Set (breaths/minute): 16 breaths/min  Tidal Volume Set (mL): 360 mL  PEEP (cm H2O): 5 cmH2O  Pressure Support (cm H2O): 12 cmH2O Peep 5  Oxygen Concentration (%): 30 %  Resp: 17    Plan:     5/31/2018 Nino Pedraza RRT

## 2018-05-31 NOTE — PROGRESS NOTES
Care Coordination:    Received more items to order per son, Smith. He would like an Air mattress for patient, preferrably the Drive air mattress. Contacted [facility] to place order because they have the medicare bid for bed products. Faxed over order and recent wound care note.    Judie Lloyd RN BSN  [facility] Care Coordinator  Phone [phone]

## 2018-05-31 NOTE — PROGRESS NOTES
Care Coordination:    Faxed info to [facility] for INR machine.    Judie Lloyd RN BSN  [facility] Care Coordinator  Phone [phone]

## 2018-05-31 NOTE — PROGRESS NOTES
ICU Update    Raised  reservations with family monitoring of INR and concern over potential for error and signing for home INR machine.  Discussed with patient daughter as well son Dr Irma MD - who is an anesthesiologist who mentioned and reassured me that it would be he alone or trained home health aide measuring INRs.      Additionally discussed risk benefit of continued AC given prior history of bleeds and now acute blood loss anemia. Son did mention had prior thigh bleed - will order CT thigh to evaluate.     Abdoul Cardenas

## 2018-05-31 NOTE — PROGRESS NOTES
Care Coordination:    Gave info for [employer] ordering for Jen and Alicia, patient's children to review. It is not covered by insurance. $192 for a case, machine/suction is $330. Per [facility].    Judie Lloyd RN BSN  [facility] Care Coordinator  Phone [phone]

## 2018-05-31 NOTE — PROGRESS NOTES
SPIRITUAL HEALTH SERVICES  Spiritual Assessment Progress Note  [facility] ICU   introduced self to pt's daughter.  We had a brief interaction as daughter was leaving.  Pt was asleep.     Daughter named that pt was preparing to bring pt home.    Pt is Church and they have the support of their Rabbi.  No other needs at this time.       listened and provided support.      SH continues to be available as needed.                                                                                                                                             Roxanna Paige M.Div., [facility]  Staff    Pager [phone]

## 2018-05-31 NOTE — PLAN OF CARE
Problem: Patient Care Overview  Goal: Plan of Care/Patient Progress Review  SLP: Attempted to see patient for [place] Valve trial. Patient was on PS with stable vitals. Family present and son had many questions regarding the valve, eating/drinking and ventilator settings when on the speaking valve. Unable to provide treatment today secondary to heading down for a CT scan. Will reschedule for 6/1/18 at 9-9:15. Family will be present.

## 2018-05-31 NOTE — PROGRESS NOTES
[facility]    Daily Progress Note  [facility]       Date of Admission:  5/21/2018    Main Plan for day  1. PS trials  2. Increase remeron  3 Serial CBC   4. FUP urine cultures ID     Assessment & Plan   Jacques Solis is a 88 year old female with past history A-fib, CHF, severe AS, DM II, asthma, chronic respiratory failure with vent dependence who was discharged home from [facility] on 5/18/18 but did not have appropriate home services arranged, but son also reporting a recent decline in respiratory status.     Acute on chronic hypoxic respiratory failure with vent dependence:  Chronic failure felt due to pleural effusions and overall debility. Pulmonologist at [facility] felt she would chronically be vent dependent and weaning efforts were stopped.  CT chest in ED shows small to moderated loculated bilateral pleural effusions with atelectasis.  Also a 4 cm gil opacity in left upper lobe, but no SIRS criteria present.  Tolerating moderate levels of PS wean for longer periods of time, will continue.    Ventilator status stable.  Tolerating PS 12 and has -270.  Will trial lower PS 10 - suspect part of restriction from kyphosis   - No acute issues, so will work to arrange discharge to home. Social work and care coordination involved    Paroxysmal A-fib:   Intermittent rapid - which is evolving from NSR   - continue PTA metoprolol (note son has been dosing this at 25 mg q8h) - required intermittent IV dose 5/30  - INR supra therapeutic - continue to hold ,       Hx of PE  - coumadin as above    Severe AS  Chronic diastolic CHF  TTE 3/2018 with EF 55-60%, severe AS (area of 0.6-0.7) and mild regurgitation, moderate MR.  Is reportedly not a TAVR candidate.  Potassium has remained normal on daily replacement.  - continue PTA metolazone and torsemide - follow I/Os  - Continue scheduled daily potassium when on diuretics    DM II: Lantus 20 units daily with SSI,   -noted daughter prior   requests oral DM medications only except for sliding scale.     Asthma:  Continue PTA nebs.    Recent acute blood loss anemia:  Reportedly due to supratherapeutic lovenox in setting of PAULY.  Hgb at recent discharge 8.4 with drop past 24 hr 5/30 and new hypotension with elevated INR -  - serial CBC  - follow INR      Delirium - with noted encephalopathy -   Discharge summary states probable encephalopathy due to critical illness superimposed on some dementia.  Son denies any history of dementia, patient following commands ok   - continue PTA Seroquel  - increase remeron  - continue depakote (seems to have a positive effect)  - FUP  UA/urine culture as cause of worsening delirium    Nonhealing right leg wound  Due to hematoma with necrotic eschar now s/p debridement  - WOC consult following     Renal-  Morris discontinued per family's request.  They understand risk of skin breakdown but have concerns regarding recurrent UTI's.  Rechecked UA given delirium - FUP Culture with mixed GPC and GNR but <100K CFU suggestive of contamination    Dysphagia  - continue PTA tube feeds    Mild abdominal distension  -possible adynamic ileus, (chronic per family)  tube feeds for emesis 5/30 - restarting today   -simethicone prn    DVT Prophylaxis: Warfarin - on hold     # Pain Assessment:  Current Pain Score 5/31/2018   Patient currently in pain? CHEMA   Pain score (0-10) -   Pain location -   Pain descriptors -   CPOT pain score 0   Jacques newell pain level was assessed and she currently denies pain.        Disposition: Expected discharge as soon as home care assistance is arranged. Will discuss temporary placement at vent facility until home     Abdoul Cardenas    Primary Care Physician   Michael Mccarthy     24 hour events: Hgb drop and tachycardia - CT scan obtained and negative for occult bleed.  INR corrected and transfused with appropriate response. No /sx of active bleed.   Less agitated with depakene        Chief Complaint  "  Lack of home resources for management    History is obtained from the patient's son (who is an anesthesiologist) and chart review. Recent discharge summary from [facility] said the following \"87-year-old female well-known to the critical care service with a history of some mild memory impairment, but has been essentially hospital-dependent for the last 9 months with severe aortic stenosis, not a candidate for TAVR, chronic diastolic heart failure, chronic bilateral recurrent pleural effusions, chronic vent dependence, history of pulmonary embolism, atrial fibrillation, peripheral artery disease with history of critical limb ischemia, who was transferred to [facility] from a long-term care facility. She was transferred due to acute blood loss anemia, elevated leukocytosis and acute renal failure. She was recently started on tobramycin for pseudomonas growing in her sputum.\"      History of Present Illness   Jacques Solis is a 88 year old female who presents with inadequate home support and possible acute on chronic hypoxic respiratory failure.  She has essentially been hospitalized since September 2017 when she was admitted to [facility] for critical limb ischemia and underwent balloon angioplasty which was complicated by femoral artery perforation with hemorrhagic shock.  She also had chronic respiratory and was unable to wean from the vent and was trached and pegged on 11/13/17.  She was also noted to have some encephalopathy.  Ultimately she was discharged to [place]. She has essentially moved back and forth from [place] and [facility] and then to [facility] and [facility] ever since.  Her most recent admission to [facility] 4/26-5/4 was due to acute blood loss anemia due to left thigh hematoma in addition to renal failure due to tobramycin toxicity for multi-drug resistant Pseudomonas.  She also underwent debridement of a right lower extremity wound due to an infected hematoma.  She was ultimately discharged to [facility], " where she has stayed until discharge home on 5/18/18.  Pulmonology felt that she would not be able to wean and recommended discharge home or to TCU and son elected for discharge home.      He reports he was not set up with anyone who would be able to manage a home vent, reports not being given detailed sliding scale insulin instructions (no dosage parameters), was unable to obtain nebs due to lack of appropriate ICD code on discharge orders, did not have supplies at home to replace clogged tube feeding piece in addition to concerns about the competency of the home nurse.  Due to these issues he presented to [facility], unclear as to why he did not return to [facility], which is very familiar with the patient's case.  He reports his mother has otherwise been stable with the exception that she experienced several acute desaturation events over the past 2 days which resolved with suctioning.  He reports about 1 month ago she was able to tolerate being off the ventilator for about 16 hours/day and was able to speak with passy [place]ner valve.    Son reports his goals are for his mother to be able to wean from the vent for at least a few hours and be able to speak with valve and possible eat some food by mouth.  He understands that she may never be able to fully wean from the vent and is accepting of this, however, he wants any reversible conditions treated.    Past Medical History    Paroxysmal atrial fibrillation  Chronic respiratory failure secondary to recurrent pleural effusions and deconditioning  Severe aortic stenosis  Chronic diastolic congestive heart failure  Diabetes mellitus type 2  Asthma   Dysphagia with feeding tube dependence  History of critical limb ischemia  Delirium    Past Surgical History   D&C  Tracheostomy  PEG tube placement        Allergies   Allergies   Allergen Reactions     Amikacin      Amiodarone      Codeine      Dexmedetomidine      Lisinopril      Penicillins      Sulfa Drugs            Physical  Exam   Temp: (P) 98  F (36.7  C) Temp src: (P) Axillary BP: 94/75   Heart Rate: 98 Resp: 17 SpO2: 100 % O2 Device: Mechanical Ventilator    Vital Signs with Ranges  Temp:  [97.2  F (36.2  C)-98.2  F (36.8  C)] (P) 98  F (36.7  C)  Heart Rate:  [] 98  Resp:  [8-29] 17  BP: ()/() 94/75  FiO2 (%):  [30 %] 30 %  SpO2:  [89 %-100 %] 100 %  184 lbs 15.46 oz    Constitutional:  Elderly female appears stated aged waxing waning status   Eyes: pupils equal, round and reactive to light,  HEENT: , trach in place, [place]   Respiratory: lungs clear to auscultation bilaterally, synchronous with vent  Cardiovascular: RR rhythm, normal S1/S2, no murmur, rubs or gallops  GI: abdomen soft, non-tender, somewhat distended, normal bowel sounds, no hepatosplenomegaly, PEG tube in place,  Extremities : thigh edema warm well perfused  Skin: dressing over skin wound right thigh.   Neuro: intermittently following commands , moving all 4 extremities    I  Data   Data reviewed today:  I personally reviewed no images or EKG's today.    Recent Labs  Lab 05/31/18  0502 05/30/18 2000 05/30/18  1811 05/30/18  1215  05/30/18  0257   WBC 15.9*  --  14.6* 14.1*  --  10.1   HGB 7.3*  --  8.3* 6.8*  < > 7.2*   MCV 88  --  88 88  --  89     --  269 260  --  213   INR 2.58*  --  2.89*  --   --  4.50*    139  --   --   --  140   POTASSIUM 5.2 4.9  --   --   --  4.4   CHLORIDE 101 100  --   --   --  100   CO2 34* 33*  --   --   --  32   BUN 77* 70*  --   --   --  70*   CR 1.39* 1.29*  --   --   --  1.21*   ANIONGAP 4 6  --   --   --  8   KRISTOFER 8.5 8.4*  --   --   --  8.0*   * 163*  --   --   --  206*   ALBUMIN  --  2.2*  --   --   --  2.0*   PROTTOTAL  --  5.9*  --   --   --  5.6*   BILITOTAL  --  0.5  --   --   --  0.4   ALKPHOS  --  148  --   --   --  167*   ALT  --  42  --   --   --  46   AST  --  40  --   --   --  43   < > = values in this interval not displayed.    Recent Results (from the past 24 hour(s))   CT  Abdomen Pelvis w/o Contrast    Narrative    CT ABDOMEN AND PELVIS WITHOUT CONTRAST   5/30/2018 2:31 PM     HISTORY: Acute blood loss anemia.    TECHNIQUE:   No IV contrast material. Radiation dose for this scan was  reduced using automated exposure control, adjustment of the mA and/or  kV according to patient size, or iterative reconstruction technique.    COMPARISON: None.    FINDINGS: There are small to moderate-sized bilateral pleural  effusions. No ascites. No free intraperitoneal air or bowel  obstruction. Gastrostomy tube is noted. There are calcified  gallstones. Within the limits of an unenhanced study, the liver,  pancreas, spleen, and right adrenal gland are unremarkable. There is  nodular thickening of the left adrenal gland, measuring up to 1.5 cm.  Density of this nodular thickening compatible with adenoma. There is  no hydronephrosis. Small left renal cysts are noted. Dense coronary  and aortic atherosclerosis is present. No abdominal aortic aneurysm.  No retroperitoneal hematoma. Nodular densities in the subcutaneous fat  of the lower anterior abdominal wall are likely related to  subcutaneous injections. No abdominal or pelvic adenopathy. The bones  are osteopenic.      Impression    IMPRESSION:  1. No cause for acute blood loss anemia demonstrated.  2. There are small to moderate-sized bilateral pleural effusions.  3. Cholelithiasis.  4. Severe atherosclerosis.  5. Left adrenal adenoma.    KIMMIE REHMAN MD       30 min CCT

## 2018-06-01 LAB
ABO + RH BLD: NORMAL
ABO + RH BLD: NORMAL
ALBUMIN UR-MCNC: 100 MG/DL
ANION GAP SERPL CALCULATED.3IONS-SCNC: 10 MMOL/L (ref 3–14)
ANION GAP SERPL CALCULATED.3IONS-SCNC: 8 MMOL/L (ref 3–14)
APPEARANCE UR: ABNORMAL
BACTERIA #/AREA URNS HPF: ABNORMAL /HPF
BILIRUB UR QL STRIP: NEGATIVE
BLD GP AB SCN SERPL QL: NORMAL
BLD PROD TYP BPU: NORMAL
BLD PROD TYP BPU: NORMAL
BLD UNIT ID BPU: 0
BLOOD BANK CMNT PATIENT-IMP: NORMAL
BLOOD PRODUCT CODE: NORMAL
BPU ID: NORMAL
BUN SERPL-MCNC: 81 MG/DL (ref 7–30)
BUN SERPL-MCNC: 81 MG/DL (ref 7–30)
CALCIUM SERPL-MCNC: 8.2 MG/DL (ref 8.5–10.1)
CALCIUM SERPL-MCNC: 8.4 MG/DL (ref 8.5–10.1)
CHLORIDE SERPL-SCNC: 96 MMOL/L (ref 94–109)
CHLORIDE SERPL-SCNC: 97 MMOL/L (ref 94–109)
CO2 SERPL-SCNC: 30 MMOL/L (ref 20–32)
CO2 SERPL-SCNC: 32 MMOL/L (ref 20–32)
COLOR UR AUTO: YELLOW
CREAT SERPL-MCNC: 1.69 MG/DL (ref 0.52–1.04)
CREAT SERPL-MCNC: 1.77 MG/DL (ref 0.52–1.04)
ERYTHROCYTE [DISTWIDTH] IN BLOOD BY AUTOMATED COUNT: 16.7 % (ref 10–15)
ERYTHROCYTE [DISTWIDTH] IN BLOOD BY AUTOMATED COUNT: 17.5 % (ref 10–15)
GFR SERPL CREATININE-BSD FRML MDRD: 27 ML/MIN/1.7M2
GFR SERPL CREATININE-BSD FRML MDRD: 29 ML/MIN/1.7M2
GLUCOSE BLDC GLUCOMTR-MCNC: 120 MG/DL (ref 70–99)
GLUCOSE BLDC GLUCOMTR-MCNC: 143 MG/DL (ref 70–99)
GLUCOSE BLDC GLUCOMTR-MCNC: 150 MG/DL (ref 70–99)
GLUCOSE BLDC GLUCOMTR-MCNC: 164 MG/DL (ref 70–99)
GLUCOSE BLDC GLUCOMTR-MCNC: 165 MG/DL (ref 70–99)
GLUCOSE BLDC GLUCOMTR-MCNC: 177 MG/DL (ref 70–99)
GLUCOSE SERPL-MCNC: 143 MG/DL (ref 70–99)
GLUCOSE SERPL-MCNC: 186 MG/DL (ref 70–99)
GLUCOSE UR STRIP-MCNC: NEGATIVE MG/DL
HCT VFR BLD AUTO: 23.6 % (ref 35–47)
HCT VFR BLD AUTO: 27.3 % (ref 35–47)
HGB BLD-MCNC: 7.6 G/DL (ref 11.7–15.7)
HGB BLD-MCNC: 9 G/DL (ref 11.7–15.7)
HGB UR QL STRIP: ABNORMAL
INR PPP: 2.44 (ref 0.86–1.14)
KETONES UR STRIP-MCNC: NEGATIVE MG/DL
LEUKOCYTE ESTERASE UR QL STRIP: ABNORMAL
MCH RBC QN AUTO: 28.5 PG (ref 26.5–33)
MCH RBC QN AUTO: 28.9 PG (ref 26.5–33)
MCHC RBC AUTO-ENTMCNC: 32.2 G/DL (ref 31.5–36.5)
MCHC RBC AUTO-ENTMCNC: 33 G/DL (ref 31.5–36.5)
MCV RBC AUTO: 88 FL (ref 78–100)
MCV RBC AUTO: 88 FL (ref 78–100)
NITRATE UR QL: NEGATIVE
NUM BPU REQUESTED: 3
PH UR STRIP: 5.5 PH (ref 5–7)
PLATELET # BLD AUTO: 188 10E9/L (ref 150–450)
PLATELET # BLD AUTO: 208 10E9/L (ref 150–450)
POTASSIUM SERPL-SCNC: 5 MMOL/L (ref 3.4–5.3)
POTASSIUM SERPL-SCNC: 5.5 MMOL/L (ref 3.4–5.3)
RBC # BLD AUTO: 2.67 10E12/L (ref 3.8–5.2)
RBC # BLD AUTO: 3.11 10E12/L (ref 3.8–5.2)
RBC #/AREA URNS AUTO: 21 /HPF (ref 0–2)
SODIUM SERPL-SCNC: 136 MMOL/L (ref 133–144)
SODIUM SERPL-SCNC: 137 MMOL/L (ref 133–144)
SOURCE: ABNORMAL
SP GR UR STRIP: 1.02 (ref 1–1.03)
SPECIMEN EXP DATE BLD: NORMAL
TRANSFUSION STATUS PATIENT QL: NORMAL
TRANSFUSION STATUS PATIENT QL: NORMAL
UROBILINOGEN UR STRIP-MCNC: NORMAL MG/DL (ref 0–2)
WBC # BLD AUTO: 11 10E9/L (ref 4–11)
WBC # BLD AUTO: 13.3 10E9/L (ref 4–11)
WBC #/AREA URNS AUTO: >182 /HPF (ref 0–5)
WBC CLUMPS #/AREA URNS HPF: PRESENT /HPF

## 2018-06-01 PROCEDURE — A9270 NON-COVERED ITEM OR SERVICE: HCPCS | Mod: GY | Performed by: NURSE PRACTITIONER

## 2018-06-01 PROCEDURE — 40000008 ZZH STATISTIC AIRWAY CARE

## 2018-06-01 PROCEDURE — 25000132 ZZH RX MED GY IP 250 OP 250 PS 637: Mod: GY | Performed by: INTERNAL MEDICINE

## 2018-06-01 PROCEDURE — 94640 AIRWAY INHALATION TREATMENT: CPT | Mod: 76

## 2018-06-01 PROCEDURE — A9270 NON-COVERED ITEM OR SERVICE: HCPCS | Mod: GY | Performed by: INTERNAL MEDICINE

## 2018-06-01 PROCEDURE — 85027 COMPLETE CBC AUTOMATED: CPT | Performed by: HOSPITALIST

## 2018-06-01 PROCEDURE — P9016 RBC LEUKOCYTES REDUCED: HCPCS | Performed by: INTERNAL MEDICINE

## 2018-06-01 PROCEDURE — 40000275 ZZH STATISTIC RCP TIME EA 10 MIN

## 2018-06-01 PROCEDURE — 85610 PROTHROMBIN TIME: CPT | Performed by: HOSPITALIST

## 2018-06-01 PROCEDURE — 80048 BASIC METABOLIC PNL TOTAL CA: CPT | Performed by: INTERNAL MEDICINE

## 2018-06-01 PROCEDURE — 99291 CRITICAL CARE FIRST HOUR: CPT | Performed by: INTERNAL MEDICINE

## 2018-06-01 PROCEDURE — 94003 VENT MGMT INPAT SUBQ DAY: CPT

## 2018-06-01 PROCEDURE — 25000125 ZZHC RX 250: Performed by: INTERNAL MEDICINE

## 2018-06-01 PROCEDURE — 80048 BASIC METABOLIC PNL TOTAL CA: CPT | Performed by: HOSPITALIST

## 2018-06-01 PROCEDURE — 87186 SC STD MICRODIL/AGAR DIL: CPT | Performed by: HOSPITALIST

## 2018-06-01 PROCEDURE — 20000003 ZZH R&B ICU

## 2018-06-01 PROCEDURE — 87086 URINE CULTURE/COLONY COUNT: CPT | Performed by: HOSPITALIST

## 2018-06-01 PROCEDURE — 27210432 ZZH NUTRITION PRODUCT RENAL BASIC LITER

## 2018-06-01 PROCEDURE — 87088 URINE BACTERIA CULTURE: CPT | Performed by: HOSPITALIST

## 2018-06-01 PROCEDURE — 81001 URINALYSIS AUTO W/SCOPE: CPT | Performed by: INTERNAL MEDICINE

## 2018-06-01 PROCEDURE — 85027 COMPLETE CBC AUTOMATED: CPT | Performed by: INTERNAL MEDICINE

## 2018-06-01 PROCEDURE — 94640 AIRWAY INHALATION TREATMENT: CPT

## 2018-06-01 PROCEDURE — 40000239 ZZH STATISTIC VAT ROUNDS

## 2018-06-01 PROCEDURE — 25000132 ZZH RX MED GY IP 250 OP 250 PS 637: Mod: GY | Performed by: HOSPITALIST

## 2018-06-01 PROCEDURE — 25000131 ZZH RX MED GY IP 250 OP 636 PS 637: Mod: GY | Performed by: NURSE PRACTITIONER

## 2018-06-01 PROCEDURE — 00000146 ZZHCL STATISTIC GLUCOSE BY METER IP

## 2018-06-01 PROCEDURE — A9270 NON-COVERED ITEM OR SERVICE: HCPCS | Mod: GY | Performed by: HOSPITALIST

## 2018-06-01 PROCEDURE — 25000132 ZZH RX MED GY IP 250 OP 250 PS 637: Mod: GY | Performed by: NURSE PRACTITIONER

## 2018-06-01 PROCEDURE — 25000128 H RX IP 250 OP 636: Performed by: NURSE PRACTITIONER

## 2018-06-01 RX ORDER — PHYTONADIONE 1 MG/.5ML
1 INJECTION, EMULSION INTRAMUSCULAR; INTRAVENOUS; SUBCUTANEOUS ONCE
Status: DISCONTINUED | OUTPATIENT
Start: 2018-06-01 | End: 2018-06-01

## 2018-06-01 RX ORDER — TORSEMIDE 10 MG/1
10 TABLET ORAL DAILY
Status: DISCONTINUED | OUTPATIENT
Start: 2018-06-01 | End: 2018-06-04

## 2018-06-01 RX ADMIN — Medication 1 CAPSULE: at 08:24

## 2018-06-01 RX ADMIN — RANITIDINE HYDROCHLORIDE 150 MG: 150 SOLUTION ORAL at 11:52

## 2018-06-01 RX ADMIN — INSULIN ASPART 1 UNITS: 100 INJECTION, SOLUTION INTRAVENOUS; SUBCUTANEOUS at 05:06

## 2018-06-01 RX ADMIN — FLUTICASONE PROPIONATE 1 SPRAY: 50 SPRAY, METERED NASAL at 08:24

## 2018-06-01 RX ADMIN — INSULIN ASPART 1 UNITS: 100 INJECTION, SOLUTION INTRAVENOUS; SUBCUTANEOUS at 20:06

## 2018-06-01 RX ADMIN — Medication 12.5 MG: at 11:52

## 2018-06-01 RX ADMIN — MELATONIN 6 MG: 3 TAB ORAL at 20:14

## 2018-06-01 RX ADMIN — INSULIN ASPART 1 UNITS: 100 INJECTION, SOLUTION INTRAVENOUS; SUBCUTANEOUS at 12:04

## 2018-06-01 RX ADMIN — IPRATROPIUM BROMIDE AND ALBUTEROL SULFATE 3 ML: .5; 3 SOLUTION RESPIRATORY (INHALATION) at 12:00

## 2018-06-01 RX ADMIN — IPRATROPIUM BROMIDE AND ALBUTEROL SULFATE 3 ML: .5; 3 SOLUTION RESPIRATORY (INHALATION) at 07:06

## 2018-06-01 RX ADMIN — Medication 1 CAPSULE: at 20:14

## 2018-06-01 RX ADMIN — Medication 12.5 MG: at 23:44

## 2018-06-01 RX ADMIN — Medication 6.25 MG: at 02:51

## 2018-06-01 RX ADMIN — PHYTONADIONE 5 MG: 10 INJECTION, EMULSION INTRAMUSCULAR; INTRAVENOUS; SUBCUTANEOUS at 11:53

## 2018-06-01 RX ADMIN — INSULIN ASPART 1 UNITS: 100 INJECTION, SOLUTION INTRAVENOUS; SUBCUTANEOUS at 16:22

## 2018-06-01 RX ADMIN — VALPROIC ACID 125 MG: 250 SOLUTION ORAL at 04:54

## 2018-06-01 RX ADMIN — METOPROLOL TARTRATE 50 MG: 50 TABLET, FILM COATED ORAL at 00:42

## 2018-06-01 RX ADMIN — TORSEMIDE 10 MG: 10 TABLET ORAL at 10:18

## 2018-06-01 RX ADMIN — IPRATROPIUM BROMIDE AND ALBUTEROL SULFATE 3 ML: .5; 3 SOLUTION RESPIRATORY (INHALATION) at 19:31

## 2018-06-01 RX ADMIN — MIRTAZAPINE 30 MG: 7.5 TABLET ORAL at 21:14

## 2018-06-01 RX ADMIN — INSULIN GLARGINE 20 UNITS: 100 INJECTION, SOLUTION SUBCUTANEOUS at 21:14

## 2018-06-01 RX ADMIN — INSULIN ASPART 1 UNITS: 100 INJECTION, SOLUTION INTRAVENOUS; SUBCUTANEOUS at 08:24

## 2018-06-01 NOTE — PROGRESS NOTES
[facility]    Daily Progress Note  [facility]       Date of Admission:  5/21/2018    Main Plan for day  1. Transfuse 1 uPRBC, vitamin k   2. Serial CBC, coags   3. Adjust sedation   4. Supportive cares    Assessment & Plan   Jacques Solis is a 88 year old female with past history A-fib, CHF, severe AS, DM II, asthma, chronic respiratory failure with vent dependence who was discharged home from [facility] on 5/18/18 but did not have appropriate home services arranged, but son also reporting a recent decline in respiratory status.     Acute on chronic hypoxic respiratory failure with vent dependence:  Chronic failure felt due to pleural effusions and overall debility. Pulmonologist at [facility] felt she would chronically be vent dependent and weaning efforts were stopped.  CT chest in ED shows small to moderated loculated bilateral pleural effusions with atelectasis.  Also a 4 cm gil opacity in left upper lobe, but no SIRS criteria present.  Tolerating moderate levels of PS wean for longer periods of time, will continue.    Ventilator status stable.  Tolerating PS 12 and has -270.  Will trial lower PS 10 - suspect part of restriction from kyphosis   - No acute issues, so will work to arrange discharge to home. Social work and care coordination involved    Paroxysmal A-fib:   Intermittent rapid - which is evolving from NSR   - continue PTA metoprolol (note son has been dosing this at 25 mg q8h) - required intermittent IV dose 5/30  - AC on hold      Hx of Arterial Clot (LE) and Afib  - coumadin on hold   - benefit becoming less favorable given recurrent bleed - discussed with family    Severe AS  Chronic diastolic CHF  TTE 3/2018 with EF 55-60%, severe AS (area of 0.6-0.7) and mild regurgitation, moderate MR.  Is reportedly not a TAVR candidate.  Potassium has remained normal on daily replacement.  - continue torsemide decrease dose to daily   - follow I/Os keep even  -  disccontinue scheduled daily potassium     DM II: Lantus 20 units daily with SSI,   -noted daughter prior  requests oral DM medications only except for sliding scale.     Asthma:  Continue PTA nebs.    Recent acute blood loss anemia with thigh hematomas:  INR mildly elevated at 4 but prior Reportedly due to supratherapeutic lovenox in setting of PAULY.  Hgb ~ 8.4 with drop 5/29 with agitation s/p 2 u PRBC, 3rd ordered this AM   - vitamin K pending response may need to be more aggressive  - serial CBC  - hold AC , follow INR      Delirium - with noted encephalopathy -   Discharge summary states probable encephalopathy due to critical illness superimposed on some dementia.  Son denies any history of dementia, patient following commands ok   - continue PTA Seroquel PRN - increase dose to 12.5   -  remeron 30 mg  - decrease depakote to qhs  -    Nonhealing right leg wound  Due to hematoma with necrotic eschar now s/p debridement  - WOC consult following     Renal-  Non oliguric PAULY  - Cr rise times with acute bleed with hypotension   Morris removed (discontinued per family's request)  They understand risk of skin breakdown but have concerns regarding recurrent UTI's.  Rechecked UA given delirium - FUP Culture with mixed GPC and GNR but <100K CFU suggestive of contamination - - follow Cr, UOP, maintain hemodynamics     Dysphagia  - continue PTA tube feeds, currently at goal     Mild abdominal distension  -possible adynamic ileus, (chronic per family)  tube feeds for emesis 5/30 - tolerating thus far    -simethicone prn    DVT Prophylaxis: Warfarin - on hold         Disposition: Expected discharge as soon as acute issues stabilized     Abdoul Cardenas    Primary Care Physician   Michael Mccarthy     24 hour events:  Continued downtrend in HGB - thigh CT obtained with evolving hematomas - thus likely source. Transfused again. Family updated.  Ongoing discussion re risk benefit of AC       Chief Complaint   Lack of  "home resources for management    History is obtained from the patient's son (who is an anesthesiologist) and chart review. Recent discharge summary from [facility] said the following \"87-year-old female well-known to the critical care service with a history of some mild memory impairment, but has been essentially hospital-dependent for the last 9 months with severe aortic stenosis, not a candidate for TAVR, chronic diastolic heart failure, chronic bilateral recurrent pleural effusions, chronic vent dependence, history of pulmonary embolism, atrial fibrillation, peripheral artery disease with history of critical limb ischemia, who was transferred to [facility] from a long-term care facility. She was transferred due to acute blood loss anemia, elevated leukocytosis and acute renal failure. She was recently started on tobramycin for pseudomonas growing in her sputum.\"      History of Present Illness   Jacques Solis is a 88 year old female who presents with inadequate home support and possible acute on chronic hypoxic respiratory failure.  She has essentially been hospitalized since September 2017 when she was admitted to [facility] for critical limb ischemia and underwent balloon angioplasty which was complicated by femoral artery perforation with hemorrhagic shock.  She also had chronic respiratory and was unable to wean from the vent and was trached and pegged on 11/13/17.  She was also noted to have some encephalopathy.  Ultimately she was discharged to [facility]. She has essentially moved back and forth from [facility] and [facility] and then to [facility] and [facility] ever since.  Her most recent admission to [facility] 4/26-5/4 was due to acute blood loss anemia due to left thigh hematoma in addition to renal failure due to tobramycin toxicity for multi-drug resistant Pseudomonas.  She also underwent debridement of a right lower extremity wound due to an infected hematoma.  She was ultimately discharged to [facility], where she " has stayed until discharge home on 5/18/18.  Pulmonology felt that she would not be able to wean and recommended discharge home or to TCU and son elected for discharge home.      He reports he was not set up with anyone who would be able to manage a home vent, reports not being given detailed sliding scale insulin instructions (no dosage parameters), was unable to obtain nebs due to lack of appropriate ICD code on discharge orders, did not have supplies at home to replace clogged tube feeding piece in addition to concerns about the competency of the home nurse.  Due to these issues he presented to [facility], unclear as to why he did not return to [facility], which is very familiar with the patient's case.  He reports his mother has otherwise been stable with the exception that she experienced several acute desaturation events over the past 2 days which resolved with suctioning.  He reports about 1 month ago she was able to tolerate being off the ventilator for about 16 hours/day and was able to speak with passy wilner valve.    Son reports his goals are for his mother to be able to wean from the vent for at least a few hours and be able to speak with valve and possible eat some food by mouth.  He understands that she may never be able to fully wean from the vent and is accepting of this, however, he wants any reversible conditions treated.    Past Medical History    Paroxysmal atrial fibrillation  Chronic respiratory failure secondary to recurrent pleural effusions and deconditioning  Severe aortic stenosis  Chronic diastolic congestive heart failure  Diabetes mellitus type 2  Asthma   Dysphagia with feeding tube dependence  History of critical limb ischemia  Delirium    Past Surgical History   D&C  Tracheostomy  PEG tube placement        Allergies   Allergies   Allergen Reactions     Amikacin      Amiodarone      Codeine      Dexmedetomidine      bradycardia     Lisinopril      Penicillins      Sulfa Drugs             Physical Exam   Temp: 98.1  F (36.7  C) Temp src: Axillary BP: 96/62 Pulse: 129 Heart Rate: 80 Resp: 26 SpO2: 100 % O2 Device: Mechanical Ventilator    Vital Signs with Ranges  Temp:  [97.9  F (36.6  C)-98.4  F (36.9  C)] 98.1  F (36.7  C)  Pulse:  [129] 129  Heart Rate:  [] 80  Resp:  [9-79] 26  BP: ()/() 96/62  FiO2 (%):  [30 %] 30 %  SpO2:  [93 %-100 %] 100 %  192 lbs .33 oz    Constitutional:  Elderly female appears stated brighter this AM   Eyes: pupils equal, round and reactive to light,  HEENT: , trach in place, [place]   Respiratory: lungs clear to auscultation bilaterally, synchronous with vent  Cardiovascular: RR rhythm, normal S1/S2, no murmur, rubs or gallops  GI: abdomen soft, non-tender, somewhat distended, normal bowel sounds, no hepatosplenomegaly, PEG tube in place,  Extremities : thigh edema bilaterally - warm well perfused  Skin: dressing over skin wound right thigh.   Neuro: intermittently following commands , moving all 4 extremities    I  Data   Data reviewed today:  I personally reviewed no images or EKG's today.    Recent Labs  Lab 06/01/18  0510 05/31/18  1911 05/31/18  1020 05/31/18  0502 05/30/18  2000  05/30/18  0257   WBC 13.3*  --  16.4* 15.9*  --   < > 10.1   HGB 7.6* 8.5* 7.1* 7.3*  --   < > 7.2*   MCV 88  --  87 88  --   < > 89     --  241 248  --   < > 213   INR 2.44* 2.32*  --  2.58*  --   < > 4.50*     --   --  139 139  --  140   POTASSIUM 5.5*  --   --  5.2 4.9  --  4.4   CHLORIDE 97  --   --  101 100  --  100   CO2 30  --   --  34* 33*  --  32   BUN 81*  --   --  77* 70*  --  70*   CR 1.69*  --   --  1.39* 1.29*  --  1.21*   ANIONGAP 10  --   --  4 6  --  8   KRISTOFER 8.2*  --   --  8.5 8.4*  --  8.0*   *  --   --  133* 163*  --  206*   ALBUMIN  --   --   --   --  2.2*  --  2.0*   PROTTOTAL  --   --   --   --  5.9*  --  5.6*   BILITOTAL  --   --   --   --  0.5  --  0.4   ALKPHOS  --   --   --   --  148  --  167*   ALT  --   --   --   --   42  --  46   AST  --   --   --   --  40  --  43   < > = values in this interval not displayed.    Recent Results (from the past 24 hour(s))   CT Femur Right w/o Contrast    Narrative    CT FEMUR RIGHT WITHOUT CONTRAST, CT FEMUR LEFT WITHOUT CONTRAST  5/31/2018 2:41 PM     HISTORY: Question thigh bleed - on chronic AC, prior bleed into thighs  - with acute blood loss anemia.     TECHNIQUE: Axial scans were performed through the femurs bilaterally  without intravenous contrast. Radiation dose for this scan was reduced  using automated exposure control, adjustment of the mA and/or kV  according to patient size, or iterative reconstruction technique.    COMPARISON: None.    FINDINGS: There are large hematomas in the medial proximal thigh  bilaterally. There is high density material in these regions  consistent with blood. On the right there is a small fluid fluid  level.    On the right the blood appears both intramuscular in the adductor  musculature of the hip as well as extending somewhat subcutaneously.  This hematoma measures approximately 13.7 x 5.4 x 6.9 cm in size.    On the left the proximal medial thigh hematoma measures approximately  9.6 x 5.9 x 12.4 cm. This appears to be partially intramuscular and  extend into the subcutaneous tissues as well in the region of the  adductors.    There is diffuse subcutaneous edema and diffuse muscle atrophy. No  evidence of acute fracture. There are vascular calcifications typical  of a diabetic patient.      Impression    IMPRESSION: Large bilateral proximal medial thigh hematomas as  described above.    JAMIL CAMPOS MD   CT Femur Left w/o Contrast    Narrative    CT FEMUR RIGHT WITHOUT CONTRAST, CT FEMUR LEFT WITHOUT CONTRAST  5/31/2018 2:41 PM     HISTORY: Question thigh bleed - on chronic AC, prior bleed into thighs  - with acute blood loss anemia.     TECHNIQUE: Axial scans were performed through the femurs bilaterally  without intravenous contrast. Radiation dose for  this scan was reduced  using automated exposure control, adjustment of the mA and/or kV  according to patient size, or iterative reconstruction technique.    COMPARISON: None.    FINDINGS: There are large hematomas in the medial proximal thigh  bilaterally. There is high density material in these regions  consistent with blood. On the right there is a small fluid fluid  level.    On the right the blood appears both intramuscular in the adductor  musculature of the hip as well as extending somewhat subcutaneously.  This hematoma measures approximately 13.7 x 5.4 x 6.9 cm in size.    On the left the proximal medial thigh hematoma measures approximately  9.6 x 5.9 x 12.4 cm. This appears to be partially intramuscular and  extend into the subcutaneous tissues as well in the region of the  adductors.    There is diffuse subcutaneous edema and diffuse muscle atrophy. No  evidence of acute fracture. There are vascular calcifications typical  of a diabetic patient.      Impression    IMPRESSION: Large bilateral proximal medial thigh hematomas as  described above.    JAMIL CAMPOS MD       30 min CCT

## 2018-06-01 NOTE — PLAN OF CARE
Problem: Patient Care Overview  Goal: Plan of Care/Patient Progress Review  Outcome: No Change  Pt alert to self. No pain noted. Restless and agitated at times, redirectable. Hypotensive at times. Tachy, PRN received x 1. Tolerated PS 12/5 for 3+ hrs. Tube feed resumed @ 10am @ 15cc/hr. External cath in place. No BM this shift. Up in the chair for several hours.  1 unit of blood and Vit K received this evening. Family at the bedside, updated and aware. Will continue to monitor.

## 2018-06-01 NOTE — PROGRESS NOTES
Care Coordination:    Per request from family yesterday about air mattress for home use, placed call to [employer] to check if they received fax from yesterday. Talked to Catalina who told writer patient would have to have a Stage 3 to Stage 4 would on trunk or pelvis. Paged wound care nurse to chart staging of wounds in WOC note.    Update: wounds are not staged because they are surgical wounds per [facility] Nurse. Patient would not qualify for an Air mattress through medicare. L/M for [employer] to see how much it would cost for private pay.    Judie Lloyd RN BSN  [facility] Care Coordinator  Phone [phone]

## 2018-06-01 NOTE — PLAN OF CARE
Problem: Patient Care Overview  Goal: Plan of Care/Patient Progress Review  Outcome: No Change  Patient continues on vent support throughout shift. Plan to PS trial this afternoon.  HR A fib 70s-110s. Hypotensive this morning, but stable now after 1u PRBCs transfused. CBC recheck pending.  Tolerating TF at goal rate. Purewick intact and collecting urine output.

## 2018-06-01 NOTE — PLAN OF CARE
Problem: Patient Care Overview  Goal: Plan of Care/Patient Progress Review  Outcome: No Change  Pt remains confused, trached on ventilator. Pt in A- fib, rate controlled on metoprolol.  Suctioned for med white/yellow secretions. Breath sounds are decreased with faint crackles bibasilar. Being seen by wound care for right lower extremity wound. Wound is covered and dressing is intact. Tube feeding increased to 35/hr tolerating well.

## 2018-06-01 NOTE — PROGRESS NOTES
[facility] ICU RESPIRATORY NOTE  Date of Admission: 5/21/2018  Date of Intubation (most recent): 5/21/18  Reason for Mechanical Ventilation: Acute on chronic resp. Failure with vent dependency  Number of Days on Mechanical Ventilation:   Met Criteria for Pressure Support Trial: Yes    Ventilation Mode: CMV/AC  (Continuous Mandatory Ventilation/ Assist Control)  FiO2 (%): 30 %  Rate Set (breaths/minute): 8 breaths/min  Tidal Volume Set (mL): 360 mL  PEEP (cm H2O): 5 cmH2O  Pressure Support (cm H2O): 12 cmH2O  Oxygen Concentration (%): 30 %  Resp: 20        ETT appearance on chest x-ray:    Plan:    Continue ventilatory support.  PS trial this afternoon.

## 2018-06-01 NOTE — PLAN OF CARE
Problem: Patient Care Overview  Goal: Plan of Care/Patient Progress Review    SLP - Tx scheduled. Son present along with pt's caregiver, Christine. Pt lethargic, son states due to sedation meds and sensitivity. RN, MD, NP with pt, son currently as concern for cont bleeding from leg hematoma. Son requests reschedule appt with SLP and RT tomorrow (Sat 6/2) at 10 am.

## 2018-06-01 NOTE — PROGRESS NOTES
[facility] ICU RESPIRATORY NOTE  Date of Admission: 5/21/2018  Date of Intubation (most recent): 5/21/18  Reason for Mechanical Ventilation: Acute on chronic respiratory failure with vent dependency  Number of Days on Mechanical Ventilation:  Met Criteria for Pressure Support Trial: yes  Length of Pressure Support Trial:     Ventilation Mode: CMV/AC  (Continuous Mandatory Ventilation/ Assist Control)  FiO2 (%): 30 %  Rate Set (breaths/minute): 16 breaths/min  Tidal Volume Set (mL): 360 mL  PEEP (cm H2O): 5 cmH2O  Pressure Support (cm H2O): 12 cmH2O  Oxygen Concentration (%): 30 %  Resp: 20    Assess daily for weaning readiness.    6/1/2018  Radha Johnston RRT

## 2018-06-02 ENCOUNTER — APPOINTMENT (OUTPATIENT)
Dept: SPEECH THERAPY | Facility: CLINIC | Age: 83
DRG: 207 | End: 2018-06-02
Payer: MEDICARE

## 2018-06-02 ENCOUNTER — APPOINTMENT (OUTPATIENT)
Dept: PHYSICAL THERAPY | Facility: CLINIC | Age: 83
DRG: 207 | End: 2018-06-02
Payer: MEDICARE

## 2018-06-02 LAB
ALBUMIN SERPL-MCNC: 1.9 G/DL (ref 3.4–5)
ALP SERPL-CCNC: 172 U/L (ref 40–150)
ALT SERPL W P-5'-P-CCNC: 28 U/L (ref 0–50)
ANION GAP SERPL CALCULATED.3IONS-SCNC: 4 MMOL/L (ref 3–14)
AST SERPL W P-5'-P-CCNC: 25 U/L (ref 0–45)
BASOPHILS # BLD AUTO: 0 10E9/L (ref 0–0.2)
BASOPHILS NFR BLD AUTO: 0.2 %
BILIRUB SERPL-MCNC: 0.7 MG/DL (ref 0.2–1.3)
BUN SERPL-MCNC: 88 MG/DL (ref 7–30)
CALCIUM SERPL-MCNC: 8.3 MG/DL (ref 8.5–10.1)
CHLORIDE SERPL-SCNC: 98 MMOL/L (ref 94–109)
CO2 SERPL-SCNC: 34 MMOL/L (ref 20–32)
CREAT SERPL-MCNC: 1.75 MG/DL (ref 0.52–1.04)
DIFFERENTIAL METHOD BLD: ABNORMAL
EOSINOPHIL # BLD AUTO: 0.1 10E9/L (ref 0–0.7)
EOSINOPHIL NFR BLD AUTO: 1.2 %
ERYTHROCYTE [DISTWIDTH] IN BLOOD BY AUTOMATED COUNT: 16.9 % (ref 10–15)
GFR SERPL CREATININE-BSD FRML MDRD: 27 ML/MIN/1.7M2
GLUCOSE BLDC GLUCOMTR-MCNC: 164 MG/DL (ref 70–99)
GLUCOSE BLDC GLUCOMTR-MCNC: 170 MG/DL (ref 70–99)
GLUCOSE BLDC GLUCOMTR-MCNC: 173 MG/DL (ref 70–99)
GLUCOSE BLDC GLUCOMTR-MCNC: 176 MG/DL (ref 70–99)
GLUCOSE BLDC GLUCOMTR-MCNC: 179 MG/DL (ref 70–99)
GLUCOSE BLDC GLUCOMTR-MCNC: 182 MG/DL (ref 70–99)
GLUCOSE SERPL-MCNC: 174 MG/DL (ref 70–99)
HCT VFR BLD AUTO: 26.6 % (ref 35–47)
HGB BLD-MCNC: 8.7 G/DL (ref 11.7–15.7)
IMM GRANULOCYTES # BLD: 0.1 10E9/L (ref 0–0.4)
IMM GRANULOCYTES NFR BLD: 0.8 %
INR PPP: 1.61 (ref 0.86–1.14)
LYMPHOCYTES # BLD AUTO: 0.6 10E9/L (ref 0.8–5.3)
LYMPHOCYTES NFR BLD AUTO: 5.7 %
MCH RBC QN AUTO: 28.9 PG (ref 26.5–33)
MCHC RBC AUTO-ENTMCNC: 32.7 G/DL (ref 31.5–36.5)
MCV RBC AUTO: 88 FL (ref 78–100)
MONOCYTES # BLD AUTO: 0.9 10E9/L (ref 0–1.3)
MONOCYTES NFR BLD AUTO: 7.6 %
NEUTROPHILS # BLD AUTO: 9.5 10E9/L (ref 1.6–8.3)
NEUTROPHILS NFR BLD AUTO: 84.5 %
NRBC # BLD AUTO: 0 10*3/UL
NRBC BLD AUTO-RTO: 0 /100
PLATELET # BLD AUTO: 175 10E9/L (ref 150–450)
POTASSIUM SERPL-SCNC: 4.5 MMOL/L (ref 3.4–5.3)
PROT SERPL-MCNC: 5.4 G/DL (ref 6.8–8.8)
RBC # BLD AUTO: 3.01 10E12/L (ref 3.8–5.2)
SODIUM SERPL-SCNC: 136 MMOL/L (ref 133–144)
WBC # BLD AUTO: 11.2 10E9/L (ref 4–11)

## 2018-06-02 PROCEDURE — 25000132 ZZH RX MED GY IP 250 OP 250 PS 637: Mod: GY | Performed by: INTERNAL MEDICINE

## 2018-06-02 PROCEDURE — 25000128 H RX IP 250 OP 636: Performed by: INTERNAL MEDICINE

## 2018-06-02 PROCEDURE — 00000146 ZZHCL STATISTIC GLUCOSE BY METER IP

## 2018-06-02 PROCEDURE — 40000225 ZZH STATISTIC SLP WARD VISIT: Performed by: SPEECH-LANGUAGE PATHOLOGIST

## 2018-06-02 PROCEDURE — 40000275 ZZH STATISTIC RCP TIME EA 10 MIN

## 2018-06-02 PROCEDURE — 25000132 ZZH RX MED GY IP 250 OP 250 PS 637: Mod: GY | Performed by: NURSE PRACTITIONER

## 2018-06-02 PROCEDURE — A9270 NON-COVERED ITEM OR SERVICE: HCPCS | Mod: GY | Performed by: NURSE PRACTITIONER

## 2018-06-02 PROCEDURE — 25000132 ZZH RX MED GY IP 250 OP 250 PS 637: Mod: GY | Performed by: HOSPITALIST

## 2018-06-02 PROCEDURE — 94640 AIRWAY INHALATION TREATMENT: CPT

## 2018-06-02 PROCEDURE — 80053 COMPREHEN METABOLIC PANEL: CPT | Performed by: HOSPITALIST

## 2018-06-02 PROCEDURE — 85025 COMPLETE CBC W/AUTO DIFF WBC: CPT | Performed by: HOSPITALIST

## 2018-06-02 PROCEDURE — 85610 PROTHROMBIN TIME: CPT | Performed by: HOSPITALIST

## 2018-06-02 PROCEDURE — A9270 NON-COVERED ITEM OR SERVICE: HCPCS | Mod: GY | Performed by: INTERNAL MEDICINE

## 2018-06-02 PROCEDURE — 99291 CRITICAL CARE FIRST HOUR: CPT | Performed by: INTERNAL MEDICINE

## 2018-06-02 PROCEDURE — 92507 TX SP LANG VOICE COMM INDIV: CPT | Mod: GN | Performed by: SPEECH-LANGUAGE PATHOLOGIST

## 2018-06-02 PROCEDURE — 25000131 ZZH RX MED GY IP 250 OP 636 PS 637: Mod: GY | Performed by: NURSE PRACTITIONER

## 2018-06-02 PROCEDURE — 94003 VENT MGMT INPAT SUBQ DAY: CPT

## 2018-06-02 PROCEDURE — 97110 THERAPEUTIC EXERCISES: CPT | Mod: GP,XU | Performed by: PHYSICAL THERAPIST

## 2018-06-02 PROCEDURE — 20000003 ZZH R&B ICU

## 2018-06-02 PROCEDURE — 27210432 ZZH NUTRITION PRODUCT RENAL BASIC LITER

## 2018-06-02 PROCEDURE — 97530 THERAPEUTIC ACTIVITIES: CPT | Mod: GP,XU | Performed by: PHYSICAL THERAPIST

## 2018-06-02 PROCEDURE — 94640 AIRWAY INHALATION TREATMENT: CPT | Mod: 76

## 2018-06-02 PROCEDURE — 25000125 ZZHC RX 250: Performed by: INTERNAL MEDICINE

## 2018-06-02 PROCEDURE — 40000193 ZZH STATISTIC PT WARD VISIT: Performed by: PHYSICAL THERAPIST

## 2018-06-02 PROCEDURE — 40000239 ZZH STATISTIC VAT ROUNDS

## 2018-06-02 RX ORDER — ZINC OXIDE
OINTMENT (GRAM) TOPICAL
Status: DISCONTINUED | OUTPATIENT
Start: 2018-06-02 | End: 2018-06-14 | Stop reason: HOSPADM

## 2018-06-02 RX ADMIN — RANITIDINE HYDROCHLORIDE 150 MG: 150 SOLUTION ORAL at 08:06

## 2018-06-02 RX ADMIN — Medication: at 15:22

## 2018-06-02 RX ADMIN — INSULIN ASPART 1 UNITS: 100 INJECTION, SOLUTION INTRAVENOUS; SUBCUTANEOUS at 08:11

## 2018-06-02 RX ADMIN — INSULIN ASPART 1 UNITS: 100 INJECTION, SOLUTION INTRAVENOUS; SUBCUTANEOUS at 15:26

## 2018-06-02 RX ADMIN — IPRATROPIUM BROMIDE AND ALBUTEROL SULFATE 3 ML: .5; 3 SOLUTION RESPIRATORY (INHALATION) at 19:41

## 2018-06-02 RX ADMIN — FLUTICASONE PROPIONATE 1 SPRAY: 50 SPRAY, METERED NASAL at 08:07

## 2018-06-02 RX ADMIN — MELATONIN 6 MG: 3 TAB ORAL at 19:38

## 2018-06-02 RX ADMIN — INSULIN ASPART 1 UNITS: 100 INJECTION, SOLUTION INTRAVENOUS; SUBCUTANEOUS at 12:09

## 2018-06-02 RX ADMIN — HYDROMORPHONE HYDROCHLORIDE 0.2 MG: 1 INJECTION, SOLUTION INTRAMUSCULAR; INTRAVENOUS; SUBCUTANEOUS at 13:35

## 2018-06-02 RX ADMIN — VALPROIC ACID 125 MG: 250 SOLUTION ORAL at 19:39

## 2018-06-02 RX ADMIN — Medication 1 CAPSULE: at 08:06

## 2018-06-02 RX ADMIN — PREDNISOLONE ACETATE 1 DROP: 1.2 SUSPENSION/ DROPS OPHTHALMIC at 21:09

## 2018-06-02 RX ADMIN — IPRATROPIUM BROMIDE AND ALBUTEROL SULFATE 3 ML: .5; 3 SOLUTION RESPIRATORY (INHALATION) at 12:33

## 2018-06-02 RX ADMIN — HYDROMORPHONE HYDROCHLORIDE 0.2 MG: 1 INJECTION, SOLUTION INTRAMUSCULAR; INTRAVENOUS; SUBCUTANEOUS at 00:19

## 2018-06-02 RX ADMIN — INSULIN ASPART 1 UNITS: 100 INJECTION, SOLUTION INTRAVENOUS; SUBCUTANEOUS at 04:25

## 2018-06-02 RX ADMIN — IPRATROPIUM BROMIDE AND ALBUTEROL SULFATE 3 ML: .5; 3 SOLUTION RESPIRATORY (INHALATION) at 07:38

## 2018-06-02 RX ADMIN — INSULIN GLARGINE 20 UNITS: 100 INJECTION, SOLUTION SUBCUTANEOUS at 21:09

## 2018-06-02 RX ADMIN — INSULIN ASPART 1 UNITS: 100 INJECTION, SOLUTION INTRAVENOUS; SUBCUTANEOUS at 01:31

## 2018-06-02 RX ADMIN — PREDNISOLONE ACETATE 1 DROP: 1.2 SUSPENSION/ DROPS OPHTHALMIC at 13:32

## 2018-06-02 RX ADMIN — INSULIN ASPART 1 UNITS: 100 INJECTION, SOLUTION INTRAVENOUS; SUBCUTANEOUS at 23:59

## 2018-06-02 RX ADMIN — MIRTAZAPINE 30 MG: 7.5 TABLET ORAL at 21:09

## 2018-06-02 RX ADMIN — TORSEMIDE 10 MG: 10 TABLET ORAL at 08:06

## 2018-06-02 RX ADMIN — Medication 1 CAPSULE: at 21:15

## 2018-06-02 RX ADMIN — Medication 12.5 MG: at 08:06

## 2018-06-02 RX ADMIN — INSULIN ASPART 1 UNITS: 100 INJECTION, SOLUTION INTRAVENOUS; SUBCUTANEOUS at 19:39

## 2018-06-02 NOTE — PLAN OF CARE
Problem: Patient Care Overview  Goal: Plan of Care/Patient Progress Review    Discharge Planner SLP   Patient plan for discharge: Did not state  Current status: Passy [place] Valve (PMV) communication Tx was provided this am with RT assistance.  Patient tolerated the PMV for 20 minutes in line with the ventilator on CMV mode with stable saturation levels above 92%.  RT noted brief decreased saturation levels after cuff deflation, but sats returned above 92% with vent setting adjustment to 40%.  RR was noted to be 20-30 during the speaking trial.  Good voicing was achieved with some decreased coordination of breathing with vent delivered breaths.  Patient was confused and disoriented during the session, but calm and cooperative.  Plan to continue PMV trial in SLP communication Tx on 6/4/18 with RT assist.  Recommend PMV with RT and SLP supervision only at this time.  Barriers to return to prior living situation: Assist of 2 for mobility  Recommendations for discharge: TCU/LTACH with SLP services  Rationale for recommendations: SLP eval and Tx to maximize communication with PMV and complete swallow eval and Tx when improved PMV established       Entered by: Radha Sorenson 06/02/2018 10:38 AM

## 2018-06-02 NOTE — PROGRESS NOTES
[facility]    Daily Progress Note  [facility]       Date of Admission:  5/21/2018    Main Plan for day  1. desitin cream for skin protection  2. Speaking valve  3. Pressure support as tolerated.    Assessment & Plan   Jacques Solis is a 88 year old female with past history A-fib, CHF, severe AS, DM II, asthma, chronic respiratory failure with vent dependence who was discharged home from [facility] on 5/18/18 but did not have appropriate home services arranged, but son also reporting a recent decline in respiratory status.     Acute on chronic hypoxic respiratory failure with vent dependence:  Chronic failure felt due to pleural effusions and overall debility. Pulmonologist at [facility] felt she would chronically be vent dependent and weaning efforts were stopped.  CT chest in ED showed small to moderated loculated bilateral pleural effusions with atelectasis.  Also a 4 cm gil opacity in left upper lobe, but no SIRS criteria present.  Tolerating moderate levels of PS wean for longer periods of time, will continue.    Ventilator status stable.  Tolerating PS 12 and has -270.  Will trial lower PS 10. Plan for TIW speaking valve while hospitalized (did 10 minutes today)  - suspect part of restriction from kyphosis   - will work to arrange discharge to home. Social work and care coordination involved    Paroxysmal A-fib:   Intermittent rapid - which is evolving from NSR   - continue PTA metoprolol (note son has been dosing this at 25 mg q8h) - required intermittent IV dose 5/30  - anticoagulation on hold, see below    Hx of Arterial Clot (LE) and Afib  - coumadin on hold given thigh hematoma   - benefit becoming less favorable given recurrent bleed - prior plan for home INR monitoring will need to be revisited prior to discahrge.    Severe AS  Chronic diastolic CHF  TTE 3/2018 with EF 55-60%, severe AS (area of 0.6-0.7) and mild regurgitation, moderate MR.  Is reportedly not a TAVR  candidate.  Potassium has remained normal on daily replacement.  - continue torsemide decrease dose to 10 mg daily   - follow I/Os keep even  - disccontinue scheduled daily potassium     DM II: Lantus 20 units daily with SSI,   -noted daughter prior  requests oral DM medications only except for sliding scale.     Asthma:  Continue PTA nebs.    Recent acute blood loss anemia with thigh hematomas (Confirmed on CT 5/31):  INR mildly elevated at 4 but prior reportedly due to supratherapeutic lovenox in setting of PAULY.  Hgb ~ 8.4 with drop 5/29 with agitation, transfused to ~8 and has been stable for past 3 checks.  - INR trending down, 1.6 today  - serial CBC  - hold anticoagulation, will need to discuss long-term safety of anticoagulation      Delirium - with noted encephalopathy -   Discharge summary states probable encephalopathy due to critical illness superimposed on some dementia.  Son denies any history of dementia, patient following commands but not oriented to place or time.  - continue PTA Seroquel PRN - increase dose to 12.5   - remeron 30 mg  - decrease depakote to qhs    - Melatonin 6mg QHS    Nonhealing right leg wound  Due to hematoma (from prior intervention 9/2017) with necrotic eschar now s/p debridement  - WOC consult following     Renal-  Non oliguric PAULY  - Cr rise times with acute bleed with hypotension   Morris removed (discontinued per family's request) and replaced 6/1 given ongoing skin breakdown and difficulty monitoring UOP.  Culture with mixed GPC and GNR but <100K CFU suggestive of contamination; WBC stable and no convincing evidence of active infection.  - follow Cr, UOP, maintain hemodynamics     Dysphagia  - continue PTA tube feeds, currently at goal   - Speaking valve TIW. Needs cues to cough, swallow, significant pooling of secretions in mouth.    Mild abdominal distension  -possible adynamic ileus, (chronic per family)  tube feeds for emesis 5/30 - tolerating thus far    -simethicone  "prn    DVT Prophylaxis: Warfarin - on hold         Disposition: Expected discharge as soon as acute issues stabilized     Debra Lazar    Primary Care Physician   Michael Mccarthy     24 hour events:  No acute events overnight. Pt received 1U PRBC and vitamin K yesterday, changed centrally acting meds, replaced huggins given skin breakdown and need to follow UOP. This morning, pt has speaking valve in place. Wanted to throw something away- I offered my hand (to take whatever she wanted to throw away) and she moved her hand to place something in mine- nothing was in her hand. Asked several times about the white board (with her name and her son's name on it). Denied pain (abdominal or leg pain) but did state that she wanted her back to be \"thinner\" then that she felt some back discomfort.      Chief Complaint   Lack of home resources for management    History is obtained from the patient's son (who is an anesthesiologist) and chart review. Recent discharge summary from [facility] said the following \"87-year-old female well-known to the critical care service with a history of some mild memory impairment, but has been essentially hospital-dependent for the last 9 months with severe aortic stenosis, not a candidate for TAVR, chronic diastolic heart failure, chronic bilateral recurrent pleural effusions, chronic vent dependence, history of pulmonary embolism, atrial fibrillation, peripheral artery disease with history of critical limb ischemia, who was transferred to [facility] from a long-term care facility. She was transferred due to acute blood loss anemia, elevated leukocytosis and acute renal failure. She was recently started on tobramycin for pseudomonas growing in her sputum.\"      History of Present Illness   Jacques Solis is a 88 year old female who presents with inadequate home support and possible acute on chronic hypoxic respiratory failure.  She has essentially been hospitalized since " September 2017 when she was admitted to [facility] for critical limb ischemia and underwent balloon angioplasty which was complicated by femoral artery perforation with hemorrhagic shock.  She also had chronic respiratory and was unable to wean from the vent and was trached and pegged on 11/13/17.  She was also noted to have some encephalopathy.  Ultimately she was discharged to [facility]. She has essentially moved back and forth from [facility] and [facility] and then to [facility] and [facility] ever since.  Her most recent admission to [facility] 4/26-5/4 was due to acute blood loss anemia due to left thigh hematoma in addition to renal failure due to tobramycin toxicity for multi-drug resistant Pseudomonas.  She also underwent debridement of a right lower extremity wound due to an infected hematoma.  She was ultimately discharged to [facility], where she has stayed until discharge home on 5/18/18.  Pulmonology felt that she would not be able to wean and recommended discharge home or to TCU and son elected for discharge home.      He reports he was not set up with anyone who would be able to manage a home vent, reports not being given detailed sliding scale insulin instructions (no dosage parameters), was unable to obtain nebs due to lack of appropriate ICD code on discharge orders, did not have supplies at home to replace clogged tube feeding piece in addition to concerns about the competency of the home nurse.  Due to these issues he presented to [facility], unclear as to why he did not return to [facility], which is very familiar with the patient's case.  He reports his mother has otherwise been stable with the exception that she experienced several acute desaturation events over the past 2 days which resolved with suctioning.  He reports about 1 month ago she was able to tolerate being off the ventilator for about 16 hours/day and was able to speak with passy wilner valve.    Son reports his goals are for his mother to be able to wean from  the vent for at least a few hours and be able to speak with valve and possible eat some food by mouth.  He understands that she may never be able to fully wean from the vent and is accepting of this, however, he wants any reversible conditions treated.    Past Medical History    Paroxysmal atrial fibrillation  Chronic respiratory failure secondary to recurrent pleural effusions and deconditioning  Severe aortic stenosis  Chronic diastolic congestive heart failure  Diabetes mellitus type 2  Asthma   Dysphagia with feeding tube dependence  History of critical limb ischemia  Delirium    Past Surgical History   D&C  Tracheostomy  PEG tube placement        Allergies   Allergies   Allergen Reactions     Amikacin      Amiodarone      Codeine      Dexmedetomidine      bradycardia     Lisinopril      Penicillins      Sulfa Drugs            Physical Exam   Temp: (P) 97.4  F (36.3  C) Temp src: (P) Axillary BP: 102/49   Heart Rate: 80 Resp: 21 SpO2: 99 % O2 Device: Mechanical Ventilator    Vital Signs with Ranges  Temp:  [97.4  F (36.3  C)-98.1  F (36.7  C)] (P) 97.4  F (36.3  C)  Heart Rate:  [74-93] 80  Resp:  [14-89] 21  BP: ()/() 102/49  FiO2 (%):  [30 %] 30 %  SpO2:  [97 %-100 %] 99 %  192 lbs 10.91 oz    Constitutional:  Elderly female appears stated age   Eyes: pupils equal, round and reactive to light,  HEENT: , trach in place, pooling secretions in mouth with talking   Respiratory: lungs clear to auscultation bilaterally, synchronous with vent  Cardiovascular: RR rhythm, normal S1/S2, no murmur, rubs or gallops  GI: abdomen soft, non-tender, moldly distended, normal bowel sounds, no hepatosplenomegaly, PEG tube in place,  Extremities : thigh edema bilaterally (mild pitting). Left knee warm to palpation but no erythema. DP pulses palpable bilaterally. Right lower leg wound not visible with pt lying in bed (entirely on the dependent portion of the leg)  Skin: dressing over skin wound right leg.   Neuro:  intermittently following commands , moving all 4 extremities    I  Data   Data reviewed today:  I personally reviewed no images or EKG's today.    Recent Labs  Lab 06/02/18  0425 06/01/18  1705 06/01/18  1430 06/01/18  0510 05/31/18  1911  05/30/18 2000   WBC 11.2*  --  11.0 13.3*  --   < >  --    HGB 8.7*  --  9.0* 7.6* 8.5*  < >  --    MCV 88  --  88 88  --   < >  --      --  188 208  --   < >  --    INR 1.61*  --   --  2.44* 2.32*  < >  --     136  --  137  --   < > 139   POTASSIUM 4.5 5.0  --  5.5*  --   < > 4.9   CHLORIDE 98 96  --  97  --   < > 100   CO2 34* 32  --  30  --   < > 33*   BUN 88* 81*  --  81*  --   < > 70*   CR 1.75* 1.77*  --  1.69*  --   < > 1.29*   ANIONGAP 4 8  --  10  --   < > 6   KRISTOFER 8.3* 8.4*  --  8.2*  --   < > 8.4*   * 143*  --  186*  --   < > 163*   ALBUMIN 1.9*  --   --   --   --   --  2.2*   PROTTOTAL 5.4*  --   --   --   --   --  5.9*   BILITOTAL 0.7  --   --   --   --   --  0.5   ALKPHOS 172*  --   --   --   --   --  148   ALT 28  --   --   --   --   --  42   AST 25  --   --   --   --   --  40   < > = values in this interval not displayed.    No results found for this or any previous visit (from the past 24 hour(s)).    30 min CCT

## 2018-06-02 NOTE — PLAN OF CARE
Problem: Patient Care Overview  Goal: Plan of Care/Patient Progress Review  Discharge Planner PT   Patient plan for discharge: Per chart, home with family and caregiver.   Current status: Patient grimacing with right hip flexion during ex and with attempted sitting on edge, patient resisting and leaning back heavily. Very difficult to get to sitting position for this reason.   Barriers to return to prior living situation: Amount of assist needed for mobility however they do have a lift at home.    Recommendations for discharge: Home if family able to provide assist needed including ablility  to use kerrie lift.   Rationale for recommendations:  Chart indicates family is making plans and getting supplies needed for home.        Entered by: Sandra Arevalo 06/02/2018 12:52 PM

## 2018-06-02 NOTE — PROGRESS NOTES
Cuff was deflated at 1000. SpO2 dropped to 88%. FiO2 was then increased to 40%. PMV was then placed inline while pt was on CMV settings. Pt tolerated PMV for 20 minutes. Cuff was then re-inflated with 6 ml of sterile water. FiO2 was then decreased back to 30%.  6/2/2018  Cely Valentin RRT

## 2018-06-02 NOTE — PROGRESS NOTES
On license of [facility] ICU RESPIRATORY NOTE  Date of Admission: 5/21/2018  Date of Intubation (most recent):5/21/2018  Reason for Mechanical Ventilation:Acute on Chronic resp. failure  Number of Days on Mechanical Ventilation:  Met Criteria for Pressure Support Trial:Yes  Length of Pressure Support Trial:PS 15/5 4hrs and 15 minutes then decreased to PS 12/5 2.5 hrs, and then decreased to 10/5 for 10 minutes.  Reason for Stopping Pressure Support Trial:RR 28-30 and pt became agitated.     Ventilation Mode: CMV/AC  (Continuous Mandatory Ventilation/ Assist Control)  FiO2 (%): 30 %  Rate Set (breaths/minute): 8 breaths/min  Tidal Volume Set (mL): 360 mL  PEEP (cm H2O): 5 cmH2O  Pressure Support (cm H2O): 10 cmH2O  Oxygen Concentration (%): 30 %  Resp: 19    Significant Events Today:PMV done for 20 minutes    ABG Results: No new results    ETT appearance on chest x-ray:Trach in place    Plan:  Will cont full vent support overnight and assess for another PS trial in the morning.  6/2/2018  Cely Valentin RRT

## 2018-06-02 NOTE — PLAN OF CARE
Problem: Patient Care Overview  Goal: Plan of Care/Patient Progress Review  Outcome: Therapy, progress toward functional goals as expected  Patient on ventilator support throughout shift. Patient in Normal Sinus Rhythm HR in 80's. Patient hgb 8.7 this morning. INR down to 1.61.Tolerating TF at goal rate. Morris catheter placed before shift, making 60-75mL urine q2 hours. VSS will continue to monitor. Patient and care provider updated on plan of care.

## 2018-06-02 NOTE — PROGRESS NOTES
[facility] ICU RESPIRATORY NOTE  Date of Admission: 5/21/2018  Date of Intubation (most recent): 5/21/18  Reason for Mechanical Ventilation: Acute on chronic resp. Failure with vent dependency  Number of Days on Mechanical Ventilation:   Met Criteria for Pressure Support Trial: Yes     Ventilation Mode: CMV/AC  (Continuous Mandatory Ventilation/ Assist Control)  FiO2 (%): 30 %  Rate Set (breaths/minute): 8 breaths/min  Tidal Volume Set (mL): 360 mL  PEEP (cm H2O): 5 cmH2O  Pressure Support (cm H2O): 12 cmH2O  Oxygen Concentration (%): 30 %  Resp: 20           ETT appearance on chest x-ray:     Plan:    Continue ventilatory support. PS trial during AM shift.     6/2/2018  Radha Johnston RRT

## 2018-06-03 ENCOUNTER — APPOINTMENT (OUTPATIENT)
Dept: GENERAL RADIOLOGY | Facility: CLINIC | Age: 83
DRG: 207 | End: 2018-06-03
Attending: INTERNAL MEDICINE
Payer: MEDICARE

## 2018-06-03 LAB
ANION GAP SERPL CALCULATED.3IONS-SCNC: 8 MMOL/L (ref 3–14)
BUN SERPL-MCNC: 77 MG/DL (ref 7–30)
CALCIUM SERPL-MCNC: 8.2 MG/DL (ref 8.5–10.1)
CHLORIDE SERPL-SCNC: 98 MMOL/L (ref 94–109)
CO2 SERPL-SCNC: 32 MMOL/L (ref 20–32)
CREAT SERPL-MCNC: 1.5 MG/DL (ref 0.52–1.04)
ERYTHROCYTE [DISTWIDTH] IN BLOOD BY AUTOMATED COUNT: 17 % (ref 10–15)
GFR SERPL CREATININE-BSD FRML MDRD: 33 ML/MIN/1.7M2
GLUCOSE BLDC GLUCOMTR-MCNC: 145 MG/DL (ref 70–99)
GLUCOSE BLDC GLUCOMTR-MCNC: 146 MG/DL (ref 70–99)
GLUCOSE BLDC GLUCOMTR-MCNC: 166 MG/DL (ref 70–99)
GLUCOSE BLDC GLUCOMTR-MCNC: 171 MG/DL (ref 70–99)
GLUCOSE BLDC GLUCOMTR-MCNC: 171 MG/DL (ref 70–99)
GLUCOSE BLDC GLUCOMTR-MCNC: 193 MG/DL (ref 70–99)
GLUCOSE SERPL-MCNC: 148 MG/DL (ref 70–99)
HCT VFR BLD AUTO: 28.4 % (ref 35–47)
HGB BLD-MCNC: 9.2 G/DL (ref 11.7–15.7)
INR PPP: 1.52 (ref 0.86–1.14)
MCH RBC QN AUTO: 29.1 PG (ref 26.5–33)
MCHC RBC AUTO-ENTMCNC: 32.4 G/DL (ref 31.5–36.5)
MCV RBC AUTO: 90 FL (ref 78–100)
PHOSPHATE SERPL-MCNC: 3.3 MG/DL (ref 2.5–4.5)
PLATELET # BLD AUTO: 201 10E9/L (ref 150–450)
POTASSIUM SERPL-SCNC: 4.2 MMOL/L (ref 3.4–5.3)
RBC # BLD AUTO: 3.16 10E12/L (ref 3.8–5.2)
SODIUM SERPL-SCNC: 138 MMOL/L (ref 133–144)
WBC # BLD AUTO: 11.9 10E9/L (ref 4–11)

## 2018-06-03 PROCEDURE — 94640 AIRWAY INHALATION TREATMENT: CPT | Mod: 76

## 2018-06-03 PROCEDURE — 94640 AIRWAY INHALATION TREATMENT: CPT

## 2018-06-03 PROCEDURE — 25000132 ZZH RX MED GY IP 250 OP 250 PS 637: Mod: GY | Performed by: INTERNAL MEDICINE

## 2018-06-03 PROCEDURE — 25000132 ZZH RX MED GY IP 250 OP 250 PS 637: Mod: GY | Performed by: HOSPITALIST

## 2018-06-03 PROCEDURE — A9270 NON-COVERED ITEM OR SERVICE: HCPCS | Mod: GY | Performed by: INTERNAL MEDICINE

## 2018-06-03 PROCEDURE — 25000132 ZZH RX MED GY IP 250 OP 250 PS 637: Mod: GY | Performed by: NURSE PRACTITIONER

## 2018-06-03 PROCEDURE — 00000146 ZZHCL STATISTIC GLUCOSE BY METER IP

## 2018-06-03 PROCEDURE — 94003 VENT MGMT INPAT SUBQ DAY: CPT

## 2018-06-03 PROCEDURE — 25000125 ZZHC RX 250: Performed by: INTERNAL MEDICINE

## 2018-06-03 PROCEDURE — A9270 NON-COVERED ITEM OR SERVICE: HCPCS | Mod: GY | Performed by: HOSPITALIST

## 2018-06-03 PROCEDURE — 25000131 ZZH RX MED GY IP 250 OP 636 PS 637: Mod: GY | Performed by: NURSE PRACTITIONER

## 2018-06-03 PROCEDURE — 85610 PROTHROMBIN TIME: CPT | Performed by: HOSPITALIST

## 2018-06-03 PROCEDURE — 85027 COMPLETE CBC AUTOMATED: CPT | Performed by: HOSPITALIST

## 2018-06-03 PROCEDURE — 80048 BASIC METABOLIC PNL TOTAL CA: CPT | Performed by: HOSPITALIST

## 2018-06-03 PROCEDURE — 25000128 H RX IP 250 OP 636: Performed by: INTERNAL MEDICINE

## 2018-06-03 PROCEDURE — 20000003 ZZH R&B ICU

## 2018-06-03 PROCEDURE — 27210432 ZZH NUTRITION PRODUCT RENAL BASIC LITER

## 2018-06-03 PROCEDURE — A9270 NON-COVERED ITEM OR SERVICE: HCPCS | Mod: GY | Performed by: NURSE PRACTITIONER

## 2018-06-03 PROCEDURE — 40000275 ZZH STATISTIC RCP TIME EA 10 MIN

## 2018-06-03 PROCEDURE — 99291 CRITICAL CARE FIRST HOUR: CPT | Performed by: INTERNAL MEDICINE

## 2018-06-03 PROCEDURE — 71045 X-RAY EXAM CHEST 1 VIEW: CPT

## 2018-06-03 PROCEDURE — 84100 ASSAY OF PHOSPHORUS: CPT | Performed by: HOSPITALIST

## 2018-06-03 RX ORDER — POLYETHYLENE GLYCOL 3350 17 G/17G
17 POWDER, FOR SOLUTION ORAL 3 TIMES DAILY PRN
Status: DISCONTINUED | OUTPATIENT
Start: 2018-06-03 | End: 2018-06-14 | Stop reason: HOSPADM

## 2018-06-03 RX ORDER — CIPROFLOXACIN 500 MG/5ML
250 KIT ORAL EVERY 12 HOURS SCHEDULED
Status: DISCONTINUED | OUTPATIENT
Start: 2018-06-03 | End: 2018-06-03

## 2018-06-03 RX ORDER — METOPROLOL TARTRATE 25 MG/1
25 TABLET, FILM COATED ORAL EVERY 8 HOURS
Status: DISCONTINUED | OUTPATIENT
Start: 2018-06-03 | End: 2018-06-14 | Stop reason: HOSPADM

## 2018-06-03 RX ORDER — CIPROFLOXACIN 250 MG/1
250 TABLET, FILM COATED ORAL 2 TIMES DAILY
Status: DISCONTINUED | OUTPATIENT
Start: 2018-06-03 | End: 2018-06-07

## 2018-06-03 RX ORDER — METOPROLOL TARTRATE 25 MG/1
25 TABLET, FILM COATED ORAL EVERY 8 HOURS
Status: DISCONTINUED | OUTPATIENT
Start: 2018-06-03 | End: 2018-06-03

## 2018-06-03 RX ADMIN — POLYETHYLENE GLYCOL 3350 17 G: 17 POWDER, FOR SOLUTION ORAL at 09:07

## 2018-06-03 RX ADMIN — CIPROFLOXACIN HYDROCHLORIDE 250 MG: 250 TABLET, FILM COATED ORAL at 11:14

## 2018-06-03 RX ADMIN — IPRATROPIUM BROMIDE AND ALBUTEROL SULFATE 3 ML: .5; 3 SOLUTION RESPIRATORY (INHALATION) at 12:48

## 2018-06-03 RX ADMIN — METOPROLOL TARTRATE 25 MG: 25 TABLET ORAL at 09:07

## 2018-06-03 RX ADMIN — VALPROIC ACID 125 MG: 250 SOLUTION ORAL at 22:34

## 2018-06-03 RX ADMIN — Medication 12.5 MG: at 09:25

## 2018-06-03 RX ADMIN — INSULIN GLARGINE 20 UNITS: 100 INJECTION, SOLUTION SUBCUTANEOUS at 22:41

## 2018-06-03 RX ADMIN — INSULIN ASPART 1 UNITS: 100 INJECTION, SOLUTION INTRAVENOUS; SUBCUTANEOUS at 04:17

## 2018-06-03 RX ADMIN — PREDNISOLONE ACETATE 1 DROP: 1.2 SUSPENSION/ DROPS OPHTHALMIC at 22:41

## 2018-06-03 RX ADMIN — INSULIN ASPART 1 UNITS: 100 INJECTION, SOLUTION INTRAVENOUS; SUBCUTANEOUS at 20:52

## 2018-06-03 RX ADMIN — INSULIN ASPART 2 UNITS: 100 INJECTION, SOLUTION INTRAVENOUS; SUBCUTANEOUS at 11:17

## 2018-06-03 RX ADMIN — FLUTICASONE PROPIONATE 1 SPRAY: 50 SPRAY, METERED NASAL at 08:12

## 2018-06-03 RX ADMIN — INSULIN ASPART 1 UNITS: 100 INJECTION, SOLUTION INTRAVENOUS; SUBCUTANEOUS at 07:21

## 2018-06-03 RX ADMIN — MELATONIN 6 MG: 3 TAB ORAL at 22:33

## 2018-06-03 RX ADMIN — INSULIN ASPART 1 UNITS: 100 INJECTION, SOLUTION INTRAVENOUS; SUBCUTANEOUS at 16:03

## 2018-06-03 RX ADMIN — HYDROMORPHONE HYDROCHLORIDE 0.2 MG: 1 INJECTION, SOLUTION INTRAMUSCULAR; INTRAVENOUS; SUBCUTANEOUS at 16:00

## 2018-06-03 RX ADMIN — MIRTAZAPINE 30 MG: 7.5 TABLET ORAL at 22:34

## 2018-06-03 RX ADMIN — TORSEMIDE 10 MG: 10 TABLET ORAL at 08:12

## 2018-06-03 RX ADMIN — Medication 1 CAPSULE: at 22:33

## 2018-06-03 RX ADMIN — CIPROFLOXACIN HYDROCHLORIDE 250 MG: 250 TABLET, FILM COATED ORAL at 22:32

## 2018-06-03 RX ADMIN — Medication 1 CAPSULE: at 08:12

## 2018-06-03 RX ADMIN — PREDNISOLONE ACETATE 1 DROP: 1.2 SUSPENSION/ DROPS OPHTHALMIC at 08:12

## 2018-06-03 RX ADMIN — SIMETHICONE 40 MG: 20 EMULSION ORAL at 09:27

## 2018-06-03 RX ADMIN — POLYETHYLENE GLYCOL 3350 17 G: 17 POWDER, FOR SOLUTION ORAL at 00:52

## 2018-06-03 RX ADMIN — IPRATROPIUM BROMIDE AND ALBUTEROL SULFATE 3 ML: .5; 3 SOLUTION RESPIRATORY (INHALATION) at 07:39

## 2018-06-03 RX ADMIN — METOPROLOL TARTRATE 25 MG: 25 TABLET ORAL at 17:48

## 2018-06-03 RX ADMIN — RANITIDINE HYDROCHLORIDE 150 MG: 150 SOLUTION ORAL at 08:12

## 2018-06-03 RX ADMIN — IPRATROPIUM BROMIDE AND ALBUTEROL SULFATE 3 ML: .5; 3 SOLUTION RESPIRATORY (INHALATION) at 19:50

## 2018-06-03 NOTE — PROGRESS NOTES
[facility]    Daily Progress Note  [facility]       Date of Admission:  5/21/2018    Main Plan for day  1. Cipro for UTI  2. Hold anticoagulation pending discussion with family.    Assessment & Plan   Jacques Solis is a 88 year old female with past history A-fib, CHF, severe AS, DM II, asthma, chronic respiratory failure with vent dependence who was discharged home from [facility] on 5/18/18 but did not have appropriate home services arranged, but son also reporting a recent decline in respiratory status.     Acute on chronic hypoxic respiratory failure with vent dependence:  Chronic failure felt due to pleural effusions and overall debility. Pulmonologist at [facility] felt she would chronically be vent dependent and weaning efforts were stopped.  CT chest in ED showed small to moderated loculated bilateral pleural effusions with atelectasis.  Also a 4 cm gil opacity in left upper lobe, but no SIRS criteria present.  Tolerating moderate levels of PS wean for longer periods of time, will continue.    Ventilator status stable.  Tolerating PS 12 and has -270.  Will trial lower PS 10. Plan for TIW speaking valve while hospitalized (next tomorrow)  - suspect part of restriction from kyphosis   - will work to arrange discharge to home. Social work and care coordination involved    Paroxysmal A-fib:   Intermittent rapid - which is evolving from NSR   - continue PTA metoprolol (note son has been dosing this at 25 mg q8h) - required intermittent IV dose 5/30  - anticoagulation on hold, see below    Hx of Arterial Clot (LE) and Afib  - coumadin on hold given thigh hematoma   - benefit becoming less favorable given recurrent bleed - prior plan for home INR monitoring will need to be revisited prior to discharge- will discuss restarting warfarin with son today.    Severe AS  Chronic diastolic CHF  TTE 3/2018 with EF 55-60%, severe AS (area of 0.6-0.7) and mild regurgitation, moderate MR.   Is reportedly not a TAVR candidate.    - continue torsemide decrease dose to 10 mg daily   - follow I/Os keep even      DM II: Lantus 20 units daily with SSI,   -noted daughter prior  requests oral DM medications only except for sliding scale.     Asthma:  Continue PTA nebs.    Recent acute blood loss anemia with thigh hematomas (Confirmed on CT 5/31):  INR mildly elevated at 4 but prior reportedly due to supratherapeutic lovenox in setting of PAULY.  Hgb ~ 8.4 with drop 5/29 with agitation, transfused to ~8 and has been stable for past 3 checks.  - INR trending down, 1.5 today  - serial CBC  - hold anticoagulation, will need to discuss long-term safety of anticoagulation      Delirium - with noted encephalopathy -   Discharge summary states probable encephalopathy due to critical illness superimposed on some dementia.  Son denies any history of dementia, patient following commands but not oriented to place or time.  - continue PTA Seroquel PRN - increase dose to 12.5   - remeron 30 mg  - depakote qhs    - Melatonin 6mg QHS    Nonhealing right leg wound  Due to hematoma (from prior intervention 9/2017) with necrotic eschar now s/p debridement  - WOC consult following     Renal-  Non oliguric PAULY  - Cr rise times with acute bleed with hypotension   Morris removed (discontinued per family's request) and replaced 6/1 given ongoing skin breakdown and difficulty monitoring UOP.  Most recent culture with predominant GNR >100K CFU- will start cipro, narrow when culture results finalize.  - follow Cr, UOP, maintain hemodynamics   - Cipro for probable UTI.    Dysphagia  - continue PTA tube feeds, currently at goal   - Speaking valve TIW. Needs cues to cough, swallow, significant pooling of secretions in mouth.    Mild abdominal distension  -possible adynamic ileus, (chronic per family)  tube feeds for emesis 5/30 - tolerating thus far    -simethicone prn    DVT Prophylaxis: Warfarin - on hold         Disposition: Expected  "discharge as soon as acute issues stabilized     Debra Lazar    Primary Care Physician   Michael Mccarthy     24 hour events:  Seen early morning. Attempted to speak but speaking valve not in place. Pointing at white board and TV screen. Afebrile, I/O even,        Chief Complaint   Lack of home resources for management    History is obtained from the patient's son (who is an anesthesiologist) and chart review. Recent discharge summary from [place] said the following \"87-year-old female well-known to the critical care service with a history of some mild memory impairment, but has been essentially hospital-dependent for the last 9 months with severe aortic stenosis, not a candidate for TAVR, chronic diastolic heart failure, chronic bilateral recurrent pleural effusions, chronic vent dependence, history of pulmonary embolism, atrial fibrillation, peripheral artery disease with history of critical limb ischemia, who was transferred to [facility] from a long-term care facility. She was transferred due to acute blood loss anemia, elevated leukocytosis and acute renal failure. She was recently started on tobramycin for pseudomonas growing in her sputum.\"      History of Present Illness   Jcaques Solis is a 88 year old female who presents with inadequate home support and possible acute on chronic hypoxic respiratory failure.  She has essentially been hospitalized since September 2017 when she was admitted to [facility] for critical limb ischemia and underwent balloon angioplasty which was complicated by femoral artery perforation with hemorrhagic shock.  She also had chronic respiratory and was unable to wean from the vent and was trached and pegged on 11/13/17.  She was also noted to have some encephalopathy.  Ultimately she was discharged to [facility]. She has essentially moved back and forth from [facility] and [facility] and then to [facility] ever since.  Her most recent admission to [facility] " 4/26-5/4 was due to acute blood loss anemia due to left thigh hematoma in addition to renal failure due to tobramycin toxicity for multi-drug resistant Pseudomonas.  She also underwent debridement of a right lower extremity wound due to an infected hematoma.  She was ultimately discharged to [facility], where she has stayed until discharge home on 5/18/18.  Pulmonology felt that she would not be able to wean and recommended discharge home or to TCU and son elected for discharge home.      He reports he was not set up with anyone who would be able to manage a home vent, reports not being given detailed sliding scale insulin instructions (no dosage parameters), was unable to obtain nebs due to lack of appropriate ICD code on discharge orders, did not have supplies at home to replace clogged tube feeding piece in addition to concerns about the competency of the home nurse.  Due to these issues he presented to [facility], unclear as to why he did not return to [facility], which is very familiar with the patient's case.  He reports his mother has otherwise been stable with the exception that she experienced several acute desaturation events over the past 2 days which resolved with suctioning.  He reports about 1 month ago she was able to tolerate being off the ventilator for about 16 hours/day and was able to speak with passy wilner valve.    Son reports his goals are for his mother to be able to wean from the vent for at least a few hours and be able to speak with valve and possible eat some food by mouth.  He understands that she may never be able to fully wean from the vent and is accepting of this, however, he wants any reversible conditions treated.            Allergies   Allergies   Allergen Reactions     Amikacin      Amiodarone      Codeine      Dexmedetomidine      bradycardia     Lisinopril      Penicillins      Sulfa Drugs            Physical Exam   Temp: 98.6  F (37  C) Temp src: Bladder BP: 113/58   Heart Rate: 79  Resp: 13 SpO2: 94 % O2 Device: Mechanical Ventilator    Vital Signs with Ranges  Temp:  [97.4  F (36.3  C)-98.6  F (37  C)] 98.6  F (37  C)  Heart Rate:  [] 79  Resp:  [13-29] 13  BP: ()/(47-98) 113/58  FiO2 (%):  [30 %-40 %] 30 %  SpO2:  [93 %-100 %] 94 %  194 lbs .08 oz    Constitutional:  Elderly female appears stated age   Eyes: pupils equal, round and reactive to light,  HEENT:  trach in place   Respiratory: lungs coarse to auscultation bilaterally, synchronous with vent  Cardiovascular: RR rhythm, normal S1/S2, no murmur, rubs or gallops  GI: abdomen soft, non-tender, moldly distended, normal bowel sounds, no hepatosplenomegaly, PEG tube in place,  Extremities : thigh edema bilaterally (mild pitting). Left knee warm to palpation but no erythema. DP pulses palpable bilaterally. Right lower leg wound covered with dressing  Skin: dressing over skin wound right leg.   Neuro: intermittently following commands , moving all 4 extremities    I  Data   Data reviewed today:  I personally reviewed no images or EKG's today.    Recent Labs  Lab 06/03/18  0420 06/02/18  0425 06/01/18  1705 06/01/18  1430 06/01/18  0510  05/30/18 2000   WBC 11.9* 11.2*  --  11.0 13.3*  < >  --    HGB 9.2* 8.7*  --  9.0* 7.6*  < >  --    MCV 90 88  --  88 88  < >  --     175  --  188 208  < >  --    INR 1.52* 1.61*  --   --  2.44*  < >  --     136 136  --  137  < > 139   POTASSIUM 4.2 4.5 5.0  --  5.5*  < > 4.9   CHLORIDE 98 98 96  --  97  < > 100   CO2 32 34* 32  --  30  < > 33*   BUN 77* 88* 81*  --  81*  < > 70*   CR 1.50* 1.75* 1.77*  --  1.69*  < > 1.29*   ANIONGAP 8 4 8  --  10  < > 6   KRISTOFER 8.2* 8.3* 8.4*  --  8.2*  < > 8.4*   * 174* 143*  --  186*  < > 163*   ALBUMIN  --  1.9*  --   --   --   --  2.2*   PROTTOTAL  --  5.4*  --   --   --   --  5.9*   BILITOTAL  --  0.7  --   --   --   --  0.5   ALKPHOS  --  172*  --   --   --   --  148   ALT  --  28  --   --   --   --  42   AST  --  25  --   --   --   --   40   < > = values in this interval not displayed.    No results found for this or any previous visit (from the past 24 hour(s)).    35 min CCT

## 2018-06-03 NOTE — PROGRESS NOTES
[facility] ICU RESPIRATORY NOTE  Date of Admission: 5/21/2018  Date of Intubation (most recent): (5/21/201) vent dependence pt   Reason for Mechanical Ventilation: Acute on chronic hypoxic respiratory failure with vent dependence  Number of Days on Mechanical Ventilation:   Met Criteria for Pressure Support Trial:No  Reason for No Pressure Support Trial: Rest overnight     Significant Events Today:None      Ventilation Mode: CMV/AC  (Continuous Mandatory Ventilation/ Assist Control)  FiO2 (%): 30 %  Rate Set (breaths/minute): 8 breaths/min  Tidal Volume Set (mL): 360 mL  PEEP (cm H2O): 5 cmH2O  Pressure Support (cm H2O): 10 cmH2O  Oxygen Concentration (%): 30 %  Resp: 16    ABG Results:  No lab results found in last 7 days.    ETT appearance on chest x-ray: Trach in place    Plan:  Pt will remain on full vent support.    Esthela Guzmán RT  6/3/2018

## 2018-06-03 NOTE — PROGRESS NOTES
CM    I:  JIMBO consult for d/c planning remains pending. Physical Therapy is currently recommending home with Home PT;   SLP is recommending: TCU/LTACH.  Will await a clearer plan of care from therapies while pt progresses.    P:  Continue to follow and assist as needed.    CAMMY Ambrocio

## 2018-06-03 NOTE — PROGRESS NOTES
Issues with desaturation and tachycardia this morning.    Pt turned to right side, noted to desat on 30% FIO2 to low 80s, also note HR >130 (mostly fib, 3 beat run VT, also occasional sinus tach) with hypertension. Pt with increased respiratory effort, burping.    Pt's metoprolol was held due to hypotension (50mg Q8H) so resumed at 25mg Q8H, ordered simethacone and polyethylene glycol. Given quetiapine for increased anxiety.    CXR essentially unchanged (by my review).    HR stabilizing, SPO2 high 90s with higher FIO2.     Starting cipro for UTI by culture.

## 2018-06-03 NOTE — PROGRESS NOTES
[facility] ICU RESPIRATORY NOTE  Date of Admission: 5/21/2018  Date of Intubation (most recent):5/21/2018  Reason for Mechanical Ventilation:Acute on Chronic resp. failure  Number of Days on Mechanical Ventilation:    Met Criteria for Pressure Support Trial:Yes  Length of Pressure Support Trial:PS 12/5 for 15 minutes  Reason for Stopping Pressure Support Trial:SpO2 dropped into the mid 80's    Ventilation Mode: CMV/AC  (Continuous Mandatory Ventilation/ Assist Control)  FiO2 (%): 30 %  Rate Set (breaths/minute): 8 breaths/min  Tidal Volume Set (mL): 360 mL  PEEP (cm H2O): 5 cmH2O  Pressure Support (cm H2O): 12 cmH2O  Oxygen Concentration (%): 30 %  Resp: 17      Significant Events Today:SpO2 dropped to 85% this morning. FiO2 was increased to 50%. SpO2 then increased into the mid 90's. FiO2 was titrated back down to 30% throughout the day with SpO2 in the mid 90's.     ABG Results:No new results    Plan:  Will cont full vent support overnight and assess for a daily PS trial in the morning.  6/3/2018  Cely Valentin RRT

## 2018-06-03 NOTE — PLAN OF CARE
Problem: Patient Care Overview  Goal: Plan of Care/Patient Progress Review  Patient on ventilator support throughout shift. Patient in Normal Sinus Rhythm with PAC's HR in 80's. Patient hgb 9.2 this morning. INR down to 1.52.Tolerating TF at goal rate. Patient making adequate urine. VSS will continue to monitor. Patient and care provider updated on plan of care.

## 2018-06-04 ENCOUNTER — TELEPHONE (OUTPATIENT)
Dept: FAMILY MEDICINE | Facility: CLINIC | Age: 83
End: 2018-06-04

## 2018-06-04 ENCOUNTER — APPOINTMENT (OUTPATIENT)
Dept: SPEECH THERAPY | Facility: CLINIC | Age: 83
DRG: 207 | End: 2018-06-04
Payer: MEDICARE

## 2018-06-04 LAB
ANION GAP SERPL CALCULATED.3IONS-SCNC: 7 MMOL/L (ref 3–14)
BUN SERPL-MCNC: 73 MG/DL (ref 7–30)
CALCIUM SERPL-MCNC: 8.4 MG/DL (ref 8.5–10.1)
CHLORIDE SERPL-SCNC: 97 MMOL/L (ref 94–109)
CO2 SERPL-SCNC: 34 MMOL/L (ref 20–32)
CREAT SERPL-MCNC: 1.39 MG/DL (ref 0.52–1.04)
ERYTHROCYTE [DISTWIDTH] IN BLOOD BY AUTOMATED COUNT: 17.5 % (ref 10–15)
GFR SERPL CREATININE-BSD FRML MDRD: 36 ML/MIN/1.7M2
GLUCOSE BLDC GLUCOMTR-MCNC: 113 MG/DL (ref 70–99)
GLUCOSE BLDC GLUCOMTR-MCNC: 121 MG/DL (ref 70–99)
GLUCOSE BLDC GLUCOMTR-MCNC: 129 MG/DL (ref 70–99)
GLUCOSE BLDC GLUCOMTR-MCNC: 136 MG/DL (ref 70–99)
GLUCOSE BLDC GLUCOMTR-MCNC: 143 MG/DL (ref 70–99)
GLUCOSE BLDC GLUCOMTR-MCNC: 99 MG/DL (ref 70–99)
GLUCOSE SERPL-MCNC: 111 MG/DL (ref 70–99)
HCT VFR BLD AUTO: 30.2 % (ref 35–47)
HGB BLD-MCNC: 9.5 G/DL (ref 11.7–15.7)
INR PPP: 1.53 (ref 0.86–1.14)
MCH RBC QN AUTO: 28.7 PG (ref 26.5–33)
MCHC RBC AUTO-ENTMCNC: 31.5 G/DL (ref 31.5–36.5)
MCV RBC AUTO: 91 FL (ref 78–100)
PLATELET # BLD AUTO: 227 10E9/L (ref 150–450)
POTASSIUM SERPL-SCNC: 3.7 MMOL/L (ref 3.4–5.3)
RBC # BLD AUTO: 3.31 10E12/L (ref 3.8–5.2)
SODIUM SERPL-SCNC: 138 MMOL/L (ref 133–144)
WBC # BLD AUTO: 16.8 10E9/L (ref 4–11)

## 2018-06-04 PROCEDURE — 25000128 H RX IP 250 OP 636: Performed by: INTERNAL MEDICINE

## 2018-06-04 PROCEDURE — 25000132 ZZH RX MED GY IP 250 OP 250 PS 637: Mod: GY | Performed by: NURSE PRACTITIONER

## 2018-06-04 PROCEDURE — 40000225 ZZH STATISTIC SLP WARD VISIT: Performed by: SPEECH-LANGUAGE PATHOLOGIST

## 2018-06-04 PROCEDURE — 80048 BASIC METABOLIC PNL TOTAL CA: CPT | Performed by: HOSPITALIST

## 2018-06-04 PROCEDURE — 40000008 ZZH STATISTIC AIRWAY CARE

## 2018-06-04 PROCEDURE — A9270 NON-COVERED ITEM OR SERVICE: HCPCS | Mod: GY | Performed by: INTERNAL MEDICINE

## 2018-06-04 PROCEDURE — 00000146 ZZHCL STATISTIC GLUCOSE BY METER IP

## 2018-06-04 PROCEDURE — 25000132 ZZH RX MED GY IP 250 OP 250 PS 637: Mod: GY | Performed by: INTERNAL MEDICINE

## 2018-06-04 PROCEDURE — 20000003 ZZH R&B ICU

## 2018-06-04 PROCEDURE — 94003 VENT MGMT INPAT SUBQ DAY: CPT

## 2018-06-04 PROCEDURE — 94640 AIRWAY INHALATION TREATMENT: CPT | Mod: 76

## 2018-06-04 PROCEDURE — 85027 COMPLETE CBC AUTOMATED: CPT | Performed by: HOSPITALIST

## 2018-06-04 PROCEDURE — A9270 NON-COVERED ITEM OR SERVICE: HCPCS | Mod: GY | Performed by: HOSPITALIST

## 2018-06-04 PROCEDURE — A9270 NON-COVERED ITEM OR SERVICE: HCPCS | Mod: GY | Performed by: NURSE PRACTITIONER

## 2018-06-04 PROCEDURE — 25000125 ZZHC RX 250: Performed by: INTERNAL MEDICINE

## 2018-06-04 PROCEDURE — 85610 PROTHROMBIN TIME: CPT | Performed by: HOSPITALIST

## 2018-06-04 PROCEDURE — 92507 TX SP LANG VOICE COMM INDIV: CPT | Mod: GN | Performed by: SPEECH-LANGUAGE PATHOLOGIST

## 2018-06-04 PROCEDURE — 94640 AIRWAY INHALATION TREATMENT: CPT

## 2018-06-04 PROCEDURE — 40000239 ZZH STATISTIC VAT ROUNDS

## 2018-06-04 PROCEDURE — 99291 CRITICAL CARE FIRST HOUR: CPT | Performed by: INTERNAL MEDICINE

## 2018-06-04 PROCEDURE — 25000132 ZZH RX MED GY IP 250 OP 250 PS 637: Mod: GY | Performed by: HOSPITALIST

## 2018-06-04 PROCEDURE — 25000131 ZZH RX MED GY IP 250 OP 636 PS 637: Mod: GY | Performed by: NURSE PRACTITIONER

## 2018-06-04 PROCEDURE — 40000275 ZZH STATISTIC RCP TIME EA 10 MIN

## 2018-06-04 RX ORDER — TORSEMIDE 10 MG/1
10 TABLET ORAL
Status: DISCONTINUED | OUTPATIENT
Start: 2018-06-04 | End: 2018-06-14 | Stop reason: HOSPADM

## 2018-06-04 RX ORDER — CHLORHEXIDINE GLUCONATE ORAL RINSE 1.2 MG/ML
15 SOLUTION DENTAL 2 TIMES DAILY
Status: DISCONTINUED | OUTPATIENT
Start: 2018-06-04 | End: 2018-06-14 | Stop reason: HOSPADM

## 2018-06-04 RX ORDER — WARFARIN SODIUM 2 MG/1
2 TABLET ORAL
Status: COMPLETED | OUTPATIENT
Start: 2018-06-04 | End: 2018-06-04

## 2018-06-04 RX ORDER — WARFARIN SODIUM 2.5 MG/1
2 TABLET ORAL
Status: DISCONTINUED | OUTPATIENT
Start: 2018-06-04 | End: 2018-06-04 | Stop reason: DRUGHIGH

## 2018-06-04 RX ADMIN — PREDNISOLONE ACETATE 1 DROP: 1.2 SUSPENSION/ DROPS OPHTHALMIC at 21:33

## 2018-06-04 RX ADMIN — HYDROMORPHONE HYDROCHLORIDE 0.2 MG: 1 INJECTION, SOLUTION INTRAMUSCULAR; INTRAVENOUS; SUBCUTANEOUS at 02:29

## 2018-06-04 RX ADMIN — METOPROLOL TARTRATE 25 MG: 25 TABLET ORAL at 00:36

## 2018-06-04 RX ADMIN — TORSEMIDE 10 MG: 10 TABLET ORAL at 08:10

## 2018-06-04 RX ADMIN — Medication 1 CAPSULE: at 08:12

## 2018-06-04 RX ADMIN — IPRATROPIUM BROMIDE AND ALBUTEROL SULFATE 3 ML: .5; 3 SOLUTION RESPIRATORY (INHALATION) at 12:55

## 2018-06-04 RX ADMIN — TORSEMIDE 10 MG: 10 TABLET ORAL at 17:18

## 2018-06-04 RX ADMIN — CHLORHEXIDINE GLUCONATE 15 ML: 1.2 RINSE ORAL at 21:35

## 2018-06-04 RX ADMIN — MELATONIN 6 MG: 3 TAB ORAL at 21:27

## 2018-06-04 RX ADMIN — RANITIDINE HYDROCHLORIDE 150 MG: 150 SOLUTION ORAL at 08:10

## 2018-06-04 RX ADMIN — IPRATROPIUM BROMIDE AND ALBUTEROL SULFATE 3 ML: .5; 3 SOLUTION RESPIRATORY (INHALATION) at 07:35

## 2018-06-04 RX ADMIN — CHLORHEXIDINE GLUCONATE 15 ML: 1.2 RINSE ORAL at 12:22

## 2018-06-04 RX ADMIN — IPRATROPIUM BROMIDE AND ALBUTEROL SULFATE 3 ML: .5; 3 SOLUTION RESPIRATORY (INHALATION) at 19:19

## 2018-06-04 RX ADMIN — METOPROLOL TARTRATE 25 MG: 25 TABLET ORAL at 08:10

## 2018-06-04 RX ADMIN — FLUTICASONE PROPIONATE 1 SPRAY: 50 SPRAY, METERED NASAL at 08:13

## 2018-06-04 RX ADMIN — CIPROFLOXACIN HYDROCHLORIDE 250 MG: 250 TABLET, FILM COATED ORAL at 08:10

## 2018-06-04 RX ADMIN — Medication 1 CAPSULE: at 21:28

## 2018-06-04 RX ADMIN — PREDNISOLONE ACETATE 1 DROP: 1.2 SUSPENSION/ DROPS OPHTHALMIC at 08:00

## 2018-06-04 RX ADMIN — Medication 12.5 MG: at 01:28

## 2018-06-04 RX ADMIN — MIRTAZAPINE 30 MG: 7.5 TABLET ORAL at 21:27

## 2018-06-04 RX ADMIN — VALPROIC ACID 125 MG: 250 SOLUTION ORAL at 21:26

## 2018-06-04 RX ADMIN — Medication: at 14:43

## 2018-06-04 RX ADMIN — INSULIN ASPART 1 UNITS: 100 INJECTION, SOLUTION INTRAVENOUS; SUBCUTANEOUS at 07:54

## 2018-06-04 RX ADMIN — INSULIN GLARGINE 20 UNITS: 100 INJECTION, SOLUTION SUBCUTANEOUS at 21:41

## 2018-06-04 RX ADMIN — METOPROLOL TARTRATE 25 MG: 25 TABLET ORAL at 17:18

## 2018-06-04 RX ADMIN — CIPROFLOXACIN HYDROCHLORIDE 250 MG: 250 TABLET, FILM COATED ORAL at 21:27

## 2018-06-04 RX ADMIN — WARFARIN SODIUM 2 MG: 2 TABLET ORAL at 18:21

## 2018-06-04 NOTE — PROGRESS NOTES
Care Coordination:    Pt's brother Yariel called ([phone]) to check on status of air mattress and INR machine.  + Per Judie's documentation, Rx for INR machine was faxed last week; air mattress is not covered by insurance but she did request a private pay quote from [employer].  Care Coordinator Jaylyn will look in to this.     Pt's sister Jen ([phone]) called, stating Judie gave her Dr. Olivia Gannon's office number ([phone]), and the plan was to see if pt could be seen at the hospital by Dr. Gannon on Wed or Thurs to establish care given the complexity of going to an appointment with a ventilator.    + Per chart review, daughter has called and Jacques Soto has sent a message to the team to inquire  + I also called [facility] and talked to the Care Coordinator there, she will have this on her radar.  + I called Jen back to let her know the above.    Aliya Grijalva RN, BSN  [facility] Care Coordinator   Mobile Phone: [phone]

## 2018-06-04 NOTE — PLAN OF CARE
Problem: Patient Care Overview  Goal: Plan of Care/Patient Progress Review  Outcome: No Change  Pt tolerated CPAP 10/5 for most of the day. Lungs clear, diminished with small amounts of white/yellow secretions per ETT. Pt up to chair for 5 hours. Pt started on coumadin this evening. Pt's caregiver and son at bedside - updated on plan of care. Cont to monitor.

## 2018-06-04 NOTE — PROGRESS NOTES
CLINICAL NUTRITION SERVICES - REASSESSMENT NOTE      Malnutrition:   (5/22)  % Weight Loss:  None noted  % Intake:  No decreased intake noted  Subcutaneous Fat Loss:  None observed  Muscle Loss:  None observed  Fluid Retention:  None noted      Malnutrition Diagnosis: Patient does not meet two of the above criteria necessary for diagnosing malnutrition       EVALUATION OF PROGRESS TOWARD GOALS   Diet: NPO on vent    Nutrition Support:  Pt has been receiving goal TF since 5/22 (was briefly off between 5/30-5/31). Goal TF is running (per home regimen) as follows:    Nutrition Support Enteral:  Type of Feeding Tube: G-tube   Enteral Frequency:  Continuous  Enteral Regimen: Nepro at 35 mL/hr   Total Enteral Provisions: 1512 kcal (24 kcal/kg), 68 g protein (1.1 g/kg), 135 g CHO, 11 g fiber, 613 mL H2O  Free Water Flush: 70 mL every 4 hours     Intake/Tolerance:   BGM: 171, 136, 113, 111, 143 (improving)  BM x 4 yesterday   Weight 90.1 kg (up 6 kg since admit)   BUN 73 (H, improving), Cr 1.39 (H, improving) (CKD)    ASSESSED NUTRITION NEEDS PER APPROVED PRACTICE GUIDELINES:      Dosing Weight 61.8 kg   Estimated Energy Needs: 9338-0522 kcals (20-25 Kcal/Kg)  Justification: obese and vented  Estimated Protein Needs: 50-75 grams protein (0.8-1.2 g pro/Kg)  Justification: CKD      NEW FINDINGS:   6/1: Continuing bleed from leg hematoma confirmed on CT. INR trending down. (anticoagulation held)  6/2: Passy Anderson Valve trial. Per SLP, Recommend PMV with RT and SLP supervision only at this time. Plan for swallow study and trial when improved PMV established.     Previous Goals:   Patient will receive nutrition within the next 24-48 hours   Evaluation: Met    Previous Nutrition Diagnosis:   Inadequate protein-energy intake related to NPO, TF on hold as evidenced by meeting 0% needs   Evaluation: Completed      CURRENT NUTRITION DIAGNOSIS  No nutrition diagnosis identified at this time    INTERVENTIONS  Recommendations / Nutrition  Prescription  Continue home TF regimen     Implementation  Collaboration and Referral of Nutrition care: Dicussed pt POC during ICU rounds this morning     Goals  TF will meet % of estimated needs      MONITORING AND EVALUATION:  Progress towards goals will be monitored and evaluated per protocol and Practice Guidelines          Madeline Crawford, Casual/Relief Dietitian

## 2018-06-04 NOTE — PLAN OF CARE
Problem: Patient Care Overview  Goal: Plan of Care/Patient Progress Review    Discharge Planner SLP   Patient plan for discharge: Unable to state  Current status: : Passy Anuel Valve (PMV) communication Tx was provided this pm with RT assistance.  Patient tolerated the PMV for 20 minutes in line with the ventilator on pressure support mode with stable saturation levels above 92%. RR was noted to be 19-20 during the speaking trial.  Good voicing was achieved with decreased speech intelligibility at times due to fast rate of speech and poor ability to use speech strategies.  Patient was confused and very distracted with hallucinations throughout the session.  Patient stated seeing the following hallucinations/scenarios: a baby, red bag, ready to go downstairs, having a party, etc when not present.  Max cues were needed to attend to 1 step directions and basic questions.  Patient did not follow speech strategies despite repeated cues. Patient did not swallow on command after oral swabs. Patient was slightly agitated during the session.  Plan to continue PMV trial in SLP communication Tx with RT assist.  Recommend PMV with RT and SLP supervision only at this time.  Barriers to return to prior living situation: Per overall medical care needs/PT needs and resources available  Recommendations for discharge: Per overall medical care needs/PT needs and resources available; SLP services after discharge  Rationale for recommendations: SLP eval and Tx to maximize communication with PMV and complete swallow eval and Tx when improved PMV established and patient following commands       Entered by: Radha Sorenson 06/04/2018 2:09 PM

## 2018-06-04 NOTE — TELEPHONE ENCOUNTER
I'm sorry, but my practice is quite full. I appreciate the offer.  It looks like Dr. Grace is listed as having a f/u hospitalization appointment with her but I wonder if patient will d/c home or to a rehab facility. I don't know if he feels comfortable taking care of a vented patient either - you'd have to ask him that question.

## 2018-06-04 NOTE — PLAN OF CARE
Problem: Patient Care Overview  Goal: Plan of Care/Patient Progress Review  Outcome: No Change  Slept intermittently. Easily rousable but orientation is not able to assess due to trach. Agitated at times. Seroquel PRN given with some effect. Ventilator settings unchanged. Suctioned a few times with thick white-yellow secretions. HR irregular. Afib vs sinus arrhythmia. BP stable. Afebrile. On continuous G-tube feeding. No BM. Normal urine output.

## 2018-06-04 NOTE — TELEPHONE ENCOUNTER
Reason for call:  Other   Patient called regarding (reason for call): call back  Additional comments: Patient daughter virgil would like to know if doctor regulo dan would come to the hospital to see her due to her being hard to move and get around and will be discharged Wednesday or Thursday and think she is in room 356 ? icu     Phone number to reach patient: [phone]    Best Time:  Anytime     Can we leave a detailed message on this number?  YES

## 2018-06-04 NOTE — PROGRESS NOTES
Care Coordination:    Spoke with Cayden from [facility], purchase price of a Drive Mattress, [employer], would be $279.89.      Spoke with daughter, Jen, regarding cost of mattress.  Will discuss with son, Yariel, call back.

## 2018-06-04 NOTE — PROGRESS NOTES
[facility] ICU RESPIRATORY NOTE  Date of Admission: 5/21/2018  Date of Intubation (most recent):Chronic trach  Reason for Mechanical Ventilation:Acute on chronic resp failure, vent dependent  Number of Days on Mechanical Ventilation:  Met Criteria for Pressure Support Trial:Yes  Length of Pressure Support Trial:PS 12/5 for 1.5 hrs, decreased to 10/5 at 1130  Reason for Stopping Pressure Support Trial:Pt is still on PS at this time    Ventilation Mode: CPAP/PS  (Continuous positive airway pressure with Pressure Support)  FiO2 (%): 30 %  Rate Set (breaths/minute): 8 breaths/min  Tidal Volume Set (mL): 360 mL  PEEP (cm H2O): 5 cmH2O  Pressure Support (cm H2O): 10 cmH2O  Oxygen Concentration (%): 30 %  Resp: 31      Significant Events Today:PMV for 20 minutes on PS 10/5    ABG Results:No new results    Plan:  Plan to place pt back on CMV settings later on this evening to rest overnight. Will then assess for another PS trial in the morning.  6/4/2018  Cely Valentin RRT

## 2018-06-04 NOTE — PROGRESS NOTES
[facility]  Date of Admission: 5/21/2018  Date of Intubation (most recent): 5/21/2018 recent intubation   Reason for Mechanical Ventilation: Acute on Chronic resp. Failure, vent dependent  Number of Days on Mechanical Ventilation: vent dependent pt   Reason for No Pressure Support Trial: Rest overnight    Significant Events Today: None     Ventilation Mode: CMV/AC  (Continuous Mandatory Ventilation/ Assist Control)  FiO2 (%): 30 %  Rate Set (breaths/minute): 8 breaths/min  Tidal Volume Set (mL): 360 mL  PEEP (cm H2O): 5 cmH2O  Pressure Support (cm H2O): 12 cmH2O  Oxygen Concentration (%): 30 %  Resp: 27    ABG Results:  No lab results found in last 7 days.    Plan: Pt to remains on full vent support.    Esthela Guzmán RT  6/4/2018

## 2018-06-04 NOTE — PROGRESS NOTES
[facility]    Daily Progress Note  [facility]       Date of Admission:  5/21/2018    Main Plan for day  1. Cipro for UTI  2. Coumadin consult - restart goal 2-3 INR  3. Increase torsemide to bid (home regimen)     Assessment & Plan   Jacques Solis is a 88 year old female with past history A-fib, CHF, severe AS, DM II, asthma, chronic respiratory failure with vent dependence who was discharged home from [facility] on 5/18/18 but did not have appropriate home services arranged, but son also reporting a recent decline in respiratory status.     Acute on chronic hypoxic respiratory failure with vent dependence:  Chronic failure felt due to pleural effusions and overall debility. Pulmonologist at [facility] felt she would chronically be vent dependent and weaning efforts were stopped.  CT chest in ED showed small to moderated loculated bilateral pleural effusions with atelectasis.  Also a 4 cm gil opacity in left upper lobe, but no SIRS criteria present.  Tolerating moderate levels of PS wean for longer periods of time, will continue.    Ventilator status stable.  Tolerating PS 12 and has -270.  Will trial lower PS 10. Plan for TIW speaking valve while hospitalized (next tomorrow)  - suspect part of restriction from kyphosis   - will work to arrange discharge to home. Social work and care coordination involved    Paroxysmal A-fib:   Intermittent rapid - which is evolving from NSR   - continue PTA metoprolol (note son has been dosing this at 25 mg q8h) - required intermittent IV dose 5/30  - anticoagulation on hold, see below    Hx of Arterial Clot (LE) and Afib  - coumadin on hold given thigh hematoma   - benefit becoming less favorable given recurrent bleed - prior plan for home INR monitoring will need to be revisited prior to discharge- will discuss restarting warfarin with son today.    Severe AS  Chronic diastolic CHF  TTE 3/2018 with EF 55-60%, severe AS (area of 0.6-0.7) and  mild regurgitation, moderate MR.  Is reportedly not a TAVR candidate.    - continue torsemide increase back to home BID dosing 10 mg   - follow I/Os keep even      DM II: Lantus 20 units daily with SSI,   -noted daughter prior  requests oral DM medications only except for sliding scale.     Asthma:  Continue PTA nebs.    Recent acute blood loss anemia with thigh hematomas (Confirmed on CT 5/31):  INR mildly elevated at 4 but prior reportedly due to supratherapeutic lovenox in setting of PAULY.  Hgb ~ 8.4 with drop 5/29 with agitation, transfused to ~8 and has been stable for past 3 checks.  - INR trending down, 1.53 - stable   - serial CBC  - restart coumadin     Delirium - with noted encephalopathy -   Discharge summary states probable encephalopathy due to critical illness superimposed on some dementia.  Son denies any history of dementia, patient following commands but not oriented to place or time.  - continue PTA Seroquel PRN with increased 12.5   - remeron 30 mg  - depakote qhs   - Melatonin 6mg QHS    Nonhealing right leg wound  Due to hematoma (from prior intervention 9/2017) with necrotic eschar now s/p debridement  - WOC consult following     Renal-  Non oliguric PAULY  - Cr rise times with acute bleed with hypotension   Morris removed (discontinued per family's request) and replaced 6/1 given ongoing skin breakdown and difficulty monitoring UOP.  Most recent culture with predominant GNR >100K CFU- thus cipro started - appropriate - cx also showing enterococcus suspect contaminant   - follow Cr, UOP, maintain hemodynamics   - Cipro 5-7 day course   - may need to treat enterococcus.    Dysphagia  - continue PTA tube feeds, currently at goal   - Speaking valve TIW. Needs cues to cough, swallow, significant pooling of secretions in mouth.    Mild abdominal distension  -possible adynamic ileus, (chronic per family)  tube feeds for emesis 5/30 - tolerating thus far    -simethicone prn    DVT Prophylaxis: Warfarin -  "restart -       Disposition: Expected discharge as soon as acute issues stabilized     Abdoul Cardenas    Primary Care Physician   Michael Mccarthy     24 hour events:    - no major events   - cr improving , Hgb stable         Chief Complaint   Lack of home resources for management    History is obtained from the patient's son (who is an anesthesiologist) and chart review. Recent discharge summary from [place] said the following \"87-year-old female well-known to the critical care service with a history of some mild memory impairment, but has been essentially hospital-dependent for the last 9 months with severe aortic stenosis, not a candidate for TAVR, chronic diastolic heart failure, chronic bilateral recurrent pleural effusions, chronic vent dependence, history of pulmonary embolism, atrial fibrillation, peripheral artery disease with history of critical limb ischemia, who was transferred to [facility] from a long-term care facility. She was transferred due to acute blood loss anemia, elevated leukocytosis and acute renal failure. She was recently started on tobramycin for pseudomonas growing in her sputum.\"      History of Present Illness   Jacques Solis is a 88 year old female who presents with inadequate home support and possible acute on chronic hypoxic respiratory failure.  She has essentially been hospitalized since September 2017 when she was admitted to [facility] for critical limb ischemia and underwent balloon angioplasty which was complicated by femoral artery perforation with hemorrhagic shock.  She also had chronic respiratory and was unable to wean from the vent and was trached and pegged on 11/13/17.  She was also noted to have some encephalopathy.  Ultimately she was discharged to [place]. She has essentially moved back and forth from [facility] and [facility] and then to [facility] ever since.  Her most recent admission to [facility] 4/26-5/4 was due to acute blood loss anemia due to " left thigh hematoma in addition to renal failure due to tobramycin toxicity for multi-drug resistant Pseudomonas.  She also underwent debridement of a right lower extremity wound due to an infected hematoma.  She was ultimately discharged to [facility], where she has stayed until discharge home on 5/18/18.  Pulmonology felt that she would not be able to wean and recommended discharge home or to TCU and son elected for discharge home.      He reports he was not set up with anyone who would be able to manage a home vent, reports not being given detailed sliding scale insulin instructions (no dosage parameters), was unable to obtain nebs due to lack of appropriate ICD code on discharge orders, did not have supplies at home to replace clogged tube feeding piece in addition to concerns about the competency of the home nurse.  Due to these issues he presented to [facility], unclear as to why he did not return to [facility], which is very familiar with the patient's case.  He reports his mother has otherwise been stable with the exception that she experienced several acute desaturation events over the past 2 days which resolved with suctioning.  He reports about 1 month ago she was able to tolerate being off the ventilator for about 16 hours/day and was able to speak with passy wilner valve.    Son reports his goals are for his mother to be able to wean from the vent for at least a few hours and be able to speak with valve and possible eat some food by mouth.  He understands that she may never be able to fully wean from the vent and is accepting of this, however, he wants any reversible conditions treated.            Allergies   Allergies   Allergen Reactions     Amikacin      Amiodarone      Codeine      Dexmedetomidine      bradycardia     Lisinopril      Penicillins      Sulfa Drugs            Physical Exam   Temp: 98.7  F (37.1  C) Temp src: Axillary BP: 94/58   Heart Rate: 76 Resp: 25 SpO2: 98 % O2 Device: Mechanical Ventilator     Vital Signs with Ranges  Temp:  [97.6  F (36.4  C)-99  F (37.2  C)] 98.7  F (37.1  C)  Heart Rate:  [68-96] 76  Resp:  [13-34] 25  BP: ()/() 94/58  FiO2 (%):  [30 %-50 %] 30 %  SpO2:  [91 %-100 %] 98 %  198 lbs 10.15 oz    Constitutional:  Elderly female appears stated age   Eyes: pupils equal, round and reactive to light,  HEENT:  trach in place   Respiratory: lungs coarse to auscultation bilaterally, synchronous with vent  Cardiovascular: RR rhythm, normal S1/S2, no murmur, rubs or gallops  GI: abdomen soft, non-tender, mildly distended, normal bowel sounds, no hepatosplenomegaly, PEG tube in place,  Extremities : thigh edema bilaterally (mild pitting). Left knee warm to palpation but no erythema. DP pulses palpable bilaterally. Right lower leg wound covered with dressing  Skin: dressing over skin wound right leg.   Neuro: intermittently following commands , moving all 4 extremities    I  Data   Data reviewed today:  I personally reviewed no images or EKG's today.    Recent Labs  Lab 06/04/18  0400 06/03/18  0420 06/02/18  0425  05/30/18 2000   WBC 16.8* 11.9* 11.2*  < >  --    HGB 9.5* 9.2* 8.7*  < >  --    MCV 91 90 88  < >  --     201 175  < >  --    INR 1.53* 1.52* 1.61*  < >  --     138 136  < > 139   POTASSIUM 3.7 4.2 4.5  < > 4.9   CHLORIDE 97 98 98  < > 100   CO2 34* 32 34*  < > 33*   BUN 73* 77* 88*  < > 70*   CR 1.39* 1.50* 1.75*  < > 1.29*   ANIONGAP 7 8 4  < > 6   KRISTOFER 8.4* 8.2* 8.3*  < > 8.4*   * 148* 174*  < > 163*   ALBUMIN  --   --  1.9*  --  2.2*   PROTTOTAL  --   --  5.4*  --  5.9*   BILITOTAL  --   --  0.7  --  0.5   ALKPHOS  --   --  172*  --  148   ALT  --   --  28  --  42   AST  --   --  25  --  40   < > = values in this interval not displayed.    No results found for this or any previous visit (from the past 24 hour(s)).    35 min CCT

## 2018-06-05 ENCOUNTER — APPOINTMENT (OUTPATIENT)
Dept: PHYSICAL THERAPY | Facility: CLINIC | Age: 83
DRG: 207 | End: 2018-06-05
Payer: MEDICARE

## 2018-06-05 PROBLEM — Z71.89 ACP (ADVANCE CARE PLANNING): Chronic | Status: ACTIVE | Noted: 2018-06-05

## 2018-06-05 LAB
ANION GAP SERPL CALCULATED.3IONS-SCNC: 6 MMOL/L (ref 3–14)
BUN SERPL-MCNC: 67 MG/DL (ref 7–30)
CALCIUM SERPL-MCNC: 8.5 MG/DL (ref 8.5–10.1)
CHLORIDE SERPL-SCNC: 98 MMOL/L (ref 94–109)
CO2 SERPL-SCNC: 34 MMOL/L (ref 20–32)
CREAT SERPL-MCNC: 1.23 MG/DL (ref 0.52–1.04)
ERYTHROCYTE [DISTWIDTH] IN BLOOD BY AUTOMATED COUNT: 18 % (ref 10–15)
GFR SERPL CREATININE-BSD FRML MDRD: 41 ML/MIN/1.7M2
GLUCOSE BLDC GLUCOMTR-MCNC: 120 MG/DL (ref 70–99)
GLUCOSE BLDC GLUCOMTR-MCNC: 132 MG/DL (ref 70–99)
GLUCOSE BLDC GLUCOMTR-MCNC: 143 MG/DL (ref 70–99)
GLUCOSE BLDC GLUCOMTR-MCNC: 150 MG/DL (ref 70–99)
GLUCOSE BLDC GLUCOMTR-MCNC: 155 MG/DL (ref 70–99)
GLUCOSE BLDC GLUCOMTR-MCNC: 155 MG/DL (ref 70–99)
GLUCOSE BLDC GLUCOMTR-MCNC: 177 MG/DL (ref 70–99)
GLUCOSE SERPL-MCNC: 150 MG/DL (ref 70–99)
HCT VFR BLD AUTO: 29.7 % (ref 35–47)
HGB BLD-MCNC: 9.5 G/DL (ref 11.7–15.7)
INR PPP: 1.53 (ref 0.86–1.14)
MCH RBC QN AUTO: 29.3 PG (ref 26.5–33)
MCHC RBC AUTO-ENTMCNC: 32 G/DL (ref 31.5–36.5)
MCV RBC AUTO: 92 FL (ref 78–100)
PLATELET # BLD AUTO: 248 10E9/L (ref 150–450)
POTASSIUM SERPL-SCNC: 3.8 MMOL/L (ref 3.4–5.3)
RBC # BLD AUTO: 3.24 10E12/L (ref 3.8–5.2)
SODIUM SERPL-SCNC: 138 MMOL/L (ref 133–144)
WBC # BLD AUTO: 14.8 10E9/L (ref 4–11)

## 2018-06-05 PROCEDURE — 40000275 ZZH STATISTIC RCP TIME EA 10 MIN

## 2018-06-05 PROCEDURE — 97602 WOUND(S) CARE NON-SELECTIVE: CPT

## 2018-06-05 PROCEDURE — A9270 NON-COVERED ITEM OR SERVICE: HCPCS | Mod: GY | Performed by: HOSPITALIST

## 2018-06-05 PROCEDURE — A9270 NON-COVERED ITEM OR SERVICE: HCPCS | Mod: GY | Performed by: INTERNAL MEDICINE

## 2018-06-05 PROCEDURE — 25000128 H RX IP 250 OP 636: Performed by: INTERNAL MEDICINE

## 2018-06-05 PROCEDURE — 80048 BASIC METABOLIC PNL TOTAL CA: CPT | Performed by: HOSPITALIST

## 2018-06-05 PROCEDURE — 40000008 ZZH STATISTIC AIRWAY CARE

## 2018-06-05 PROCEDURE — 20000003 ZZH R&B ICU

## 2018-06-05 PROCEDURE — 94003 VENT MGMT INPAT SUBQ DAY: CPT

## 2018-06-05 PROCEDURE — A9270 NON-COVERED ITEM OR SERVICE: HCPCS | Mod: GY | Performed by: NURSE PRACTITIONER

## 2018-06-05 PROCEDURE — 25000125 ZZHC RX 250: Performed by: INTERNAL MEDICINE

## 2018-06-05 PROCEDURE — 85027 COMPLETE CBC AUTOMATED: CPT | Performed by: HOSPITALIST

## 2018-06-05 PROCEDURE — 25000132 ZZH RX MED GY IP 250 OP 250 PS 637: Mod: GY | Performed by: NURSE PRACTITIONER

## 2018-06-05 PROCEDURE — 40000257 ZZH STATISTIC CONSULT NO CHARGE VASC ACCESS

## 2018-06-05 PROCEDURE — 99291 CRITICAL CARE FIRST HOUR: CPT | Performed by: INTERNAL MEDICINE

## 2018-06-05 PROCEDURE — G0463 HOSPITAL OUTPT CLINIC VISIT: HCPCS

## 2018-06-05 PROCEDURE — 25000131 ZZH RX MED GY IP 250 OP 636 PS 637: Mod: GY | Performed by: NURSE PRACTITIONER

## 2018-06-05 PROCEDURE — 40000239 ZZH STATISTIC VAT ROUNDS

## 2018-06-05 PROCEDURE — 85610 PROTHROMBIN TIME: CPT | Performed by: HOSPITALIST

## 2018-06-05 PROCEDURE — 27210432 ZZH NUTRITION PRODUCT RENAL BASIC LITER

## 2018-06-05 PROCEDURE — 25000132 ZZH RX MED GY IP 250 OP 250 PS 637: Mod: GY | Performed by: HOSPITALIST

## 2018-06-05 PROCEDURE — 25000132 ZZH RX MED GY IP 250 OP 250 PS 637: Mod: GY | Performed by: INTERNAL MEDICINE

## 2018-06-05 PROCEDURE — 94640 AIRWAY INHALATION TREATMENT: CPT

## 2018-06-05 PROCEDURE — 40000193 ZZH STATISTIC PT WARD VISIT: Performed by: PHYSICAL THERAPIST

## 2018-06-05 PROCEDURE — 94640 AIRWAY INHALATION TREATMENT: CPT | Mod: 76

## 2018-06-05 PROCEDURE — 97110 THERAPEUTIC EXERCISES: CPT | Mod: GP | Performed by: PHYSICAL THERAPIST

## 2018-06-05 PROCEDURE — 00000146 ZZHCL STATISTIC GLUCOSE BY METER IP

## 2018-06-05 RX ORDER — WARFARIN SODIUM 3 MG/1
3 TABLET ORAL ONCE
Status: COMPLETED | OUTPATIENT
Start: 2018-06-05 | End: 2018-06-05

## 2018-06-05 RX ORDER — WARFARIN SODIUM 3 MG/1
3 TABLET ORAL
Status: DISCONTINUED | OUTPATIENT
Start: 2018-06-05 | End: 2018-06-05

## 2018-06-05 RX ORDER — FUROSEMIDE 10 MG/ML
60 INJECTION INTRAMUSCULAR; INTRAVENOUS ONCE
Status: COMPLETED | OUTPATIENT
Start: 2018-06-05 | End: 2018-06-05

## 2018-06-05 RX ADMIN — TORSEMIDE 10 MG: 10 TABLET ORAL at 16:09

## 2018-06-05 RX ADMIN — INSULIN ASPART 1 UNITS: 100 INJECTION, SOLUTION INTRAVENOUS; SUBCUTANEOUS at 16:17

## 2018-06-05 RX ADMIN — CIPROFLOXACIN HYDROCHLORIDE 250 MG: 250 TABLET, FILM COATED ORAL at 08:54

## 2018-06-05 RX ADMIN — INSULIN ASPART 1 UNITS: 100 INJECTION, SOLUTION INTRAVENOUS; SUBCUTANEOUS at 09:11

## 2018-06-05 RX ADMIN — PREDNISOLONE ACETATE 1 DROP: 1.2 SUSPENSION/ DROPS OPHTHALMIC at 08:56

## 2018-06-05 RX ADMIN — IPRATROPIUM BROMIDE AND ALBUTEROL SULFATE 3 ML: .5; 3 SOLUTION RESPIRATORY (INHALATION) at 07:35

## 2018-06-05 RX ADMIN — CIPROFLOXACIN HYDROCHLORIDE 250 MG: 250 TABLET, FILM COATED ORAL at 20:27

## 2018-06-05 RX ADMIN — FUROSEMIDE 60 MG: 10 INJECTION, SOLUTION INTRAMUSCULAR; INTRAVENOUS at 10:54

## 2018-06-05 RX ADMIN — CHLORHEXIDINE GLUCONATE 15 ML: 1.2 RINSE ORAL at 20:34

## 2018-06-05 RX ADMIN — METOPROLOL TARTRATE 25 MG: 25 TABLET ORAL at 08:54

## 2018-06-05 RX ADMIN — WARFARIN SODIUM 3 MG: 3 TABLET ORAL at 11:16

## 2018-06-05 RX ADMIN — METOPROLOL TARTRATE 25 MG: 25 TABLET ORAL at 00:29

## 2018-06-05 RX ADMIN — TORSEMIDE 10 MG: 10 TABLET ORAL at 08:54

## 2018-06-05 RX ADMIN — INSULIN GLARGINE 20 UNITS: 100 INJECTION, SOLUTION SUBCUTANEOUS at 21:39

## 2018-06-05 RX ADMIN — IPRATROPIUM BROMIDE AND ALBUTEROL SULFATE 3 ML: .5; 3 SOLUTION RESPIRATORY (INHALATION) at 12:43

## 2018-06-05 RX ADMIN — INSULIN ASPART 1 UNITS: 100 INJECTION, SOLUTION INTRAVENOUS; SUBCUTANEOUS at 04:31

## 2018-06-05 RX ADMIN — METOPROLOL TARTRATE 25 MG: 25 TABLET ORAL at 16:09

## 2018-06-05 RX ADMIN — Medication 1 CAPSULE: at 08:54

## 2018-06-05 RX ADMIN — Medication 1 CAPSULE: at 20:27

## 2018-06-05 RX ADMIN — IPRATROPIUM BROMIDE AND ALBUTEROL SULFATE 3 ML: .5; 3 SOLUTION RESPIRATORY (INHALATION) at 19:37

## 2018-06-05 RX ADMIN — PREDNISOLONE ACETATE 1 DROP: 1.2 SUSPENSION/ DROPS OPHTHALMIC at 20:36

## 2018-06-05 RX ADMIN — CHLORHEXIDINE GLUCONATE 15 ML: 1.2 RINSE ORAL at 08:56

## 2018-06-05 RX ADMIN — MELATONIN 6 MG: 3 TAB ORAL at 20:27

## 2018-06-05 RX ADMIN — INSULIN ASPART 1 UNITS: 100 INJECTION, SOLUTION INTRAVENOUS; SUBCUTANEOUS at 11:24

## 2018-06-05 RX ADMIN — MIRTAZAPINE 30 MG: 7.5 TABLET ORAL at 21:38

## 2018-06-05 RX ADMIN — FLUTICASONE PROPIONATE 1 SPRAY: 50 SPRAY, METERED NASAL at 08:56

## 2018-06-05 RX ADMIN — INSULIN ASPART 1 UNITS: 100 INJECTION, SOLUTION INTRAVENOUS; SUBCUTANEOUS at 23:32

## 2018-06-05 RX ADMIN — RANITIDINE HYDROCHLORIDE 150 MG: 150 SOLUTION ORAL at 08:55

## 2018-06-05 NOTE — PROGRESS NOTES
Spoke with Kaya from [facility], Dr. Olivia Gannon is not able to accept patient due to patient panel is full.  They are waiting to hear from Dr. Grace to see if he can accept the patient.  Will continue to look for a physician who can manage the home ventilator. Daughter, Jen, is aware.

## 2018-06-05 NOTE — PROGRESS NOTES
Pt on full ventilatory support. P/S 10/5 30  Since 12:00 and open her eyes and follow commands. Normal vitals. Will assess and for weaning.6/5/2018  Shae Doll

## 2018-06-05 NOTE — PROGRESS NOTES
[facility] Nurse Inpatient Wound Assessment     Follow up Assessment of wound(s) on pt's:   Right lateral calf; left hand        Data:   Patient History:      per MD note(s): history A-fib, CHF, severe AS, DM II, asthma, chronic respiratory failure with vent dependence who was discharged home from [facility] on 18 but did not have appropriate home services arranged, but son also reporting a recent decline in respiratory status.   Marcio Assessment and sub scores:   Marcio Score  Av.2  Min: 11  Max: 16     Positioning: Pillows,     Mattress:  Standard , Atmos Air mattress       Current Diet / Nutrition:   TF - Dietitian following    Labs:   Recent Labs   Lab Test  18   0435   18   0425   18   0130   ALBUMIN   --    --   1.9*   < >   --    HGB  9.5*   < >  8.7*   < >   --    INR  1.53*   < >  1.61*   < >   --    WBC  14.8*   < >  11.2*   < >   --    A1C   --    --    --    --   6.0*    < > = values in this interval not displayed.         Wound Assessment (location):   Right, lateral, posterior calf  Wound History:  debridement    Wound Base: red, shiney , granulation    Specific Dimensions (length x width x depth, in cm) :   8.5 x5x 1.2cm    Tunneling:  N/A    Undermining: N/A    Palpation of the wound bed:  normal    Slough appearance:  none    Eschar appearance:  none    Periwound Skin: edema,      Color: normal and consistent with surrounding tissue    Temperature  normal     Drainage:  None noted today    Odor: none    Pain:  none              Intervention:     Patient's chart evaluated.      Assessed wounds as noted above    Discussed plan of care with Nurse and daughter             All patient / family questions answered- yes, discussed with daughter          Assessment:       Surgical debridement wound to right calf wound:  100% redder base, granulation noted wound margins, no drainage or local s/s infection noted        Plan:     Nursing to notify the Provider(s)  "and re-consult the WOC Nurse if wound(s) deteriorate(s) or if the wound care plan needs reevaluation.    Wound(s) right lateral calf (daily) and left hand  1. Clean wounds with MicroKlenz Spray, pat dry  2. Paint periwound tissue with No Sting Skin Prep (#[id number])) and allow to dry thoroughly  3. Apply small smear of Iodosorb Gel to Mepilex Sacral dressing in the size/ shape of wound on right lateral calf and press to wound.   4. May leave the wound on the back of the left hand open to the air.  If still having drainage cover with mepilex Border dressing every other day, odd days   5. Time and date dressing change and zeke with a \"T\" for treatment of a wound  6. Reposition pt every 1 to 2 hours when in bed and hourly when up to the chair to relieve pressure and promote perfusion to tissue.  Keep heels elevated at all times  NOTE:  Iodosorb Gel should not be used longer than 14 days. After 14 days the gel should be stopped and a new plan established, this may mean only a simple, gauze dressing.  The Iodosorb Gel should be stopped after use on June 14, 2018.       Above plan reviewed.    AVS completed for pending discharge      WOC Nurse will return: weekly and prn     "

## 2018-06-05 NOTE — PLAN OF CARE
Problem: Patient Care Overview  Goal: Plan of Care/Patient Progress Review  Outcome: No Change  Slept well most of the night. Easily rousable. Unable to assess orientation. Follows commands. Ventilator settings unchanged. Suctioned intermittently with thin white-yellow secretion. Lungs mostly clear. Vitals stable except for irregular heart rhythm. On continuous tube feeding. No BM. Normal urine output.

## 2018-06-05 NOTE — PLAN OF CARE
Problem: Patient Care Overview  Goal: Plan of Care/Patient Progress Review    SLP - Attempted to see patient this am for a speaking valve trial; however, RN reports patient has not been cooperative with PT/RN and recommends rescheduling session for 6/6.  Will continue SLP communication Tx 6/6 as able.

## 2018-06-05 NOTE — PLAN OF CARE
"Problem: Patient Care Overview  Goal: Plan of Care/Patient Progress Review  Discharge Planner PT   Patient plan for discharge: Per chart appears home with home care services  Current status: Pt resistant to attempts at ROM in B LEs, opens the L eye and mouths \"stop.\" Edu on benefits, opened the blind, elevated HOB, pt keeps eyes closed and allows minimal PROM at the ankles.   Barriers to return to prior living situation: Amount of assist needed for mobility however they do have a lift at home.                        Recommendations for discharge: Home if family able to provide assist needed including ablility  to use kerrie lift.   Rationale for recommendations:  Chart indicates family is making plans and getting supplies needed for home.        Entered by: Amanda Soliz 06/05/2018 8:26 AM           "

## 2018-06-05 NOTE — PROGRESS NOTES
[facility]  Comprehensive Daily ICU Note        Jacques Solis MRN# [id number]   Age: 88 year old YOB: 1930     Date of Admission: 5/21/2018    Primary care provider: Michael Mccarthy     CODE STATUS: Currently full      Problem List:   Dementia possibly with superimposed encephalopathy  Acute on chronic respiratory failure  Afib w complication of arterial embolism  Significant bleed secondary to Warfarin          Treatment goals for next 24 hours:   1. Continue alternating PS and CMV mode.  2. Continue discharge planning.  3. Ethics committee review of care plan established at [facility] for code status and dialysis.  If agree will institute here as well for future hospitalizations.       Blanca Vega MD        Subjective/ Last 24 hours:     Jacques Solis is a 88 year old female who presents with inadequate home support and possible acute on chronic hypoxic respiratory failure. She has essentially been hospitalized since September 2017 when she was admitted to [facility] for critical limb ischemia and underwent balloon angioplasty which was complicated by femoral artery perforation with hemorrhagic shock.  She developed chronic respiratory and was unable to wean from the vent and was trached and pegged on 11/13/17.  She was also noted to have some encephalopathy on a baseline of Alzheimer's dementia. Ultimately she was discharged to [place]. She has essentially moved back and forth from [place] and [facility] and then to [facility] and [facility] ever since. Her other problems include severe aortic stenosis - not a candidate for TAVR, chronic diastolic heart failure, chronic bilateral recurrent pleural effusions, chronic vent dependence, history of pulmonary embolism, and atrial fibrillation. Her most recent admission to [facility] 4/26-5/4 was due to acute blood loss anemia due to left thigh hematoma in addition to renal failure due to tobramycin toxicity for multi-drug resistant  "Pseudomonas.  She also underwent debridement of a right lower extremity wound due to an infected hematoma.  She was ultimately discharged to [facility], where she has stayed until discharge home on 5/18/18.  Pulmonology felt that she would not be able to wean and recommended discharge home or to TCU and son elected for discharge home.      Son reports he was not set up with anyone who would be able to manage a home vent, reports not being given detailed sliding scale insulin instructions (no dosage parameters), was unable to obtain nebs due to lack of appropriate ICD code on discharge orders, did not have supplies at home to replace clogged tube feeding piece in addition to concerns about the competency of the home nurse. Have reached out to [facility] to verify this information and are awaiting feedback.    Visited her PCP on 5/22 and requested admission to [facility].  Per outpatient doctor reason for request was not due to concern for inadequate medical care but rather related to [facility] care team recommending a more palliative approach of care.      Son reports his goals are for his mother to be able to wean from the vent for at least a few hours and be able to speak with valve and possible eat some food by mouth.  He understands that she may never be able to fully wean from the vent and is accepting of this, however, he wants any reversible conditions treated.    Review of chart from [facility] reveals that ethics consultation was requested and that goals of care conversations were held. Per notes, no dialysis was recommended and \"partial code\" was also recommended.  Currently listed as Full Code here.            Mechanical Ventilation/Vitalsigns/IsandOs:   Temp:  [98.1  F (36.7  C)-98.7  F (37.1  C)] 98.2  F (36.8  C)  Heart Rate:  [] 76  Resp:  [17-34] 23  BP: ()/(46-89) 130/48  FiO2 (%):  [30 %] 30 %  SpO2:  [92 %-100 %] 99 %      Intake/Output Summary (Last 24 hours) at 06/05/18 " 0626  Last data filed at 06/05/18 0400   Gross per 24 hour   Intake           1500.3 ml   Output             1495 ml   Net              5.3 ml   +7.9 liters    Ventilation Mode: CMV/AC  (Continuous Mandatory Ventilation/ Assist Control)  FiO2 (%): 30 %  Rate Set (breaths/minute): 8 breaths/min  Tidal Volume Set (mL): 360 mL  PEEP (cm H2O): 5 cmH2O  Pressure Support (cm H2O): 10 cmH2O  Oxygen Concentration (%): 30 %  Resp: 23    Day since: many many months         Physical Examination:     General: Stated age, NAD  HEENT: tracheostomy, atraumatic  Lungs: LCTAB anteriorly  CVS: RRR  Abdomen: +BS, soft and non tender  Extremities/musculoskeletal: warm, edematous  Neurology: awake and follows commands  Skin: normal  Psychiatry: calm  Exam of Line sites:  PICC looks OK            Feeding/Glucose:   Tube feeds    Blood glucose/Insulin requirement last 24 hours: sliding scale         Medications:       chlorhexidine  15 mL Swish & Spit BID     ciprofloxacin  250 mg Oral BID     fluticasone  1 spray Both Nostrils Daily     insulin aspart  1-6 Units Subcutaneous Q4H     insulin glargine  20 Units Subcutaneous At Bedtime     ipratropium - albuterol 0.5 mg/2.5 mg/3 mL  1 vial Nebulization TID     lactobacillus rhamnosus (GG)  1 capsule Oral or Feeding Tube BID     melatonin tablet 6 mg  6 mg Per G Tube Q24H     metoprolol tartrate (LOPRESSOR) tablet 25 mg  25 mg Oral or Feeding Tube Q8H     mirtazapine (REMERON) tablet TABS 30 mg  30 mg Oral or Feeding Tube At Bedtime     prednisoLONE acetate  1 drop Both Eyes BID     rantidine  150 mg Oral Daily     sodium chloride (PF)  10 mL Intracatheter Q7 Days     torsemide (DEMADEX) tablet 10 mg  10 mg Oral or Feeding Tube BID     valproic acid  125 mg Per Feeding Tube Q24H             Labs/Diagnostic studies:     EKG:  sinus    Echo:  No recent    ROUTINE ICU LABS (Last four results)  CMP  Recent Labs  Lab 06/05/18  0435 06/04/18  0400 06/03/18  0420 06/02/18 0425 05/30/18 2000  05/30/18  0257    138 138 136  < > 139 140   POTASSIUM 3.8 3.7 4.2 4.5  < > 4.9 4.4   CHLORIDE 98 97 98 98  < > 100 100   CO2 34* 34* 32 34*  < > 33* 32   ANIONGAP 6 7 8 4  < > 6 8   * 111* 148* 174*  < > 163* 206*   BUN 67* 73* 77* 88*  < > 70* 70*   CR 1.23* 1.39* 1.50* 1.75*  < > 1.29* 1.21*   GFRESTIMATED 41* 36* 33* 27*  < > 39* 42*   GFRESTBLACK 50* 43* 40* 33*  < > 47* 51*   KRISTOFER 8.5 8.4* 8.2* 8.3*  < > 8.4* 8.0*   PHOS  --   --  3.3  --   --   --   --    PROTTOTAL  --   --   --  5.4*  --  5.9* 5.6*   ALBUMIN  --   --   --  1.9*  --  2.2* 2.0*   BILITOTAL  --   --   --  0.7  --  0.5 0.4   ALKPHOS  --   --   --  172*  --  148 167*   AST  --   --   --  25  --  40 43   ALT  --   --   --  28  --  42 46   < > = values in this interval not displayed.  CBC  Recent Labs  Lab 06/05/18 0435 06/04/18 0400 06/03/18 0420 06/02/18  0425   WBC 14.8* 16.8* 11.9* 11.2*   RBC 3.24* 3.31* 3.16* 3.01*   HGB 9.5* 9.5* 9.2* 8.7*   HCT 29.7* 30.2* 28.4* 26.6*   MCV 92 91 90 88   MCH 29.3 28.7 29.1 28.9   MCHC 32.0 31.5 32.4 32.7   RDW 18.0* 17.5* 17.0* 16.9*    227 201 175     INR  Recent Labs  Lab 06/05/18 0435 06/04/18 0400 06/03/18 0420 06/02/18  0425   INR 1.53* 1.53* 1.52* 1.61*     Arterial Blood GasNo lab results found in last 7 days.    Cultures:    Recent Labs  Lab 06/01/18  2015 05/29/18  1000   CULT >100,000 colonies/mLKlebsiella oxytoca*  >100,000 colonies/mLEnterococcus faecalisAmpicillin and Penicillin susceptibilities in progress.6/4/18* 50,000 to 100,000 colonies/mLGram positive cocci*  10,000 to 50,000 colonies/mLGram negative rods*  <10,000 colonies/mLStrain 2Gram negative rods*  <10,000 colonies/mLStrain 2Gram positive cocci*  Susceptibility testing not routinely done     Blood culture:  Invalid input(s): BC   Urine culture:  No results for input(s): URC in the last 168 hours.            Imaging:     CXR:  On 6/3 kyphosis and bilateral lower lobe opacities.     CT:  No recent          Assessment and Plan:     Summary:  Jacques Solis is a 88 year old female with past history A-fib, CHF, severe AS, DM II, asthma, chronic respiratory failure with vent dependence who was discharged home from [facility] on 5/18/18 but returned to clinic on 5/22 and requested hospitalization.  Son also reporting a recent decline in respiratory status. .     Neurology and Psychiatry:  1. Delirium and Alzheimer's - with noted encephalopathy: Discharge summary states probable encephalopathy due to critical illness superimposed on Alzheimer's dementia per [facility] chart.  Baseline listed there is that she is able to answer questions about her health but is not oriented to place or time. Was on Nemenda as an outpatient.   - continue PTA Seroquel PRN with increased 12.5   - remeron 30 mg  - depakote qhs   - Melatonin 6mg QHS      Pulmonary:   1. Acute on chronic hypoxic respiratory failure with vent dependence:  Chronic failure felt due to pleural effusions and overall debility. Pulmonologist at [facility] felt she would chronically be vent dependent and weaning efforts were stopped.  CT chest in ED showed small to moderated loculated bilateral pleural effusions with atelectasis.  Also a 4 cm gil opacity in left upper lobe, but no SIRS criteria present.  Tolerating moderate PS of 12.   - Continue pressure support trials.   - Speaking valve  - Working to arrange discharge to home. Social work and care coordination involved  2. Reactive airways disease  - Continue scheduled bronchodilators.    Cardiovascular system:   1. Paroxysmal A-fib:   Intermittent rapid - which is evolving from NSR. Also related to lower extremity arterial clot.   - continue PTA metoprolol (note son has been dosing this at 25 mg q8h) - required intermittent IV dose 5/30  - have resumed anticoagulation at family request. Had a significant bleed on Warfarin so this is high risk but son requesting it be resumed.  2. Severe AS and chronic diastolic CHF: TTE 3/2018  with EF 55-60%, severe AS (area of 0.6-0.7) and mild regurgitation, moderate MR. Not a surgical or TAVR candidate due to advanced dementia  - continue torsemide increase back to home BID dosing 10 mg   - follow I/Os. Try net negative for a couple of days and then even.     Renal/Electrolytes:  1. Renal - Non oliguric PAULY - Cr rise times with acute bleed with hypotension:  Morris removed per family request but replaced 6/1 given ongoing skin breakdown and difficulty monitoring UOP.  Most recent culture with sensitive Klebsiella and Enterococcus, thus cipro started.   - follow Cr, UOP, maintain hemodynamics   - Cipro 5-7 day course       ID:  UTI as above.    GI//Nutrition:  Not safe to swallow. On chronic tube feeds. Some initial concern for ileus but tolerating tube feeds.     Musculoskeletal/Rheumatology:  Non healing wound. Doing wound cares.    Endocrine:   DM. Sugars controlled on long acting and sliding scale insulin.    Heme/Onc:  1. Recent acute blood loss anemia with thigh hematomas (Confirmed on CT 5/31):  INR mildly elevated at 4 but prior reportedly due to supratherapeutic lovenox in setting of PAULY.  Hemoglobin now stable and Warfarin restarted yesterday.   - Serial Hemoglobins and INRs.      ICU prophylaxis:      DVT: Warfarin     VAP: As above     Stress ulcer: Ranitidine     Restraints needed: no     Lines   Central Line/PICC - placed unknown needed: y   Arterial line - placed n needed: n   Morris catheter.  Needed: y       Prognosis:    Very very poor      Family update: will call son on phone    Billing: total time spend providing critical care was 40 min, excluding procedure time.    Blanca Vega MD  [phone]

## 2018-06-05 NOTE — PROGRESS NOTES
[facility] ICU RESPIRATORY NOTE  Date of Admission: 5/21/2018  Date of Intubation (most recent): vent dependent   Reason for Mechanical Ventilation:Acute on chronic resp.  vent dependency  Number of Days on Mechanical Ventilation:  Met Criteria for Pressure Support Trial: yes  Length of Pressure Support Trial: About 8hrs  Reason for Stopping Pressure Support Trial: resting for the night  Reason for No Pressure Support Trial:  Ventilation Mode: CMV/AC  (Continuous Mandatory Ventilation/ Assist Control)  FiO2 (%): 30 %  Rate Set (breaths/minute): 8 breaths/min  Tidal Volume Set (mL): 360 mL  PEEP (cm H2O): 5 cmH2O  Pressure Support (cm H2O): 10 cmH2O  Oxygen Concentration (%): 30 %  Resp: 25    Significant Events Today: PS 10/5 from 11:    ABG Results:  No lab results found in last 7 days.      Plan:  Pt remains full vent support for the night. RT will assess for PST in the morning.  6/5/2018  Keeley Virk

## 2018-06-05 NOTE — PROGRESS NOTES
Spoke with daughter, Jen, to set up care conference for Thursday afternoon.  Yarile, the son, is available after 6 pm during the week.  CC to follow up in am.  Jen also updated on waiting for a return call from [facility] if Dr. Grace will accept patient and able to manage the home ventilator.

## 2018-06-06 ENCOUNTER — APPOINTMENT (OUTPATIENT)
Dept: SPEECH THERAPY | Facility: CLINIC | Age: 83
DRG: 207 | End: 2018-06-06
Payer: MEDICARE

## 2018-06-06 ENCOUNTER — APPOINTMENT (OUTPATIENT)
Dept: PHYSICAL THERAPY | Facility: CLINIC | Age: 83
DRG: 207 | End: 2018-06-06
Payer: MEDICARE

## 2018-06-06 LAB
ANION GAP SERPL CALCULATED.3IONS-SCNC: 7 MMOL/L (ref 3–14)
BUN SERPL-MCNC: 64 MG/DL (ref 7–30)
CALCIUM SERPL-MCNC: 8.2 MG/DL (ref 8.5–10.1)
CHLORIDE SERPL-SCNC: 98 MMOL/L (ref 94–109)
CO2 SERPL-SCNC: 34 MMOL/L (ref 20–32)
CREAT SERPL-MCNC: 1.15 MG/DL (ref 0.52–1.04)
ERYTHROCYTE [DISTWIDTH] IN BLOOD BY AUTOMATED COUNT: 18.1 % (ref 10–15)
GFR SERPL CREATININE-BSD FRML MDRD: 45 ML/MIN/1.7M2
GLUCOSE BLDC GLUCOMTR-MCNC: 148 MG/DL (ref 70–99)
GLUCOSE BLDC GLUCOMTR-MCNC: 153 MG/DL (ref 70–99)
GLUCOSE BLDC GLUCOMTR-MCNC: 171 MG/DL (ref 70–99)
GLUCOSE BLDC GLUCOMTR-MCNC: 184 MG/DL (ref 70–99)
GLUCOSE SERPL-MCNC: 151 MG/DL (ref 70–99)
HCT VFR BLD AUTO: 28 % (ref 35–47)
HGB BLD-MCNC: 8.9 G/DL (ref 11.7–15.7)
INR PPP: 1.7 (ref 0.86–1.14)
MCH RBC QN AUTO: 29.2 PG (ref 26.5–33)
MCHC RBC AUTO-ENTMCNC: 31.8 G/DL (ref 31.5–36.5)
MCV RBC AUTO: 92 FL (ref 78–100)
PLATELET # BLD AUTO: 245 10E9/L (ref 150–450)
POTASSIUM SERPL-SCNC: 3.7 MMOL/L (ref 3.4–5.3)
RBC # BLD AUTO: 3.05 10E12/L (ref 3.8–5.2)
SODIUM SERPL-SCNC: 139 MMOL/L (ref 133–144)
WBC # BLD AUTO: 12 10E9/L (ref 4–11)

## 2018-06-06 PROCEDURE — 25000132 ZZH RX MED GY IP 250 OP 250 PS 637: Mod: GY | Performed by: HOSPITALIST

## 2018-06-06 PROCEDURE — 25000128 H RX IP 250 OP 636: Performed by: HOSPITALIST

## 2018-06-06 PROCEDURE — 94003 VENT MGMT INPAT SUBQ DAY: CPT

## 2018-06-06 PROCEDURE — 20000003 ZZH R&B ICU

## 2018-06-06 PROCEDURE — 40000225 ZZH STATISTIC SLP WARD VISIT: Performed by: SPEECH-LANGUAGE PATHOLOGIST

## 2018-06-06 PROCEDURE — 92526 ORAL FUNCTION THERAPY: CPT | Mod: GN | Performed by: SPEECH-LANGUAGE PATHOLOGIST

## 2018-06-06 PROCEDURE — 25000132 ZZH RX MED GY IP 250 OP 250 PS 637: Mod: GY | Performed by: NURSE PRACTITIONER

## 2018-06-06 PROCEDURE — 92507 TX SP LANG VOICE COMM INDIV: CPT | Mod: GN | Performed by: SPEECH-LANGUAGE PATHOLOGIST

## 2018-06-06 PROCEDURE — 94640 AIRWAY INHALATION TREATMENT: CPT

## 2018-06-06 PROCEDURE — 25000132 ZZH RX MED GY IP 250 OP 250 PS 637: Mod: GY

## 2018-06-06 PROCEDURE — 25000125 ZZHC RX 250: Performed by: INTERNAL MEDICINE

## 2018-06-06 PROCEDURE — 40000193 ZZH STATISTIC PT WARD VISIT: Performed by: PHYSICAL THERAPIST

## 2018-06-06 PROCEDURE — A9270 NON-COVERED ITEM OR SERVICE: HCPCS | Mod: GY | Performed by: HOSPITALIST

## 2018-06-06 PROCEDURE — 94640 AIRWAY INHALATION TREATMENT: CPT | Mod: 76

## 2018-06-06 PROCEDURE — A9270 NON-COVERED ITEM OR SERVICE: HCPCS | Mod: GY | Performed by: NURSE PRACTITIONER

## 2018-06-06 PROCEDURE — 85610 PROTHROMBIN TIME: CPT | Performed by: HOSPITALIST

## 2018-06-06 PROCEDURE — 00000146 ZZHCL STATISTIC GLUCOSE BY METER IP

## 2018-06-06 PROCEDURE — 27210432 ZZH NUTRITION PRODUCT RENAL BASIC LITER

## 2018-06-06 PROCEDURE — 25000132 ZZH RX MED GY IP 250 OP 250 PS 637: Mod: GY | Performed by: INTERNAL MEDICINE

## 2018-06-06 PROCEDURE — 97110 THERAPEUTIC EXERCISES: CPT | Mod: GP | Performed by: PHYSICAL THERAPIST

## 2018-06-06 PROCEDURE — 85027 COMPLETE CBC AUTOMATED: CPT | Performed by: HOSPITALIST

## 2018-06-06 PROCEDURE — 80048 BASIC METABOLIC PNL TOTAL CA: CPT | Performed by: HOSPITALIST

## 2018-06-06 PROCEDURE — 40000239 ZZH STATISTIC VAT ROUNDS

## 2018-06-06 PROCEDURE — 40000275 ZZH STATISTIC RCP TIME EA 10 MIN

## 2018-06-06 PROCEDURE — A9270 NON-COVERED ITEM OR SERVICE: HCPCS | Mod: GY | Performed by: INTERNAL MEDICINE

## 2018-06-06 PROCEDURE — A9270 NON-COVERED ITEM OR SERVICE: HCPCS | Mod: GY

## 2018-06-06 PROCEDURE — 99291 CRITICAL CARE FIRST HOUR: CPT | Performed by: INTERNAL MEDICINE

## 2018-06-06 RX ORDER — WARFARIN SODIUM 3 MG/1
3 TABLET ORAL
Status: COMPLETED | OUTPATIENT
Start: 2018-06-06 | End: 2018-06-06

## 2018-06-06 RX ADMIN — INSULIN ASPART 1 UNITS: 100 INJECTION, SOLUTION INTRAVENOUS; SUBCUTANEOUS at 05:03

## 2018-06-06 RX ADMIN — PREDNISOLONE ACETATE 1 DROP: 1.2 SUSPENSION/ DROPS OPHTHALMIC at 08:37

## 2018-06-06 RX ADMIN — TORSEMIDE 10 MG: 10 TABLET ORAL at 16:27

## 2018-06-06 RX ADMIN — CIPROFLOXACIN HYDROCHLORIDE 250 MG: 250 TABLET, FILM COATED ORAL at 08:36

## 2018-06-06 RX ADMIN — METOPROLOL TARTRATE 25 MG: 25 TABLET ORAL at 01:09

## 2018-06-06 RX ADMIN — Medication 1 CAPSULE: at 20:38

## 2018-06-06 RX ADMIN — INSULIN ASPART 1 UNITS: 100 INJECTION, SOLUTION INTRAVENOUS; SUBCUTANEOUS at 11:57

## 2018-06-06 RX ADMIN — INSULIN ASPART 1 UNITS: 100 INJECTION, SOLUTION INTRAVENOUS; SUBCUTANEOUS at 16:27

## 2018-06-06 RX ADMIN — TORSEMIDE 10 MG: 10 TABLET ORAL at 08:36

## 2018-06-06 RX ADMIN — METOPROLOL TARTRATE 25 MG: 25 TABLET ORAL at 08:37

## 2018-06-06 RX ADMIN — CIPROFLOXACIN HYDROCHLORIDE 250 MG: 250 TABLET, FILM COATED ORAL at 20:38

## 2018-06-06 RX ADMIN — RANITIDINE HYDROCHLORIDE 150 MG: 150 SOLUTION ORAL at 08:37

## 2018-06-06 RX ADMIN — MELATONIN 6 MG: 3 TAB ORAL at 20:38

## 2018-06-06 RX ADMIN — ACETAMINOPHEN 650 MG: 160 SUSPENSION ORAL at 15:45

## 2018-06-06 RX ADMIN — CHLORHEXIDINE GLUCONATE 15 ML: 1.2 RINSE ORAL at 08:23

## 2018-06-06 RX ADMIN — Medication 1 CAPSULE: at 08:36

## 2018-06-06 RX ADMIN — IPRATROPIUM BROMIDE AND ALBUTEROL SULFATE 3 ML: .5; 3 SOLUTION RESPIRATORY (INHALATION) at 07:14

## 2018-06-06 RX ADMIN — INSULIN ASPART 1 UNITS: 100 INJECTION, SOLUTION INTRAVENOUS; SUBCUTANEOUS at 08:21

## 2018-06-06 RX ADMIN — CHLORHEXIDINE GLUCONATE 15 ML: 1.2 RINSE ORAL at 20:38

## 2018-06-06 RX ADMIN — ACETAMINOPHEN 650 MG: 160 SUSPENSION ORAL at 01:47

## 2018-06-06 RX ADMIN — FLUTICASONE PROPIONATE 1 SPRAY: 50 SPRAY, METERED NASAL at 08:37

## 2018-06-06 RX ADMIN — WARFARIN SODIUM 3 MG: 3 TABLET ORAL at 18:05

## 2018-06-06 RX ADMIN — ONDANSETRON 4 MG: 2 INJECTION INTRAMUSCULAR; INTRAVENOUS at 21:38

## 2018-06-06 RX ADMIN — METOPROLOL TARTRATE 25 MG: 25 TABLET ORAL at 16:49

## 2018-06-06 RX ADMIN — IPRATROPIUM BROMIDE AND ALBUTEROL SULFATE 3 ML: .5; 3 SOLUTION RESPIRATORY (INHALATION) at 19:19

## 2018-06-06 RX ADMIN — INSULIN ASPART 1 UNITS: 100 INJECTION, SOLUTION INTRAVENOUS; SUBCUTANEOUS at 20:42

## 2018-06-06 RX ADMIN — Medication 12.5 MG: at 01:46

## 2018-06-06 NOTE — PLAN OF CARE
Problem: Patient Care Overview  Goal: Plan of Care/Patient Progress Review  Outcome: No Change  VSS.  Patient more sleepy this am and refused some cares/PT/OT pushing staff away.  Patient up to chair from 1300 to 1800.   WOC RN saw patient to assess and change RLE wound dressing. Social work and care coordinator involved in discharge plan.  Hoping to discharge home with nursing cares on Thursday.

## 2018-06-06 NOTE — PLAN OF CARE
Problem: Patient Care Overview  Goal: Plan of Care/Patient Progress Review  Discharge Planner SLP   Patient plan for discharge: Families plan is home with home care.   Current status:  Patient seen for Passy Anuel Valve trial with RT present for suctioning and ventilator settings. She was on CMV mode during the session and tolerated for 5 minutes with stable vitals. She was able to achieve voicing at the 2-3 word level. Confused during the session, she was not oriented to time or place. Her responses to basic questions were nonsensical. Session was short due to fatigue and no meanful communication. Daughter still wondering about swallowing. Education provided that she is not appropriate for swallowing oral PO intake at this time. Recommend: 1. Continue with Passy Anuel Valve trials as tolerated with ST and RT only at this time, on CMV or PS mode with close monitoring.  Barriers to return to prior living situation: Acuity of her illness.  Recommendations for discharge: Family wishes to take her home with home care.   Rationale for recommendations: Patient and family would benefit from [facility] for continued trials of the PMV for communication.        Entered by: Cely Junior 06/06/2018 2:23 PM

## 2018-06-06 NOTE — PROGRESS NOTES
CM    I:  SW will close consult for: Discharge planning.  SW has been in contact with RN CC who has been arranging Home Care/DME at time of d/c, which has been recommended by therapies.  RN CC had been working on arranging a family conference 6/7/18 at 1800.  JIMBO will confirm w/RN CC that all parties have been contacted.    P:  No SW interventions anticipated at this time. Will be available if needs arise.    CAMMY Ambrocio

## 2018-06-06 NOTE — PLAN OF CARE
Problem: Patient Care Overview  Goal: Plan of Care/Patient Progress Review  Outcome: No Change  No changes overnight. Difficult to assess orientation status as patient mouths words in response to questions, incoherent over ETT. Rarely follows commands. Tylenol and seroquel given for agitation/restlessness. Small amount of thick tan secretions. Cont wound care to RLE per POC.

## 2018-06-06 NOTE — PLAN OF CARE
Problem: Patient Care Overview  Goal: Plan of Care/Patient Progress Review  Discharge Planner PT   Patient plan for discharge: Per chart, home with home care services.   Current status: Pt sitting up in chair upon arrival of therapist, resistant yet agreeable to AAROM of B LEs and UEs. Noted pt resisted movement of B UEs.   Barriers to return to prior living situation: Level of assist-although pt has lift at home  Recommendations for discharge: Home if family is able to provide appropriate level of assist  Rationale for recommendations: Per chart, family is making plans and getting appropriate supplies needed for home       Entered by: Sintia Palomares 06/06/2018 4:19 PM

## 2018-06-06 NOTE — ETHICS CONSULT
ETHICS CONSULTATION Progress Note  [facility] ICU    Members of the Ethics Committee participating in this consult: Abeba Bruno, Larry Toro, Stacy Goyal, Dorothy Pedroza, Padmini Curry, Amanda Mckeon, Yariel Mcghee.  We have reviewed the pt s EMR including extensive notes from pt s hospitalizations at [facility]. Specifically, we cite that: the providers at [facility] were agreed and documented that pt was:  not a candidate for TAVR; not a dialysis candidate; [and] chest compressions in the setting of a cardiac arrest would be futile and only cause more harm to the patient okay for medications and interventions for shockable rhythms.   We also value the findings and recommendations from an Ethics Consultation held on May 2, 2018 at [facility] (which is based on pt s five hospitalizations there dating back to September 2017). We cite these portions of their documentation here:   Findings:  The medical team s consensus is that her condition is not improving nor is she progressing toward any curative goals.  The identified ethical issues are around the team s concern that they not inflict suffering or do harm to Jacques by providing any further aggressive or invasive cares should her condition decline.  It is ethically justifiable to withhold offering CPR.   Recommendations:  No escalation of cares or offering further interventions that are not medically indicated. Change her code status to no CPR or DNR.  Continue all supportive measures and continue palliative involvement, particularly for psycho-social support for family.  Encourage family to seek out rabbinical  regarding end-of-life decision-making.  Comfort Care focus would be an appropriate care plan which could allow her to get home sooner with support from hospice.    Additionally:  This patient has a Health Care Directive dated 10- in which pt states the following care preference for all three listed end-of-life situations:  try to extend my life as long as  possible, using all available interventions that in reasonable medical judgment would prevent or delay my death.   In this regard we appreciate the following statement from the [facility] Ethics consult note:  everything possible is not the same as everything that is beneficial.   The participants in this [facility] consultation, and in conversation with our medical team, supports the findings and recommendations listed above.   We expect, per pt s HCD, that family will ask for life prolonging medical care. We support the provision of life-prolonging care whenever it is medically indicated, and support withholding it when it is not. We also remain available to provide further ethical consultation, especially in the context of interventions the family may request that are not named above and for which there is not clear medical consensus.                                                                                                                                             Yariel Mcghee M.Div., [facility]  Lead  and Co-Chair of [facility] Biomedical Ethics Committee  Pager [phone]

## 2018-06-06 NOTE — TELEPHONE ENCOUNTER
Dr. Grace spoke to Andria with [facility] Care Coordination. Dr. Grace informed Andria that our clinic set up would not   Work for Pt.

## 2018-06-06 NOTE — PROGRESS NOTES
[facility] ICU RESPIRATORY NOTE  Date of Admission: 5/21/2018  Date of Intubation (most recent): vent dependent   Reason for Mechanical Ventilation:Acute on chronic resp.  vent dependency  Number of Days on Mechanical Ventilation:  Met Criteria for Pressure Support Trial: yes  Length of Pressure Support Trial: 10/5 6 hrs yest.   Reason for Stopping Pressure Support Trial: resting for the night  Reason for No Pressure Support Trial:     No lab results found in last 7 days.    Ventilation Mode: CMV/AC  (Continuous Mandatory Ventilation/ Assist Control)  FiO2 (%): 30 %  Rate Set (breaths/minute): 8 breaths/min  Tidal Volume Set (mL): 360 mL  PEEP (cm H2O): 5 cmH2O  Pressure Support (cm H2O): 10 cmH2O  Oxygen Concentration (%): 30 %  Resp: 16    Will continue to follow.     Jose Arellano RT

## 2018-06-06 NOTE — PROGRESS NOTES
[facility]  Comprehensive Daily ICU Note        Jacques Solis MRN# [id number]   Age: 88 year old YOB: 1930     Date of Admission: 5/21/2018    Primary care provider: Michael Mccarthy     CODE STATUS: Currently full.       Problem List:   Dementia possibly with superimposed encephalopathy  Acute on chronic respiratory failure, no significant chance of weaning  Afib w complication of arterial embolism  Significant bleed secondary to Warfarin   Anasarca         Treatment goals for next 24 hours:   1. Continue alternating PS and CMV mode.  2. Continue discharge planning.  3. Care conference tomorrow to discuss discharge plan and care plan      Blanca eVga MD        Subjective/ Last 24 hours:     Jacques Solis is a 88 year old female who presents with inadequate home support and possible acute on chronic hypoxic respiratory failure. She has essentially been hospitalized since September 2017 when she was admitted to [facility] for critical limb ischemia and underwent balloon angioplasty which was complicated by femoral artery perforation with hemorrhagic shock.  She developed chronic respiratory and was unable to wean from the vent and was trached and pegged on 11/13/17.  She was also noted to have some encephalopathy on a baseline of Alzheimer's dementia. She has essentially moved back and forth from [place] and [facility] and then to [facility] and [facility] ever since. Her other problems include severe aortic stenosis - not a candidate for TAVR, chronic diastolic heart failure, chronic bilateral recurrent pleural effusions, chronic vent dependence, history of pulmonary embolism, and atrial fibrillation. Her most recent admission to [facility] 4/26-5/4 was due to acute blood loss anemia due to left thigh hematoma in addition to renal failure due to tobramycin toxicity for multi-drug resistant Pseudomonas.  She also underwent debridement of a right lower extremity wound due to an infected hematoma.  She  "was ultimately discharged to [facility], where she stayed until discharge home on 5/18/18.  Pulmonology felt that she would not be able to wean and recommended discharge home or to TCU and son elected for discharge home.      Son reports he was not set up with anyone who would be able to manage a home vent, reports not being given detailed sliding scale insulin instructions (no dosage parameters), was unable to obtain nebs due to lack of appropriate ICD code on discharge orders, did not have supplies at home to replace clogged tube feeding piece in addition to concerns about the competency of the home nurse. Per [facility] most of these orders were actually in place: unclear where the breakdown in communication was.  Patient was not set up with a home health agency that was adequately able to manage her ventilator. Visited her [facility] based PCP on 5/22 and requested admission to [facility].      Son reports his goals are for his mother to be able to wean from the vent for at least a few hours and be able to speak with valve and possible eat some food by mouth.  He understands that she may never be able to fully wean from the vent and is accepting of this, however, he wants any reversible conditions treated. Both son and daughter say they were hoping a \"fresh set of eyes\" might come up with different answers for their mother but both agree that nothing much is different.    Review of chart from [facility] reveals that ethics consultation was requested and that goals of care conversations were held. Per notes, no dialysis was recommended and \"partial code\" was also recommended.  Currently listed as Full Code here.            Mechanical Ventilation/Vitalsigns/IsandOs:   Temp:  [98  F (36.7  C)-99.1  F (37.3  C)] 98.8  F (37.1  C)  Heart Rate:  [66-96] 79  Resp:  [9-32] 21  BP: ()/(45-99) 116/58  FiO2 (%):  [30 %] 30 %  SpO2:  [94 %-99 %] 96 %      Intake/Output Summary (Last 24 hours) at 06/06/18 4980  Last data " filed at 06/06/18 0600   Gross per 24 hour   Intake             1020 ml   Output              960 ml   Net               60 ml   +7.9 liters    Ventilation Mode: CMV/AC  (Continuous Mandatory Ventilation/ Assist Control)  FiO2 (%): 30 %  Rate Set (breaths/minute): 8 breaths/min  Tidal Volume Set (mL): 360 mL  PEEP (cm H2O): 5 cmH2O  Pressure Support (cm H2O): 10 cmH2O  Oxygen Concentration (%): 30 %  Resp: 21    Day since: many many months         Physical Examination:     General: Stated age, NAD  HEENT: tracheostomy, atraumatic  Lungs: LCTAB anteriorly  CVS: RRR  Abdomen: +BS, soft and non tender  Extremities/musculoskeletal: warm, edematous  Neurology: awake and follows commands  Skin: normal  Psychiatry: calm  Exam of Line sites:  PICC looks OK            Feeding/Glucose:   Tube feeds    Blood glucose/Insulin requirement last 24 hours: sliding scale         Medications:       chlorhexidine  15 mL Swish & Spit BID     ciprofloxacin  250 mg Oral BID     fluticasone  1 spray Both Nostrils Daily     insulin aspart  1-6 Units Subcutaneous Q4H     insulin glargine  20 Units Subcutaneous At Bedtime     ipratropium - albuterol 0.5 mg/2.5 mg/3 mL  1 vial Nebulization TID     lactobacillus rhamnosus (GG)  1 capsule Oral or Feeding Tube BID     melatonin tablet 6 mg  6 mg Per G Tube Q24H     metoprolol tartrate (LOPRESSOR) tablet 25 mg  25 mg Oral or Feeding Tube Q8H     mirtazapine (REMERON) tablet TABS 30 mg  30 mg Oral or Feeding Tube At Bedtime     prednisoLONE acetate  1 drop Both Eyes BID     rantidine  150 mg Oral Daily     sodium chloride (PF)  10 mL Intracatheter Q7 Days     torsemide (DEMADEX) tablet 10 mg  10 mg Oral or Feeding Tube BID     warfarin  3 mg Oral or Feeding Tube ONCE at 18:00        - MEDICATION INSTRUCTIONS -       Warfarin Therapy Reminder                Labs/Diagnostic studies:     EKG:  sinus    Echo:  No recent    ROUTINE ICU LABS (Last four results)  CMP  Recent Labs  Lab 06/06/18  0400  06/05/18 0435 06/04/18 0400 06/03/18 0420 06/02/18 0425 05/30/18 2000    138 138 138 136  < > 139   POTASSIUM 3.7 3.8 3.7 4.2 4.5  < > 4.9   CHLORIDE 98 98 97 98 98  < > 100   CO2 34* 34* 34* 32 34*  < > 33*   ANIONGAP 7 6 7 8 4  < > 6   * 150* 111* 148* 174*  < > 163*   BUN 64* 67* 73* 77* 88*  < > 70*   CR 1.15* 1.23* 1.39* 1.50* 1.75*  < > 1.29*   GFRESTIMATED 45* 41* 36* 33* 27*  < > 39*   GFRESTBLACK 54* 50* 43* 40* 33*  < > 47*   KRISTOFER 8.2* 8.5 8.4* 8.2* 8.3*  < > 8.4*   PHOS  --   --   --  3.3  --   --   --    PROTTOTAL  --   --   --   --  5.4*  --  5.9*   ALBUMIN  --   --   --   --  1.9*  --  2.2*   BILITOTAL  --   --   --   --  0.7  --  0.5   ALKPHOS  --   --   --   --  172*  --  148   AST  --   --   --   --  25  --  40   ALT  --   --   --   --  28  --  42   < > = values in this interval not displayed.  CBC    Recent Labs  Lab 06/06/18 0400 06/05/18 0435 06/04/18 0400 06/03/18 0420   WBC 12.0* 14.8* 16.8* 11.9*   RBC 3.05* 3.24* 3.31* 3.16*   HGB 8.9* 9.5* 9.5* 9.2*   HCT 28.0* 29.7* 30.2* 28.4*   MCV 92 92 91 90   MCH 29.2 29.3 28.7 29.1   MCHC 31.8 32.0 31.5 32.4   RDW 18.1* 18.0* 17.5* 17.0*    248 227 201     INR    Recent Labs  Lab 06/06/18 0400 06/05/18  0435 06/04/18  0400 06/03/18  0420   INR 1.70* 1.53* 1.53* 1.52*     Arterial Blood GasNo lab results found in last 7 days.    Cultures:    Recent Labs  Lab 06/01/18 2015   CULT >100,000 colonies/mLKlebsiella oxytoca*  >100,000 colonies/mLEnterococcus faecalis*     Blood culture:  Invalid input(s): BC   Urine culture:  No results for input(s): URC in the last 168 hours.            Imaging:     CXR:  On 6/3 kyphosis and bilateral lower lobe opacities.     CT:  No recent         Assessment and Plan:     Summary:  Jacques Solis is a 88 year old female with past history A-fib, CHF, severe AS, DM II, asthma, chronic respiratory failure with vent dependence who was discharged home from [facility] on 5/18/18 but returned to clinic  on 5/22 and requested hospitalization because of inability to manage respiratory status at home.    Neurology and Psychiatry:  1. Delirium and Alzheimer's - with noted encephalopathy: Discharge summary states probable encephalopathy due to critical illness superimposed on Alzheimer's dementia per [facility] chart.  Baseline listed there is that she is able to answer questions about her health but is not oriented to place or time. Was on Nemenda as an outpatient. Valproic acid started this hospitalization for agitation. Son feels it made her too sleepy.  - continue PTA Seroquel PRN with increased 12.5   - remeron 30 mg  - Trial off Valproic acid and follow  - Melatonin 6mg QHS      Pulmonary:   1. Acute on chronic hypoxic respiratory failure with vent dependence:  Chronic failure felt due to pleural effusions and overall debility. Pulmonologist at [facility] felt she would chronically be vent dependent and weaning efforts were stopped.  CT chest in ED showed small to moderated loculated bilateral pleural effusions with atelectasis.  Also a 4 cm gil opacity in left upper lobe, but no SIRS criteria present.  Tolerating moderate PS of 12.   - Continue pressure support trials.   - Speaking valve  - Working to arrange discharge to home. Social work and care coordination involved.  Need to find a home health agency that will manage ventilator. Patient may be able to find her own PCP separately from this.   2. Reactive airways disease  - Continue scheduled bronchodilators.    Cardiovascular system:   1. Paroxysmal A-fib:   Intermittent rapid - which is evolving from NSR. Also related to lower extremity arterial clot.   - continue PTA metoprolol (note son has been dosing this at 25 mg q8h) - required intermittent IV dose 5/30  - have resumed anticoagulation at family request. Had a significant bleed on Warfarin so this is high risk but son requesting it be resumed.  2. Severe AS and chronic diastolic CHF: TTE 3/2018 with EF  55-60%, severe AS (area of 0.6-0.7) and mild regurgitation, moderate MR. Not a surgical or TAVR candidate due to advanced dementia  - continue torsemide increase back to home BID dosing 10 mg   - follow I/Os. Try net negative for a couple of days and then even.       Renal/Electrolytes:  1. Renal - Non oliguric PAULY - Cr rise times with acute bleed with hypotension:  Morris removed per family request but replaced 6/1 given ongoing skin breakdown and difficulty monitoring UOP.  Most recent culture with sensitive Klebsiella and Enterococcus, thus cipro started. Creatinine coming back down.  - follow Cr, UOP, maintain hemodynamics   - Cipro 5-7 day course       ID:  UTI as above.    GI//Nutrition:  Not safe to swallow. On chronic tube feeds. Some initial concern for ileus but tolerating tube feeds. Family this morning requesting swallow eval  - Have asked speech pathology to speak with them.    Musculoskeletal/Rheumatology:  Non healing wound. Doing wound cares.    Endocrine:   DM. Sugars controlled on long acting and sliding scale insulin.    Heme/Onc:  1. Recent acute blood loss anemia with thigh hematomas (Confirmed on CT 5/31):  INR mildly elevated at 4 but prior reportedly due to supratherapeutic lovenox in setting of PAULY.  Hemoglobin now stable and Warfarin restarted. INR rising.   - Serial Hemoglobins and INRs.      ICU prophylaxis:      DVT: Warfarin     VAP: As above     Stress ulcer: Ranitidine     Restraints needed: no     Lines   Central Line/PICC - placed unknown needed: y   Arterial line - placed n needed: n   Morris catheter.  Needed: y       Prognosis:    Very very poor      Family update: son on phone and daughter bedside.    Billing: total time spend providing critical care was 40 min, excluding procedure time.    Blanca Vega MD  [phone]

## 2018-06-07 ENCOUNTER — APPOINTMENT (OUTPATIENT)
Dept: GENERAL RADIOLOGY | Facility: CLINIC | Age: 83
DRG: 207 | End: 2018-06-07
Attending: INTERNAL MEDICINE
Payer: MEDICARE

## 2018-06-07 ENCOUNTER — APPOINTMENT (OUTPATIENT)
Dept: SPEECH THERAPY | Facility: CLINIC | Age: 83
DRG: 207 | End: 2018-06-07
Payer: MEDICARE

## 2018-06-07 LAB
ANION GAP SERPL CALCULATED.3IONS-SCNC: 7 MMOL/L (ref 3–14)
BUN SERPL-MCNC: 63 MG/DL (ref 7–30)
CALCIUM SERPL-MCNC: 8.1 MG/DL (ref 8.5–10.1)
CHLORIDE SERPL-SCNC: 96 MMOL/L (ref 94–109)
CO2 SERPL-SCNC: 36 MMOL/L (ref 20–32)
CREAT SERPL-MCNC: 1.2 MG/DL (ref 0.52–1.04)
ERYTHROCYTE [DISTWIDTH] IN BLOOD BY AUTOMATED COUNT: 18 % (ref 10–15)
GFR SERPL CREATININE-BSD FRML MDRD: 42 ML/MIN/1.7M2
GLUCOSE BLDC GLUCOMTR-MCNC: 131 MG/DL (ref 70–99)
GLUCOSE BLDC GLUCOMTR-MCNC: 70 MG/DL (ref 70–99)
GLUCOSE BLDC GLUCOMTR-MCNC: 90 MG/DL (ref 70–99)
GLUCOSE BLDC GLUCOMTR-MCNC: 96 MG/DL (ref 70–99)
GLUCOSE BLDC GLUCOMTR-MCNC: 97 MG/DL (ref 70–99)
GLUCOSE SERPL-MCNC: 128 MG/DL (ref 70–99)
HCT VFR BLD AUTO: 29.4 % (ref 35–47)
HGB BLD-MCNC: 9.1 G/DL (ref 11.7–15.7)
INR PPP: 1.56 (ref 0.86–1.14)
MCH RBC QN AUTO: 28.5 PG (ref 26.5–33)
MCHC RBC AUTO-ENTMCNC: 31 G/DL (ref 31.5–36.5)
MCV RBC AUTO: 92 FL (ref 78–100)
PLATELET # BLD AUTO: 278 10E9/L (ref 150–450)
POTASSIUM SERPL-SCNC: 3.5 MMOL/L (ref 3.4–5.3)
RBC # BLD AUTO: 3.19 10E12/L (ref 3.8–5.2)
SODIUM SERPL-SCNC: 139 MMOL/L (ref 133–144)
WBC # BLD AUTO: 10.6 10E9/L (ref 4–11)

## 2018-06-07 PROCEDURE — 99292 CRITICAL CARE ADDL 30 MIN: CPT | Performed by: INTERNAL MEDICINE

## 2018-06-07 PROCEDURE — 00000146 ZZHCL STATISTIC GLUCOSE BY METER IP

## 2018-06-07 PROCEDURE — 40000239 ZZH STATISTIC VAT ROUNDS

## 2018-06-07 PROCEDURE — 40000008 ZZH STATISTIC AIRWAY CARE

## 2018-06-07 PROCEDURE — 92507 TX SP LANG VOICE COMM INDIV: CPT | Mod: GN | Performed by: SPEECH-LANGUAGE PATHOLOGIST

## 2018-06-07 PROCEDURE — 94640 AIRWAY INHALATION TREATMENT: CPT | Mod: 76

## 2018-06-07 PROCEDURE — A9270 NON-COVERED ITEM OR SERVICE: HCPCS | Mod: GY | Performed by: NURSE PRACTITIONER

## 2018-06-07 PROCEDURE — 25000131 ZZH RX MED GY IP 250 OP 636 PS 637: Mod: GY | Performed by: NURSE PRACTITIONER

## 2018-06-07 PROCEDURE — 94640 AIRWAY INHALATION TREATMENT: CPT

## 2018-06-07 PROCEDURE — A9270 NON-COVERED ITEM OR SERVICE: HCPCS | Mod: GY | Performed by: HOSPITALIST

## 2018-06-07 PROCEDURE — 25000132 ZZH RX MED GY IP 250 OP 250 PS 637: Mod: GY | Performed by: NURSE PRACTITIONER

## 2018-06-07 PROCEDURE — 25000132 ZZH RX MED GY IP 250 OP 250 PS 637: Mod: GY | Performed by: HOSPITALIST

## 2018-06-07 PROCEDURE — A9270 NON-COVERED ITEM OR SERVICE: HCPCS | Mod: GY | Performed by: INTERNAL MEDICINE

## 2018-06-07 PROCEDURE — 85610 PROTHROMBIN TIME: CPT | Performed by: HOSPITALIST

## 2018-06-07 PROCEDURE — 85027 COMPLETE CBC AUTOMATED: CPT | Performed by: HOSPITALIST

## 2018-06-07 PROCEDURE — 71045 X-RAY EXAM CHEST 1 VIEW: CPT

## 2018-06-07 PROCEDURE — 94003 VENT MGMT INPAT SUBQ DAY: CPT

## 2018-06-07 PROCEDURE — 20000003 ZZH R&B ICU

## 2018-06-07 PROCEDURE — 99291 CRITICAL CARE FIRST HOUR: CPT | Performed by: INTERNAL MEDICINE

## 2018-06-07 PROCEDURE — 80048 BASIC METABOLIC PNL TOTAL CA: CPT | Performed by: HOSPITALIST

## 2018-06-07 PROCEDURE — 25000132 ZZH RX MED GY IP 250 OP 250 PS 637: Mod: GY | Performed by: INTERNAL MEDICINE

## 2018-06-07 PROCEDURE — 40000275 ZZH STATISTIC RCP TIME EA 10 MIN

## 2018-06-07 PROCEDURE — 25000125 ZZHC RX 250: Performed by: INTERNAL MEDICINE

## 2018-06-07 PROCEDURE — 40000225 ZZH STATISTIC SLP WARD VISIT: Performed by: SPEECH-LANGUAGE PATHOLOGIST

## 2018-06-07 PROCEDURE — 74018 RADEX ABDOMEN 1 VIEW: CPT

## 2018-06-07 RX ORDER — WARFARIN SODIUM 3 MG/1
3 TABLET ORAL
Status: COMPLETED | OUTPATIENT
Start: 2018-06-07 | End: 2018-06-07

## 2018-06-07 RX ORDER — CIPROFLOXACIN 250 MG/1
250 TABLET, FILM COATED ORAL 2 TIMES DAILY
Status: COMPLETED | OUTPATIENT
Start: 2018-06-07 | End: 2018-06-09

## 2018-06-07 RX ADMIN — TORSEMIDE 10 MG: 10 TABLET ORAL at 09:20

## 2018-06-07 RX ADMIN — IPRATROPIUM BROMIDE AND ALBUTEROL SULFATE 3 ML: .5; 3 SOLUTION RESPIRATORY (INHALATION) at 13:22

## 2018-06-07 RX ADMIN — MIRTAZAPINE 30 MG: 7.5 TABLET ORAL at 21:35

## 2018-06-07 RX ADMIN — Medication 1 CAPSULE: at 09:20

## 2018-06-07 RX ADMIN — PREDNISOLONE ACETATE 1 DROP: 1.2 SUSPENSION/ DROPS OPHTHALMIC at 00:08

## 2018-06-07 RX ADMIN — CIPROFLOXACIN HYDROCHLORIDE 250 MG: 250 TABLET, FILM COATED ORAL at 09:20

## 2018-06-07 RX ADMIN — PREDNISOLONE ACETATE 1 DROP: 1.2 SUSPENSION/ DROPS OPHTHALMIC at 21:36

## 2018-06-07 RX ADMIN — RANITIDINE HYDROCHLORIDE 150 MG: 150 SOLUTION ORAL at 09:20

## 2018-06-07 RX ADMIN — INSULIN GLARGINE 20 UNITS: 100 INJECTION, SOLUTION SUBCUTANEOUS at 00:08

## 2018-06-07 RX ADMIN — CHLORHEXIDINE GLUCONATE 15 ML: 1.2 RINSE ORAL at 21:34

## 2018-06-07 RX ADMIN — IPRATROPIUM BROMIDE AND ALBUTEROL SULFATE 3 ML: .5; 3 SOLUTION RESPIRATORY (INHALATION) at 07:11

## 2018-06-07 RX ADMIN — IPRATROPIUM BROMIDE AND ALBUTEROL SULFATE 3 ML: .5; 3 SOLUTION RESPIRATORY (INHALATION) at 18:18

## 2018-06-07 RX ADMIN — WARFARIN SODIUM 3 MG: 3 TABLET ORAL at 18:48

## 2018-06-07 RX ADMIN — CIPROFLOXACIN HYDROCHLORIDE 250 MG: 250 TABLET, FILM COATED ORAL at 22:06

## 2018-06-07 RX ADMIN — Medication 1 CAPSULE: at 21:35

## 2018-06-07 RX ADMIN — MIRTAZAPINE 30 MG: 7.5 TABLET ORAL at 00:07

## 2018-06-07 RX ADMIN — MELATONIN 6 MG: 3 TAB ORAL at 21:34

## 2018-06-07 RX ADMIN — METOPROLOL TARTRATE 25 MG: 25 TABLET ORAL at 03:02

## 2018-06-07 RX ADMIN — METOPROLOL TARTRATE 25 MG: 25 TABLET ORAL at 09:21

## 2018-06-07 RX ADMIN — TORSEMIDE 10 MG: 10 TABLET ORAL at 16:49

## 2018-06-07 RX ADMIN — FLUTICASONE PROPIONATE 1 SPRAY: 50 SPRAY, METERED NASAL at 09:31

## 2018-06-07 RX ADMIN — CHLORHEXIDINE GLUCONATE 15 ML: 1.2 RINSE ORAL at 09:22

## 2018-06-07 RX ADMIN — PREDNISOLONE ACETATE 1 DROP: 1.2 SUSPENSION/ DROPS OPHTHALMIC at 09:31

## 2018-06-07 NOTE — PROGRESS NOTES
Palliative consult received. Spoke with Dr. Fairchild, no identifiable palliative needs at this time. Will remain available if needs arise. Thanks.    Tiffanie REINA, [facility]  Palliative Medicine   Pager: [phone]

## 2018-06-07 NOTE — PROGRESS NOTES
Pt. Remains on trach  Alert and intermittently follows commands.   Trach CMV 40%  Peep 5. Rate 8  Patient vomited several times overnight. Tele ICU notified. Bowel sounds hyperactive. 50 ml residual drawn.   Orders for xray of abdomen and chest, tube feed off, NPO, and PEG tube to low intermittent suction.  100 ml output after 5 hours.   Afebrile.  Sinus arrythmia  BP WNL  Wound care done.  Morris in place. Urine output >50/HR.  Report given to oncoming nurse. WIll continue to monitor.

## 2018-06-07 NOTE — PLAN OF CARE
Problem: Patient Care Overview  Goal: Plan of Care/Patient Progress Review  Outcome: Improving  Pt. Remains on trach, settings unchanged, suctioning Q4H for small amounts, remains baseline alzheimers dimentia with some following, pt's daughter at bedside, tylenol given X1, pt. With mild axillary temp., after activity to chair, room temp. Noted to be warm and cooled with thermostat, temp. Improving, pt's daughter at bedside and was updated of plan of care and hopes to get pt. Home soon.

## 2018-06-07 NOTE — PROGRESS NOTES
[facility] ICU RESPIRATORY NOTE  Date of Admission: 5/21/2018  Date of Intubation (most recent):Vent dependent  Reason for Mechanical Ventilation:Acute on chronic resp. failure  Number of Days on Mechanical Ventilation:vent dependent  Met Criteria for Pressure Support Trial:Yes  Length of Pressure Support Trial:10/5 for 3hrs, decreased to 8/5 for 2.5 hrs, and the placed back on PS 10/5 at 1330.  Reason for Stopping Pressure Support Trial: SpO2 dropped per nursing when pt was being repositioned. Pt was placed back on CMV settings for 1hr to rest.     Ventilation Mode: CPAP/PS  (Continuous positive airway pressure with Pressure Support)  FiO2 (%): 30 %  Rate Set (breaths/minute): 8 breaths/min  Tidal Volume Set (mL): 360 mL  PEEP (cm H2O): 5 cmH2O  Pressure Support (cm H2O): 10 cmH2O  Oxygen Concentration (%): 30 %  Resp: 18      Significant Events Today:PMV done for 25 minutes on PS 10/5.     ABG Results:No new resutls    Plan:  Plan to place pt back on CMV settings this evening to rest overnight. Will then assess for another PS trial in the morning.  6/7/2018  Cely Valentin RRT

## 2018-06-07 NOTE — PROGRESS NOTES
CLINICAL NUTRITION SERVICES - REASSESSMENT NOTE      Recommendations Ordered by Registered Dietitian (RD): Resume TF today per MD request -- Nepro at 10 mL/hr (x 20 hours) to provide:  360 kcal (6 kcal/kg), 16 g protein (0.3 g/kg), 32 g CHO, 3 g fiber, 146 mL H2O   Future/Additional Recommendations: If above tolerated, recommend increase TF towards eventual goal of Nepro at 40 mL/hr x 20 hours to provide:  1440 kcal (23 kcal/kg), 65 g protein (1 g/kg), 129 g CHO, 10 g fiber, 584 mL H2O    Malnutrition:   (5/22)  % Weight Loss:  None noted  % Intake:  No decreased intake noted  Subcutaneous Fat Loss:  None observed  Muscle Loss:  None observed  Fluid Retention:  None noted      Malnutrition Diagnosis: Patient does not meet two of the above criteria necessary for diagnosing malnutrition       EVALUATION OF PROGRESS TOWARD GOALS   Diet:  NPO  Nutrition Support:  Patient has been receiving goal TF of Nepro at 35 mL/hr, however it was turned off last night due to emesis.  Goal TF per EPIC is as follows ~    Nutrition Support Enteral:  Type of Feeding Tube: G-tube   Enteral Frequency:  Continuous  Enteral Regimen: Nepro at 35 mL/hr x 20 hours per day (TF held 1 hour before and 1 hour after Cipro administration)  Total Enteral Provisions: 1260 kcal (20 kcal/kg), 57 g protein (0.9 g/kg), 113 g CHO, 9 g fiber, 511 mL H2O  Free Water Flush: 70 mL every 4 hours     Intake/Tolerance:    TF off overnight due to emesis as described as above.  Per RN, now hooked up to LIS - 100 mL out.  BM x 1 yesterday.  Abdominal x-ray showed air in bowel.  OK to resume TF at 10 mL/hr per Dr. Vega.  , 171, 148, 153, 128, 131 - Med SSI + Lantus 20 units at HS.  BUN 63 (H), Cr 1.2 (H) - CKD       ASSESSED NUTRITION NEEDS PER APPROVED PRACTICE GUIDELINES:      Dosing Weight 61.8 kg   Estimated Energy Needs: 3460-7171 kcals (20-25 Kcal/Kg)  Justification: obese and vented  Estimated Protein Needs: 50-75 grams protein (0.8-1.2 g  pro/Kg)  Justification: CKD      NEW FINDINGS:   Cipro down FT BID as above (hold TF 1 hour before and 1 hour after administration)  Receiving Culturell for general bowel health   I/O 420/1305, weight 87.9 kg - On Demadex     6/5:  WOCN = Surgical debridement to right calf wound:  100% redder base, granulation noted wound margins, no drainage or local s/s infection noted     Plan for care conference tonight at 6:00 pm     Previous Goals (6/4):   TF will meet % of estimated needs  Evaluation: Not met - TF off     Previous Nutrition Diagnosis (6/4):   No nutrition diagnosis identified at this time  Evaluation: Declining      CURRENT NUTRITION DIAGNOSIS  Inadequate enteral nutrition infusion related to TF off, plans to resume at 10 mL/hr as evidenced by meeting 0% needs     INTERVENTIONS  Recommendations / Nutrition Prescription  Resume TF today per MD request -- Nepro at 10 mL/hr (x 20 hours) to provide:  360 kcal (6 kcal/kg), 16 g protein (0.3 g/kg), 32 g CHO, 3 g fiber, 146 mL H2O     If above tolerated, recommend increase TF towards eventual goal of Nepro at 40 mL/hr x 20 hours to provide:  1440 kcal (23 kcal/kg), 65 g protein (1 g/kg), 129 g CHO, 10 g fiber, 584 mL H2O     Implementation  EN Composition:  TF orders modified as above   Collaboration and Referral of Nutrition care:  Patient discussed today during interdisciplinary bedside rounds     Goals  TF will advance towards eventual goal within the next 48 hours     MONITORING AND EVALUATION:  Progress towards goals will be monitored and evaluated per protocol and Practice Guidelines    Alma Arcos, RD, LD, CNSC   Clinical Dietitian - [facility]

## 2018-06-07 NOTE — PLAN OF CARE
Problem: Patient Care Overview  Goal: Plan of Care/Patient Progress Review  Discharge Planner SLP   Patient plan for discharge: Patient is not able to state.   Current status: Patient was seen for the Passy Anuel Valve (PMV) speaking valve. Patient was on PS with RT present for suctioning and ventilator settings. She was able to achieve good voicing at the sentence level during conversation. She continues to be confused and unable to maintain topic of conversation. She was not able to state her daughters name at bedside, not orientated to time or place. She was able to follow some basic commands with max verbal cues and visual demonstration. She tolerated the PMV for total of 25 minutes with stable vitals and oxygen saturations above 92%. Recommend: 1. Continue PMV trials with SLP and RT only at this time.    Barriers to return to prior living situation: Acuity of her illness.   Recommendations for discharge: Family wants to bring her home with 24/7 care and home care.   Rationale for recommendations: Continue ST at discharge through home care for continued use of the PMV and eventually swallowing as tolerated.        Entered by: Cely Junior 06/07/2018 2:00 PM

## 2018-06-07 NOTE — PROGRESS NOTES
"Care Coordination:    Per Rounding and multiple phone calls from family members of patient, writer was asked to check on INR meter, Air mattress, and MD to manage Vent at home or a vented patient. Called [facility] and talked with Catalina who quoted $3630.90 for cost of Air mattress that family wanted, gave quote to DAughter, Jen. Checked in with Tiff[facility] care and Hiral for question of \"who manages the home vent, Is it the primary MD?\" Seferrt DON stated\"We do, not a PCP, We are in the home everyday and are trained to work with them.\" They are unable to do give care this weekend.\"   Hiral stated,\" Usually people are discharged with a PCP, they are not the one's who manage or are called to manage the vent or vented patient. Usually it is the [facility] agency, . \"  Placed call to [facility], they have a 24 hour RT on call at all times, they have assigned RT Leach Cindy is the customer  Service repMaggie TRUJILLO is also willing to come to the patient's home to provide SNV's, they would also be a resource and management of home care.  Called [facility] for INR machine,  Per [facility], they never received a request for machine. Writer told [facility] got a fax to fill out and fax back on 5/31. Refaxed request to [facility] for INR home machine.    Judie Lloyd RN BSN  [facility] Care Coordinator  Phone [phone]    "

## 2018-06-07 NOTE — PROGRESS NOTES
[facility]  Comprehensive Daily ICU Note        Jacques Solis MRN# [id number]   Age: 88 year old YOB: 1930     Date of Admission: 5/21/2018    Primary care provider: Michael Mccarthy     CODE STATUS: Currently full.       Problem List:   Dementia possibly with superimposed encephalopathy  Acute on chronic respiratory failure, no significant chance of weaning  Afib w complication of arterial embolism  Significant bleed secondary to Warfarin   Anasarca  Ileus         Treatment goals for next 24 hours:   1. Continue alternating PS and CMV mode.  2. Continue discharge planning. Hope to send on Monday.  3. Care conference to discuss care plan and discharge plan.  4. Tube feeds as able      Blanca Vega MD        Subjective/ Last 24 hours:     Jacques Solis is a 88 year old female who presents with inadequate home support and possible acute on chronic hypoxic respiratory failure. She has essentially been hospitalized since September 2017 when she was admitted to [facility] for critical limb ischemia and underwent balloon angioplasty which was complicated by femoral artery perforation with hemorrhagic shock.  She developed chronic respiratory and was unable to wean from the vent and was trached and pegged on 11/13/17.  She was also noted to have some encephalopathy on a baseline of Alzheimer's dementia. She has essentially moved back and forth from [place] and [facility] and then to [facility] and [facility] ever since. Her other problems include severe aortic stenosis - not a candidate for TAVR, chronic diastolic heart failure, chronic bilateral recurrent pleural effusions, chronic vent dependence, history of pulmonary embolism, and atrial fibrillation. Her most recent admission to [facility] 4/26-5/4 was due to acute blood loss anemia due to left thigh hematoma in addition to renal failure due to tobramycin toxicity for multi-drug resistant Pseudomonas.  She also underwent debridement of a right lower  "extremity wound due to an infected hematoma.  She was ultimately discharged to [facility], where she stayed until discharge home on 5/18/18.  Pulmonology felt that she would not be able to wean and recommended discharge home or to TCU and son elected for discharge home.      Son reports he was not set up with anyone who would be able to manage a home vent, reports not being given detailed sliding scale insulin instructions (no dosage parameters), was unable to obtain nebs due to lack of appropriate ICD code on discharge orders, did not have supplies at home to replace clogged tube feeding piece in addition to concerns about the competency of the home nurse. Per [facility] most of these orders were actually in place: unclear where the breakdown in communication was.  Patient was not set up with a home health agency that was adequately able to manage her ventilator. Visited her [facility] based PCP on 5/22 and requested admission to [facility].      Son reports his goals are for his mother to be able to wean from the vent for at least a few hours and be able to speak with valve and possible eat some food by mouth.  He understands that she may never be able to fully wean from the vent and is accepting of this, however, he wants any reversible conditions treated. Both son and daughter say they were hoping a \"fresh set of eyes\" might come up with different answers for their mother but both agree that nothing much is different.    Review of chart from [facility] reveals that ethics consultation was requested and that goals of care conversations were held. Per notes, no dialysis was recommended and \"partial code\" was also recommended.  Currently listed as Full Code here.  Ethics committee at [facility] concurred with decisions made by [facility] staff.            Mechanical Ventilation/Vitalsigns/IsandOs:   Temp:  [97.3  F (36.3  C)-100.4  F (38  C)] 97.5  F (36.4  C)  Heart Rate:  [70-93] 73  Resp:  [9-27] 20  BP: " ()/(49-97) 112/52  FiO2 (%):  [30 %-40 %] 30 %  SpO2:  [94 %-100 %] 99 %      Intake/Output Summary (Last 24 hours) at 06/07/18 0644  Last data filed at 06/07/18 1000   Gross per 24 hour   Intake              140 ml   Output             1110 ml   Net             -970 ml   +6.7 liters    Ventilation Mode: CPAP/PS  (Continuous positive airway pressure with Pressure Support)  FiO2 (%): 30 %  Rate Set (breaths/minute): 8 breaths/min  Tidal Volume Set (mL): 360 mL  PEEP (cm H2O): 5 cmH2O  Pressure Support (cm H2O): 8 cmH2O  Oxygen Concentration (%): 30 %  Resp: 20    Day since: many many months         Physical Examination:     General: Stated age, NAD  HEENT: tracheostomy, atraumatic  Lungs: LCTAB anteriorly  CVS: RRR  Abdomen: +BS, soft and non tender  Extremities/musculoskeletal: warm, edematous  Neurology: awake and follows commands  Skin: normal  Psychiatry: calm  Exam of Line sites:  PICC looks OK            Feeding/Glucose:   Tube feeds    Blood glucose/Insulin requirement last 24 hours: sliding scale and long acting         Medications:       chlorhexidine  15 mL Swish & Spit BID     ciprofloxacin  250 mg Oral BID     fluticasone  1 spray Both Nostrils Daily     insulin aspart  1-6 Units Subcutaneous Q4H     insulin glargine  20 Units Subcutaneous At Bedtime     ipratropium - albuterol 0.5 mg/2.5 mg/3 mL  1 vial Nebulization TID     lactobacillus rhamnosus (GG)  1 capsule Oral or Feeding Tube BID     melatonin tablet 6 mg  6 mg Per G Tube Q24H     metoprolol tartrate (LOPRESSOR) tablet 25 mg  25 mg Oral or Feeding Tube Q8H     mirtazapine (REMERON) tablet TABS 30 mg  30 mg Oral or Feeding Tube At Bedtime     prednisoLONE acetate  1 drop Both Eyes BID     rantidine  150 mg Oral Daily     sodium chloride (PF)  10 mL Intracatheter Q7 Days     torsemide (DEMADEX) tablet 10 mg  10 mg Oral or Feeding Tube BID     warfarin  3 mg Oral ONCE at 18:00        - MEDICATION INSTRUCTIONS -       Warfarin Therapy Reminder                   Labs/Diagnostic studies:     EKG:  sinus    Echo:  No recent    ROUTINE ICU LABS (Last four results)  CMP    Recent Labs  Lab 06/07/18  0530 06/06/18  0400 06/05/18 0435 06/04/18  0400 06/03/18  0420 06/02/18  0425    139 138 138 138 136   POTASSIUM 3.5 3.7 3.8 3.7 4.2 4.5   CHLORIDE 96 98 98 97 98 98   CO2 36* 34* 34* 34* 32 34*   ANIONGAP 7 7 6 7 8 4   * 151* 150* 111* 148* 174*   BUN 63* 64* 67* 73* 77* 88*   CR 1.20* 1.15* 1.23* 1.39* 1.50* 1.75*   GFRESTIMATED 42* 45* 41* 36* 33* 27*   GFRESTBLACK 51* 54* 50* 43* 40* 33*   KRISTOFER 8.1* 8.2* 8.5 8.4* 8.2* 8.3*   PHOS  --   --   --   --  3.3  --    PROTTOTAL  --   --   --   --   --  5.4*   ALBUMIN  --   --   --   --   --  1.9*   BILITOTAL  --   --   --   --   --  0.7   ALKPHOS  --   --   --   --   --  172*   AST  --   --   --   --   --  25   ALT  --   --   --   --   --  28     CBC    Recent Labs  Lab 06/07/18 0530 06/06/18 0400 06/05/18 0435 06/04/18  0400   WBC 10.6 12.0* 14.8* 16.8*   RBC 3.19* 3.05* 3.24* 3.31*   HGB 9.1* 8.9* 9.5* 9.5*   HCT 29.4* 28.0* 29.7* 30.2*   MCV 92 92 92 91   MCH 28.5 29.2 29.3 28.7   MCHC 31.0* 31.8 32.0 31.5   RDW 18.0* 18.1* 18.0* 17.5*    245 248 227     INR    Recent Labs  Lab 06/07/18 0530 06/06/18 0400 06/05/18 0435 06/04/18  0400   INR 1.56* 1.70* 1.53* 1.53*     Arterial Blood GasNo lab results found in last 7 days.    Cultures:    Recent Labs  Lab 06/01/18 2015   CULT >100,000 colonies/mLKlebsiella oxytoca*  >100,000 colonies/mLEnterococcus faecalis*     Blood culture:  Invalid input(s): BC   Urine culture:  No results for input(s): URC in the last 168 hours.            Imaging:     CXR:  On 6/3 kyphosis and bilateral lower lobe opacities.     CT:  No recent         Assessment and Plan:     Summary:  Jacques Solis is a 88 year old female with past history A-fib, CHF, severe AS, DM II, asthma, chronic respiratory failure with vent dependence who was discharged home from [facility] on  5/18/18 but returned to clinic on 5/22 and requested hospitalization because of inability to manage respiratory status at home.    Neurology and Psychiatry:  1. Dementia with superimposed encephalopathy:  Per [facility] chart her baseline prior to this prolonged illness was being oriented to self and able to ansswer basic questions. Was on Nemenda last year prior to hospitalization. Valproic acid started this hospitalization for agitation. Son feels it made her too sleepy.  - continue PTA Seroquel PRN with increased 12.5   - remeron 30 mg  - Melatonin 6mg QHS    Pulmonary:   1. Acute on chronic hypoxic respiratory failure with vent dependence:  Chronic failure felt due to pleural effusions and overall debility. Pulmonologist at [facility] felt she would chronically be vent dependent and weaning efforts were stopped.  CT chest in ED showed small to moderated loculated bilateral pleural effusions with atelectasis.  Also a 4 cm gil opacity in left upper lobe, but no SIRS criteria present.  Tolerating some time on pressure support and some time on passy wilner.  - Continue pressure support trials and passy wilner.   - Working to arrange discharge to home. Have settled on [facility], who will be able to manage the ventilator but can't take patient until Monday. Patient may be able to find her own PCP separately from this.   2. Reactive airways disease  - Continue scheduled bronchodilators.    Cardiovascular system:   1. Paroxysmal A-fib: Also related to lower extremity arterial clot.   - continue PTA metoprolol (note son has been dosing this at 25 mg q8h) - required intermittent IV dose 5/30  - have resumed anticoagulation at family request. Had a significant bleed on Warfarin so this is high risk but son requesting it be resumed.  2. Severe AS and chronic diastolic CHF: TTE 3/2018 with EF 55-60%, severe AS (area of 0.6-0.7) and mild regurgitation, moderate MR. Not a surgical or TAVR candidate due to advanced dementia  -  continue torsemide increase back to home BID dosing 10 mg. Aim for net even.    Renal/Electrolytes:  1. Renal - Non oliguric PAULY - Cr rise times with acute bleed with hypotension:  Morris removed per family request but replaced 6/1 given ongoing skin breakdown and difficulty monitoring UOP.  Most recent culture with sensitive Klebsiella and Enterococcus, thus cipro started. Creatinine coming back down and has now plateaued.  - follow Cr, UOP, maintain hemodynamics. Aim for net even fluid balance.  - Completing a course of Ciproflozacin.    ID:  UTI as above.    GI//Nutrition:  Not safe to swallow. On chronic tube feeds. Intermittently unable to tolerate tube feeds because of ileus.  This has again become an issue today, 6/7.  - On off tube feeds as possible    Musculoskeletal/Rheumatology:  Non healing wound. Doing wound cares.    Endocrine:   DM. Sugars controlled on long acting and sliding scale insulin.    Heme/Onc:  1. Recent acute blood loss anemia with thigh hematomas (Confirmed on CT 5/31):  INR mildly elevated at 4 but prior reportedly due to supratherapeutic lovenox in setting of PAULY.  Hemoglobin now stable and Warfarin restarted. INR rising.   - Serial Hemoglobins and INRs.      ICU prophylaxis:      DVT: Warfarin     VAP: As above     Stress ulcer: Ranitidine     Restraints needed: no     Lines   Central Line/PICC - placed unknown needed: y   Arterial line - placed n needed: n   Morris catheter.  Needed: y       Prognosis:    Very very poor      Family update: care conference today    Billing: total time spend providing critical care was 40 min, excluding procedure time.    Blanca Vega MD  [phone]

## 2018-06-08 ENCOUNTER — APPOINTMENT (OUTPATIENT)
Dept: SPEECH THERAPY | Facility: CLINIC | Age: 83
DRG: 207 | End: 2018-06-08
Payer: MEDICARE

## 2018-06-08 LAB
ANION GAP SERPL CALCULATED.3IONS-SCNC: 7 MMOL/L (ref 3–14)
BUN SERPL-MCNC: 58 MG/DL (ref 7–30)
CALCIUM SERPL-MCNC: 8.2 MG/DL (ref 8.5–10.1)
CHLORIDE SERPL-SCNC: 96 MMOL/L (ref 94–109)
CO2 SERPL-SCNC: 35 MMOL/L (ref 20–32)
CREAT SERPL-MCNC: 1.25 MG/DL (ref 0.52–1.04)
ERYTHROCYTE [DISTWIDTH] IN BLOOD BY AUTOMATED COUNT: 18.5 % (ref 10–15)
GFR SERPL CREATININE-BSD FRML MDRD: 40 ML/MIN/1.7M2
GLUCOSE BLDC GLUCOMTR-MCNC: 100 MG/DL (ref 70–99)
GLUCOSE BLDC GLUCOMTR-MCNC: 115 MG/DL (ref 70–99)
GLUCOSE BLDC GLUCOMTR-MCNC: 116 MG/DL (ref 70–99)
GLUCOSE BLDC GLUCOMTR-MCNC: 138 MG/DL (ref 70–99)
GLUCOSE BLDC GLUCOMTR-MCNC: 149 MG/DL (ref 70–99)
GLUCOSE BLDC GLUCOMTR-MCNC: 94 MG/DL (ref 70–99)
GLUCOSE SERPL-MCNC: 92 MG/DL (ref 70–99)
HCT VFR BLD AUTO: 30.9 % (ref 35–47)
HGB BLD-MCNC: 9.3 G/DL (ref 11.7–15.7)
INR PPP: 1.65 (ref 0.86–1.14)
MCH RBC QN AUTO: 28.5 PG (ref 26.5–33)
MCHC RBC AUTO-ENTMCNC: 30.1 G/DL (ref 31.5–36.5)
MCV RBC AUTO: 95 FL (ref 78–100)
PLATELET # BLD AUTO: 324 10E9/L (ref 150–450)
POTASSIUM SERPL-SCNC: 3.6 MMOL/L (ref 3.4–5.3)
RBC # BLD AUTO: 3.26 10E12/L (ref 3.8–5.2)
SODIUM SERPL-SCNC: 138 MMOL/L (ref 133–144)
WBC # BLD AUTO: 11 10E9/L (ref 4–11)

## 2018-06-08 PROCEDURE — 25000132 ZZH RX MED GY IP 250 OP 250 PS 637: Mod: GY | Performed by: INTERNAL MEDICINE

## 2018-06-08 PROCEDURE — 25000132 ZZH RX MED GY IP 250 OP 250 PS 637: Mod: GY | Performed by: NURSE PRACTITIONER

## 2018-06-08 PROCEDURE — A9270 NON-COVERED ITEM OR SERVICE: HCPCS | Mod: GY | Performed by: INTERNAL MEDICINE

## 2018-06-08 PROCEDURE — 40000275 ZZH STATISTIC RCP TIME EA 10 MIN

## 2018-06-08 PROCEDURE — 85027 COMPLETE CBC AUTOMATED: CPT | Performed by: HOSPITALIST

## 2018-06-08 PROCEDURE — 80048 BASIC METABOLIC PNL TOTAL CA: CPT | Performed by: HOSPITALIST

## 2018-06-08 PROCEDURE — 85610 PROTHROMBIN TIME: CPT | Performed by: HOSPITALIST

## 2018-06-08 PROCEDURE — 20000003 ZZH R&B ICU

## 2018-06-08 PROCEDURE — 94003 VENT MGMT INPAT SUBQ DAY: CPT

## 2018-06-08 PROCEDURE — 40000239 ZZH STATISTIC VAT ROUNDS

## 2018-06-08 PROCEDURE — 94640 AIRWAY INHALATION TREATMENT: CPT | Mod: 76

## 2018-06-08 PROCEDURE — 99223 1ST HOSP IP/OBS HIGH 75: CPT | Performed by: NURSE PRACTITIONER

## 2018-06-08 PROCEDURE — 40000225 ZZH STATISTIC SLP WARD VISIT: Performed by: SPEECH-LANGUAGE PATHOLOGIST

## 2018-06-08 PROCEDURE — 94640 AIRWAY INHALATION TREATMENT: CPT

## 2018-06-08 PROCEDURE — A9270 NON-COVERED ITEM OR SERVICE: HCPCS | Mod: GY | Performed by: NURSE PRACTITIONER

## 2018-06-08 PROCEDURE — 92507 TX SP LANG VOICE COMM INDIV: CPT | Mod: GN | Performed by: SPEECH-LANGUAGE PATHOLOGIST

## 2018-06-08 PROCEDURE — 25000132 ZZH RX MED GY IP 250 OP 250 PS 637: Mod: GY | Performed by: HOSPITALIST

## 2018-06-08 PROCEDURE — 40000008 ZZH STATISTIC AIRWAY CARE

## 2018-06-08 PROCEDURE — 00000146 ZZHCL STATISTIC GLUCOSE BY METER IP

## 2018-06-08 PROCEDURE — 25000125 ZZHC RX 250: Performed by: INTERNAL MEDICINE

## 2018-06-08 PROCEDURE — 25000131 ZZH RX MED GY IP 250 OP 636 PS 637: Mod: GY | Performed by: INTERNAL MEDICINE

## 2018-06-08 PROCEDURE — 99291 CRITICAL CARE FIRST HOUR: CPT | Performed by: INTERNAL MEDICINE

## 2018-06-08 PROCEDURE — A9270 NON-COVERED ITEM OR SERVICE: HCPCS | Mod: GY | Performed by: HOSPITALIST

## 2018-06-08 RX ORDER — WARFARIN SODIUM 4 MG/1
4 TABLET ORAL
Status: COMPLETED | OUTPATIENT
Start: 2018-06-08 | End: 2018-06-08

## 2018-06-08 RX ADMIN — TORSEMIDE 10 MG: 10 TABLET ORAL at 17:44

## 2018-06-08 RX ADMIN — WARFARIN SODIUM 4 MG: 4 TABLET ORAL at 17:44

## 2018-06-08 RX ADMIN — FLUTICASONE PROPIONATE 1 SPRAY: 50 SPRAY, METERED NASAL at 08:57

## 2018-06-08 RX ADMIN — Medication 1 CAPSULE: at 08:56

## 2018-06-08 RX ADMIN — PREDNISOLONE ACETATE 1 DROP: 1.2 SUSPENSION/ DROPS OPHTHALMIC at 21:31

## 2018-06-08 RX ADMIN — METOPROLOL TARTRATE 25 MG: 25 TABLET ORAL at 08:57

## 2018-06-08 RX ADMIN — PREDNISOLONE ACETATE 1 DROP: 1.2 SUSPENSION/ DROPS OPHTHALMIC at 08:57

## 2018-06-08 RX ADMIN — CHLORHEXIDINE GLUCONATE 15 ML: 1.2 RINSE ORAL at 08:57

## 2018-06-08 RX ADMIN — INSULIN ASPART 1 UNITS: 100 INJECTION, SOLUTION INTRAVENOUS; SUBCUTANEOUS at 20:45

## 2018-06-08 RX ADMIN — IPRATROPIUM BROMIDE AND ALBUTEROL SULFATE 3 ML: .5; 3 SOLUTION RESPIRATORY (INHALATION) at 19:53

## 2018-06-08 RX ADMIN — IPRATROPIUM BROMIDE AND ALBUTEROL SULFATE 3 ML: .5; 3 SOLUTION RESPIRATORY (INHALATION) at 07:08

## 2018-06-08 RX ADMIN — MIRTAZAPINE 30 MG: 7.5 TABLET ORAL at 22:24

## 2018-06-08 RX ADMIN — Medication: at 14:05

## 2018-06-08 RX ADMIN — INSULIN GLARGINE 10 UNITS: 100 INJECTION, SOLUTION SUBCUTANEOUS at 22:24

## 2018-06-08 RX ADMIN — TORSEMIDE 10 MG: 10 TABLET ORAL at 08:56

## 2018-06-08 RX ADMIN — CHLORHEXIDINE GLUCONATE 15 ML: 1.2 RINSE ORAL at 21:00

## 2018-06-08 RX ADMIN — CIPROFLOXACIN HYDROCHLORIDE 250 MG: 250 TABLET, FILM COATED ORAL at 08:56

## 2018-06-08 RX ADMIN — METOPROLOL TARTRATE 25 MG: 25 TABLET ORAL at 17:44

## 2018-06-08 RX ADMIN — METOPROLOL TARTRATE 25 MG: 25 TABLET ORAL at 00:36

## 2018-06-08 RX ADMIN — RANITIDINE HYDROCHLORIDE 150 MG: 150 SOLUTION ORAL at 08:56

## 2018-06-08 RX ADMIN — Medication 1 CAPSULE: at 20:59

## 2018-06-08 RX ADMIN — CIPROFLOXACIN HYDROCHLORIDE 250 MG: 250 TABLET, FILM COATED ORAL at 20:59

## 2018-06-08 RX ADMIN — MELATONIN 6 MG: 3 TAB ORAL at 20:48

## 2018-06-08 NOTE — PROGRESS NOTES
BRIEF NUTRITION NOTE:    Was asked to increase TF towards goal by Dr. Vega during rounds today.  A full Nutrition Reassessment was completed 6/7.  See note for details.    NEW FINDINGS:  BM x4 yest, x2 today.  Yesterday pt had some belching, but no further emesis.    INTERVENTIONS:  Enteral Nutrition - Modify rate --> Increase TF Nepro now to 25 mL/hr;  After 8 hrs increase to 40 mL/hr x 20 hrs (held for Cipro BID) = 1440 kcal (23 kcal/kg), 65 g protein (1 gm/kg), 129 g CHO, 10 g fiber, 584 mL H2O.    Maria Isabel Miller, RD, LD, CNSC

## 2018-06-08 NOTE — PROGRESS NOTES
[facility] ICU RESPIRATORY NOTE  Date of Admission: 5/21/2018  Date of Intubation (most recent):Vent dependent  Reason for Mechanical Ventilation:Acute on chronic resp. failure  Number of Days on Mechanical Ventilation:vent dependent  Met Criteria for Pressure Support Trial:Yes  Length of Pressure Support Trial:  Reason for Stopping Pressure Support Trial: Rest overnight.     No lab results found in last 7 days.    Ventilation Mode: CMV/AC  (Continuous Mandatory Ventilation/ Assist Control)  FiO2 (%): 30 %  Rate Set (breaths/minute): 8 breaths/min  Tidal Volume Set (mL): 360 mL  PEEP (cm H2O): 5 cmH2O  Pressure Support (cm H2O): 10 cmH2O  Oxygen Concentration (%): 30 %  Resp: 15    Will continue to follow.     Jose Arellano RT

## 2018-06-08 NOTE — CONSULTS
[facility]    Palliative Care Consultation     Jacques Solis  MRN# [id number]  Date of Admission:  2018  Date of Service (when I saw the patient): 18  Reason for consult: Consulted by Dr. Vega for Decisional support, Patient and family support    Assessment & Plan   Jacques Solis is a 88 year old female with PMH significant for advanced dementia with intermittent encephalopathy, a.fib, CHF, severe AS, DM2, recent acute blood loss anemia 2/2 thigh hematoma with resultant chronic non-healing wound, PE, and chronic respiratory failure with ventilator dependence, who presents from home with worsening respiratory status after being discharged from [facility]. Of note, throughout extensive hospitalizations at [facility], Palliative care and Ethics were extensively involved in pt's care. Ethics was consulted this admission and recommends DNR and no escalation of care. Dr. Vega had a family meeting on ; family recognizes that pt is dying, but hopes to bring her home and have more meaningful time. We are consulted for decisional support and pt and family support.     Symptoms/Recommendations   -Continue present level of ICU cares and ventilator support     Support/Coping  - ; he was a rabbi   -2 adult children, Jen and Yariel   -Pt is Taoist and reportedly has support from her own [facility]     Decisional Support, Goals of Care, Counseling & Coordination  Decisional Capacity Intact?  -No  Health Care Directive on File?  -Yes, I did not review this today   Code Status/Resuscitation Preferences?  -Full     Discussion  Visited with Jacques this AM. She is working with RT and SLP, angelay wilner was placed and pt was able to speak and interact. Jacques is alert and conversant, but pleasantly confused. She knows she is in the hospital, but does not understand the context. She notes that it is 10AM when looking at the clock (which is correct). Her caregiver, Christine, is present. Jacques  "thinks she has known her for the last year, and that they are coworkers. Jacques denies pain, nausea, or unpleasant symptoms. Her breathing is quite stable while she is interacting with us, but there is some mild audible congestion with a deep intentional groan. She seems content. She is able to reach out to my hand and hold it. She is not able to tell me what she is hoping for. She tells me she worries for her children, simply because they are her children, not for any other specific reason. She states a couple times, \"help me.\" When enquiring about her Yarsanism and dominic, she is not able to state anything sensical, other than not knowing the whereabouts of her bible. She then indicates that our conversation is over and pleasantly says goodbye.     I attempted to contact her daughter, Jen, this AM. A voicemail was left. She has not returned my phone call. I will addend this note if I do hear from her.     I called her son, Yraiel, and spoke to him briefly before he needed to return to work. I shared with him my interaction with his mom earlier in the day. We talk about the plan to try to get Jacques back home, likely next week. Yariel expresses concern for care for her at home, specifically regarding the air mattress for care of her RLE wound. He is requesting a letter from the medical team to help with an appeal process. I notified Yariel that I would pass along this information to the ICU team.     I express concern to Yariel that given how critical his mother's health is, it will continue to be very challenging to care for Jacques, specifically at home. As much as we want to continue to support this wish, I am worried that the level of care that she needs will never be able to be replicated at home to help prevent her from coming back and forth to the hospital. I prepare Yariel that the likelihood is that she will continue to return to the ICU with each setback.     I offer to discuss treatment options with Yariel, " specifically regarding hospice. Yariel is interested in learning more about hospice, but does not have any further time to chat today. Per his request, I sent him an e-mail with my contact information.   [Hi Yariel,  My name is Tiffanie Tim, palliative NP; we spoke via phone today. You can reach me Monday-Friday, [phone]. My phone number is [phone]. If you call me and I do not answer, I do not have voicemail. But I will see a missed call and will be able to return your phone call, usually within a couple hours. If it is easier, you can always schedule a time with me, letting me know via e-mail what day/time works best. Perhaps it would work well to involve your sister, Jen, in our conversation. Let me know how I can be of further assistance.   Thanks,  Tiffanie Tim]     Hopefully we can touch base next week to review care plan options.     Case reviewed with bedside RN Terrell, SLP Lisa, unit CC Judie and Dr. Vega.     Thank you for involving us in the care of this patient and family. We will continue to follow. Please do not hesitate to contact me with questions or concerns or the on-call provider for our team if evening or weekend.    Tiffanie REINA, [facility]  Palliative Medicine   Pager [phone]    Attestation:  Total time on the floor involved in the patient's care: 70 minutes  Total time spent in counseling/care coordination: >50%    Chief Complaint   Worsening SOB after being discharged home from [facility], on long term ventilator     History is obtained from the patient, staff, family and extensive chart review.     Past Medical History    I have reviewed this patient's medical history and updated it with pertinent information if needed.   Past Medical History:   Diagnosis Date     Alzheimer's dementia     Per Amrit Garza notes     Aortic stenosis      Asthma      Atrial fibrillation (H)      CHF (congestive heart failure) (H)      CKD (chronic kidney disease)      Depressive disorder       Diabetes type 2 with atherosclerosis of arteries of extremities (H)      Pulmonary embolism (H)      Sepsis (H)        Past Surgical History   I have reviewed this patient's surgical history and updated it with pertinent information if needed.  No past surgical history on file.    Social History   Living situation: Has been in and out of the hospital and LTACH since 2017. Recently discharged to home with home ventilatory support, only lasted 3 days at home before returning to the hospital     Family system:  , 2 adult children Jen and Yariel. Yariel is an anesthesiologist     Self-identified support system: As above     Employment/education: ND    Activities/interests: ND    Use of community resources: Home care ventilator services     Orthodox affiliation: Confucianist     Involvement in dominic community: Yes     Impact of illness on patient: Pt has many advanced comorbidities and her overall prognosis is very poor, family continues to wish for restorative measures     Family History   I have reviewed this patient's family history and updated it with pertinent information if needed.   No family history on file.    Allergies   Allergies   Allergen Reactions     Amikacin      Amiodarone      Codeine      Dexmedetomidine      bradycardia     Lisinopril      Penicillins      Sulfa Drugs        Medications   Current Facility-Administered Medications Ordered in Epic   Medication Dose Route Frequency Last Rate Last Dose     acetaminophen (TYLENOL) tablet 650 mg  650 mg Oral Q4H PRN        Or     acetaminophen (TYLENOL) solution 650 mg  650 mg Per NG tube Q4H PRN   650 mg at 18 1545     bisacodyl (DULCOLAX) Suppository 10 mg  10 mg Rectal Daily PRN         chlorhexidine (PERIDEX) 0.12 % solution 15 mL  15 mL Swish & Spit BID   15 mL at 18 0857     ciprofloxacin (CIPRO) tablet 250 mg  250 mg Oral BID   250 mg at 18 0856     glucose gel 15-30 g  15-30 g Oral Q15 Min PRN        Or      dextrose 50 % injection 25-50 mL  25-50 mL Intravenous Q15 Min PRN        Or     glucagon injection 1 mg  1 mg Subcutaneous Q15 Min PRN         fluticasone (FLONASE) 50 MCG/ACT spray 1 spray  1 spray Both Nostrils Daily   1 spray at 06/08/18 0857     hydrogen peroxide 3 % solution   Topical TID PRN         insulin aspart (NovoLOG) inj (RAPID ACTING)  1-6 Units Subcutaneous Q4H   1 Units at 06/06/18 2042     insulin glargine (LANTUS) injection 10 Units  10 Units Subcutaneous At Bedtime         ipratropium - albuterol 0.5 mg/2.5 mg/3 mL (DUONEB) neb solution 3 mL  1 vial Nebulization TID   3 mL at 06/08/18 0708     lactobacillus rhamnosus (GG) (CULTURELL) capsule 1 capsule  1 capsule Oral or Feeding Tube BID   1 capsule at 06/08/18 0856     melatonin tablet 6 mg  6 mg Per G Tube Q24H   6 mg at 06/07/18 2134     metoprolol (LOPRESSOR) injection 5 mg  5 mg Intravenous Q6H PRN   5 mg at 05/31/18 1200     metoprolol tartrate (LOPRESSOR) tablet 25 mg  25 mg Oral or Feeding Tube Q8H   25 mg at 06/08/18 0857     mirtazapine (REMERON) tablet TABS 30 mg  30 mg Oral or Feeding Tube At Bedtime   30 mg at 06/07/18 2135     naloxone (NARCAN) injection 0.1-0.4 mg  0.1-0.4 mg Intravenous Q2 Min PRN         ondansetron (ZOFRAN-ODT) ODT tab 4 mg  4 mg Oral Q6H PRN        Or     ondansetron (ZOFRAN) injection 4 mg  4 mg Intravenous Q6H PRN   4 mg at 06/06/18 2138     Patient is already receiving anticoagulation with heparin, enoxaparin (LOVENOX), warfarin (COUMADIN)  or other anticoagulant medication   Does not apply Continuous PRN         polyethylene glycol (MIRALAX/GLYCOLAX) Packet 17 g  17 g Oral or Feeding Tube TID PRN   17 g at 06/03/18 0907     potassium chloride (KLOR-CON) Packet 20-40 mEq  20-40 mEq Oral or Feeding Tube Q2H PRN   20 mEq at 05/26/18 1150     potassium chloride 10 mEq in 100 mL intermittent infusion with 10 mg lidocaine  10 mEq Intravenous Q1H PRN         potassium chloride 10 mEq in 100 mL sterile water  intermittent infusion (premix)  10 mEq Intravenous Q1H PRN         potassium chloride 20 mEq in 50 mL intermittent infusion  20 mEq Intravenous Q1H PRN         potassium chloride SA (K-DUR/KLOR-CON M) CR tablet 20-40 mEq  20-40 mEq Oral Q2H PRN         prednisoLONE acetate (PRED MILD) 0.12 % ophthalmic susp 1 drop  1 drop Both Eyes BID   1 drop at 06/08/18 0857     prochlorperazine (COMPAZINE) injection 5 mg  5 mg Intravenous Q6H PRN        Or     prochlorperazine (COMPAZINE) tablet 5 mg  5 mg Oral Q6H PRN        Or     prochlorperazine (COMPAZINE) Suppository 12.5 mg  12.5 mg Rectal Q12H PRN         QUEtiapine (SEROquel) half-tab 12.5 mg  12.5 mg Oral Q8H PRN   12.5 mg at 06/06/18 0146     ranitidine (Zantac) syrup 150 mg  150 mg Oral Daily   150 mg at 06/08/18 0856     sennosides (SENOKOT) syrup 5 mL  5 mL Oral or Feeding Tube Daily PRN         simethicone (MYLICON) suspension 40 mg  40 mg Oral Q6H PRN   40 mg at 06/03/18 0927     sodium chloride (PF) 0.9% PF flush 10 mL  10 mL Intracatheter Q7 Days   10 mL at 06/02/18 1940     sodium chloride (PF) 0.9% PF flush 10-20 mL  10-20 mL Intracatheter Q1H PRN   10 mL at 06/06/18 2139     sodium phosphate 15 mmol in D5W intermittent infusion  15 mmol Intravenous Daily PRN         sodium phosphate 20 mmol in D5W intermittent infusion  20 mmol Intravenous Q6H PRN         sodium phosphate 25 mmol in D5W intermittent infusion  25 mmol Intravenous Q8H PRN         torsemide (DEMADEX) tablet 10 mg  10 mg Oral or Feeding Tube BID   10 mg at 06/08/18 0856     warfarin (COUMADIN) tablet 4 mg  4 mg Oral ONCE at 18:00         Warfarin Therapy Reminder (Check START DATE - warfarin may be starting in the FUTURE)  1 each Does not apply Continuous PRN         zinc oxide (DESITIN) 40 % ointment   Topical Q1H PRN         Current Outpatient Prescriptions Ordered in Epic   Medication     blood glucose (NO BRAND SPECIFIED) lancets standard     blood glucose calibration (NO BRAND SPECIFIED)  solution     blood glucose monitoring (NO BRAND SPECIFIED) meter device kit     blood glucose monitoring (NO BRAND SPECIFIED) test strip       Review of Systems   The comprehensive review of systems is negative other than noted here and in the assessment/plan.    Palliative Symptom Review (0=no symptom/no concern, 1=mild, 2=moderate, 3=severe):  Pain: 0  Nausea: 0  Shortness of Breath: 0-1    Physical Exam   Temp: 98.2  F (36.8  C) Temp src: Bladder BP: 114/50   Heart Rate: 76 Resp: 15 SpO2: 97 % O2 Device: Mechanical Ventilator    Vitals:    06/06/18 0400 06/07/18 0402 06/08/18 0000   Weight: 87 kg (191 lb 12.8 oz) 87.9 kg (193 lb 12.6 oz) 85.5 kg (188 lb 7.9 oz)     CONSTITUTIONAL: Chronically ill elderly woman seen lying in ICU bed in NAD, alert and conversant, yet disoriented and with tangential speech. She appears calm, comfortable and generally cooperative.  HEENT: NCAT, dentures in place   NECK: Trach   RESPIRATORY: NL respiratory effort with support of trach, passy wilner in place   MUSCULOSKELETAL: Able to move upper extremities without difficulty   NEUROLOGIC: Alert and conversant, pleasantly confused   PSYCH: Affect engaged     Data   Results for orders placed or performed during the hospital encounter of 05/21/18 (from the past 24 hour(s))   Glucose by meter   Result Value Ref Range    Glucose 70 70 - 99 mg/dL   Glucose by meter   Result Value Ref Range    Glucose 90 70 - 99 mg/dL   Glucose by meter   Result Value Ref Range    Glucose 96 70 - 99 mg/dL   Glucose by meter   Result Value Ref Range    Glucose 94 70 - 99 mg/dL   Basic metabolic panel   Result Value Ref Range    Sodium 138 133 - 144 mmol/L    Potassium 3.6 3.4 - 5.3 mmol/L    Chloride 96 94 - 109 mmol/L    Carbon Dioxide 35 (H) 20 - 32 mmol/L    Anion Gap 7 3 - 14 mmol/L    Glucose 92 70 - 99 mg/dL    Urea Nitrogen 58 (H) 7 - 30 mg/dL    Creatinine 1.25 (H) 0.52 - 1.04 mg/dL    GFR Estimate 40 (L) >60 mL/min/1.7m2    GFR Estimate If Black 49 (L)  >60 mL/min/1.7m2    Calcium 8.2 (L) 8.5 - 10.1 mg/dL   CBC with platelets   Result Value Ref Range    WBC 11.0 4.0 - 11.0 10e9/L    RBC Count 3.26 (L) 3.8 - 5.2 10e12/L    Hemoglobin 9.3 (L) 11.7 - 15.7 g/dL    Hematocrit 30.9 (L) 35.0 - 47.0 %    MCV 95 78 - 100 fl    MCH 28.5 26.5 - 33.0 pg    MCHC 30.1 (L) 31.5 - 36.5 g/dL    RDW 18.5 (H) 10.0 - 15.0 %    Platelet Count 324 150 - 450 10e9/L   INR   Result Value Ref Range    INR 1.65 (H) 0.86 - 1.14   Glucose by meter   Result Value Ref Range    Glucose 100 (H) 70 - 99 mg/dL   Glucose by meter   Result Value Ref Range    Glucose 115 (H) 70 - 99 mg/dL

## 2018-06-08 NOTE — PROGRESS NOTES
Pt was on PMV trial and right away pt started to cough and could not tolerate and I switched the pt to full support on the vent and suctioned as well. Pt  was able to tolerate the PMV for about 20 mins . Pt became short of breath and desatured to 88% and Fi02 was increased from 30% to 40% for briefly.  Will continue to follow.  6/8/2018  Esthela Echevarria

## 2018-06-08 NOTE — PROGRESS NOTES
Pt's tube feeding down to trickle feeding- note blood glucose mostly in the 90s today. Received 20U glargine last night. Will decrease basal insulin dose to 5U and follow blood glucose.

## 2018-06-08 NOTE — CARE CONFERENCE
"Spoke at length with son and daughter about discharge plan and overall prognosis. Both very much want to get her home. They are very much wanting to find a specific doctor who will manage her home ventilator.  I shared with them that this may not be a realistic goal and that the current care model is that a home health care agency manages ventilators and that if there is a significant problem the patient needs to come back to the hospital for an evaluation.  Also want a doctors note for Medicare to pay for an air mattress as well as a referral to a geriatrician. Also awaiting INR machine. All agree that discharge will not happen until Monday at the earliest for INR to be therapeutic and home care in place.     I shared with them the health care team concern's that no matter how robust the support at home, that she will require readmission for a new setback in the coming days to weeks. Expressed my strong opinion that re escalating care in this situation would not be beneficial. Son says he knows \"she is dying\".  Son and daughter asked about possible time course of illness. Discussed some of the things that might happen in a patient with chronic critical illness such as their mother.    Asked if they would be open to speaking with hospice. Daughter appeared more open to the idea than son, but both said they'd be willing to discuss this as an option.    Will re consult palliative care and will ask for Hospice opinion.  For tomorrow, 6/8, neither child will be present at hospital.  Son, Yariel, will be available by phone between 12 and 12:30. Daughter, Jen, will be available all day on the phone other than early afternoon when she has a doctors appointment.    Additional critical care time for this conference: 70 minutes    Blanca Vega MD  "

## 2018-06-08 NOTE — PLAN OF CARE
Problem: Patient Care Overview  Goal: Plan of Care/Patient Progress Review  PT: Per chart review, noted new plan for palliative consult to discuss possible hospice option and further goals of care. Will hold PT this am until further goals of care are determined.

## 2018-06-08 NOTE — PLAN OF CARE
"Problem: Patient Care Overview  Goal: Plan of Care/Patient Progress Review  Discharge Planner SLP   Patient plan for discharge: Not stated.   Current status: Patient seen for Passy-Anuel speaking valve trials this am, able to tolerate for 21 minutes before decline in respiratory function. Patient often tangential to irrelevant topics (\"needing to check on the cookie sheets, get something for my ,\" etc.). Patient able to follow commands for swallowing exercises after oral cares with mouthwash were completed. Recommend continue PMV trials with SLP/RT on Monday, as appropriate.    Barriers to return to prior living situation: Weakness, confusion, respiratory status.   Recommendations for discharge: Pending family and palliative care team decision-making.   Rationale for recommendations: Recommend follow up SLP services at discharge if restorative goals are pursued.          Entered by: Lisa Andrews 06/08/2018 12:20 PM         "

## 2018-06-08 NOTE — PROGRESS NOTES
Tube feed increased to 25cc/hr w/ 70cc q4hr FWF per nutrition.  Increase rate to goal rate of 40cc/hr at 2015.

## 2018-06-08 NOTE — PROGRESS NOTES
"Care Coordination    Placed call to [facility] to check on INR machine, they received fax and are updating MD info. They will call daughter to get more info. Received several calls regarding Air mattress. Again, Patient would have to qualify through Medicare, see my notes, in order for Air mattress to be covered.  This is no appeal process to appeal for it. Per Nicky martinez, patient would have to have a diagnosis code to even appeal, To her knowledge no one has appealed and won.  Relayed all info from today to Son Yariel via phone at 3:40pm.. He stated\"I just want her to go home\".    Judie Lloyd RN BSN  [facility] Care Coordinator  Phone [phone]    "

## 2018-06-08 NOTE — PROGRESS NOTES
[facility]  Comprehensive Daily ICU Note        Jacques Solis MRN# [id number]   Age: 88 year old YOB: 1930     Date of Admission: 5/21/2018    Primary care provider: Michael Mccarthy     CODE STATUS: Currently full. Ethics committee supports change to DNR.       Problem List:   Dementia possibly with superimposed encephalopathy  Acute on chronic respiratory failure, no significant chance of weaning  Afib w complication of arterial embolism  Significant bleed secondary to Warfarin   Anasarca  Recurrent Ileus with inability to tolerate nutrition  PAULY secondary to GI bleed bleed  Diabetes         Treatment goals for next 24 hours:   1. Continue alternating PS and CMV mode.  2. Continue discharge planning. Family hoping to go home early next week with home care, but are open to hearing about hospice. Their expectations for home care may not be able to be met.   3. Tube feeds as able. Re attempt titration to goal  4. Medical management.    Blanca Vega MD      Subjective/ Last 24 hours:     Jacques Solis is a 88 year old female who presents with inadequate home support and possible acute on chronic hypoxic respiratory failure. She has essentially been hospitalized since September 2017 when she was admitted to [facility] for critical limb ischemia and underwent balloon angioplasty which was complicated by femoral artery perforation with hemorrhagic shock.  She was trached and pegged on 11/13/17.  She was also noted to have some encephalopathy on a baseline of Alzheimer's dementia. She has essentially moved back and forth from [place] and [facility] and then to [facility] and [facility] ever since. Her other problems include severe aortic stenosis - not a candidate for TAVR, chronic diastolic heart failure, chronic bilateral recurrent pleural effusions, chronic vent dependence, history of pulmonary embolism, and atrial fibrillation. Her most recent admission to [facility] 4/26-5/4 was due to acute blood loss  "anemia due to left thigh hematoma in addition to renal failure due to tobramycin toxicity for multi-drug resistant Pseudomonas.  She also underwent debridement of a right lower extremity wound due to an infected hematoma.  She was ultimately discharged to [facility], where she stayed until discharge home on 5/18/18.  Pulmonology felt that she would not be able to wean and recommended discharge home or to TCU for chronic vented patients, and son elected for discharge home.      Son felt he was not sent home with adequate support or resources to manage patient at home and was also concerned that she was having a respiratory decompensation. Visited her [facility] based PCP on 5/22 and requested admission to [facility].      On admission here, son reported his goals are for his mother to be able to wean from the vent for at least a few hours and be able to speak with valve and possible eat some food by mouth.  He understands that she may never be able to fully wean from the vent and is accepting of this, however, he wants any reversible conditions treated. Both son and daughter say they were hoping a \"fresh set of eyes\" might come up with different answers for their mother but both agree that nothing much is different.    Review of chart from [facility] reveals that ethics consultation was requested and that goals of care conversations were held. Per notes, no dialysis was recommended and \"partial code\" was also recommended.  Currently listed as Full Code here.  Ethics committee at [facility] concurred with decisions made by [facility] staff.     Since admission, no significant changes have occurred other than recurrent bleed into thigh (related to Coumadin) and recurrent ileus. Care conference held on 6/7, see separate note.            Mechanical Ventilation/Vitalsigns/IsandOs:   Temp:  [97.2  F (36.2  C)-98.2  F (36.8  C)] 98.2  F (36.8  C)  Heart Rate:  [69-92] 77  Resp:  [11-37] 15  BP: ()/() 106/51  FiO2 " (%):  [30 %-40 %] 30 %  SpO2:  [86 %-100 %] 97 %    Intake/Output Summary (Last 24 hours) at 06/08/18 0830  Last data filed at 06/08/18 0600   Gross per 24 hour   Intake              440 ml   Output              980 ml   Net             -540 ml   +6.3 liters    Ventilation Mode: CPAP/PS  (Continuous positive airway pressure with Pressure Support)  FiO2 (%): 30 %  Rate Set (breaths/minute): 8 breaths/min  Tidal Volume Set (mL): 360 mL  PEEP (cm H2O): 5 cmH2O  Pressure Support (cm H2O): 10 cmH2O  Oxygen Concentration (%): 30 %  Resp: 15    Day since: many many months         Physical Examination:     General: Stated age, NAD  HEENT: tracheostomy, atraumatic  Lungs: LCTAB anteriorly  CVS: RRR  Abdomen: +BS, soft and non tender  Extremities/musculoskeletal: warm, edematous, right lower leg wound bandaged  Neurology: asleep  Skin: normal  Psychiatry: calm  Exam of Line sites:  PICC looks OK            Feeding/Glucose:   Tube feeds    Blood glucose/Insulin requirement last 24 hours: sliding scale and long acting (decreased dose)         Medications:       chlorhexidine  15 mL Swish & Spit BID     ciprofloxacin  250 mg Oral BID     fluticasone  1 spray Both Nostrils Daily     insulin aspart  1-6 Units Subcutaneous Q4H     insulin glargine  5 Units Subcutaneous At Bedtime     ipratropium - albuterol 0.5 mg/2.5 mg/3 mL  1 vial Nebulization TID     lactobacillus rhamnosus (GG)  1 capsule Oral or Feeding Tube BID     melatonin tablet 6 mg  6 mg Per G Tube Q24H     metoprolol tartrate (LOPRESSOR) tablet 25 mg  25 mg Oral or Feeding Tube Q8H     mirtazapine (REMERON) tablet TABS 30 mg  30 mg Oral or Feeding Tube At Bedtime     prednisoLONE acetate  1 drop Both Eyes BID     rantidine  150 mg Oral Daily     sodium chloride (PF)  10 mL Intracatheter Q7 Days     torsemide (DEMADEX) tablet 10 mg  10 mg Oral or Feeding Tube BID        - MEDICATION INSTRUCTIONS -       Warfarin Therapy Reminder                  Labs/Diagnostic studies:      EKG:  sinus    Echo:  No recent    ROUTINE ICU LABS (Last four results)  CMP    Recent Labs  Lab 06/08/18  0431 06/07/18  0530 06/06/18  0400 06/05/18 0435 06/03/18  0420 06/02/18 0425    139 139 138  < > 138 136   POTASSIUM 3.6 3.5 3.7 3.8  < > 4.2 4.5   CHLORIDE 96 96 98 98  < > 98 98   CO2 35* 36* 34* 34*  < > 32 34*   ANIONGAP 7 7 7 6  < > 8 4   GLC 92 128* 151* 150*  < > 148* 174*   BUN 58* 63* 64* 67*  < > 77* 88*   CR 1.25* 1.20* 1.15* 1.23*  < > 1.50* 1.75*   GFRESTIMATED 40* 42* 45* 41*  < > 33* 27*   GFRESTBLACK 49* 51* 54* 50*  < > 40* 33*   KRISTOFER 8.2* 8.1* 8.2* 8.5  < > 8.2* 8.3*   PHOS  --   --   --   --   --  3.3  --    PROTTOTAL  --   --   --   --   --   --  5.4*   ALBUMIN  --   --   --   --   --   --  1.9*   BILITOTAL  --   --   --   --   --   --  0.7   ALKPHOS  --   --   --   --   --   --  172*   AST  --   --   --   --   --   --  25   ALT  --   --   --   --   --   --  28   < > = values in this interval not displayed.  CBC    Recent Labs  Lab 06/08/18 0431 06/07/18  0530 06/06/18 0400 06/05/18 0435   WBC 11.0 10.6 12.0* 14.8*   RBC 3.26* 3.19* 3.05* 3.24*   HGB 9.3* 9.1* 8.9* 9.5*   HCT 30.9* 29.4* 28.0* 29.7*   MCV 95 92 92 92   MCH 28.5 28.5 29.2 29.3   MCHC 30.1* 31.0* 31.8 32.0   RDW 18.5* 18.0* 18.1* 18.0*    278 245 248     INR    Recent Labs  Lab 06/08/18  0431 06/07/18  0530 06/06/18  0400 06/05/18  0435   INR 1.65* 1.56* 1.70* 1.53*     Arterial Blood GasNo lab results found in last 7 days.    Cultures:    Recent Labs  Lab 06/01/18 2015   CULT >100,000 colonies/mLKlebsiella oxytoca*  >100,000 colonies/mLEnterococcus faecalis*     Blood culture:  Invalid input(s): BC   Urine culture:  No results for input(s): URC in the last 168 hours.            Imaging:     CXR:  On 6/3 kyphosis and bilateral lower lobe opacities.     CT:  No recent         Assessment and Plan:     Summary:  Jacques Solis is a 88 year old female with past history A-fib, CHF, severe AS, DM II, asthma,  chronic respiratory failure with vent dependence who was discharged home from [facility] on 5/18/18 but returned to clinic on 5/22 and requested hospitalization because of inability to manage respiratory status at home.    Neurology and Psychiatry:  1. Dementia with superimposed encephalopathy:  Per [facility] chart her baseline dementia prior to this prolonged illness was being oriented to self and able to answer basic questions - dementia was significant enough that it disqualified her from receiving TAVR. Was on Nemenda last year prior to hospitalization.  Valproic acid started this hospitalization for agitation. Son felt it made her too sleepy so it was discontinued.  - Continue PTA Seroquel PRN   - Continue HS Mirtazipine and Melatonin  - Monitor for signs of agitation/pain and treat as necessary    Pulmonary:   1. Acute on chronic hypoxic respiratory failure with vent dependence:  Chronic failure felt due to pleural effusions and overall debility. Pulmonologist at [facility] felt she would chronically be vent dependent and weaning efforts were stopped.  CT chest in ED showed small to moderated loculated bilateral pleural effusions with atelectasis.  Also a 4 cm gil opacity in left upper lobe, but no SIRS criteria present.  Tolerating some time on pressure support and some time on passy wilner.  - Continue pressure support trials and passy wilner.   - Working to arrange discharge to home. Have settled on [facility], who will be able to manage the ventilator but can't take patient until Monday. Patient may be able to find her own PCP separately from this. Son is still focused on finding a specific doctor to manage ventilator.  I told him this does not appear to be how the system works but that I would double check.   2. Reactive airways disease  - Continue scheduled bronchodilators.    Cardiovascular system:   1. Paroxysmal A-fib: Also related to lower extremity arterial clot.   - continue PTA metoprolol (note son has  been dosing this at 25 mg q8h) - required intermittent IV dose 5/30  - have resumed anticoagulation at family request. Had a significant bleed on Warfarin so this is high risk but son requesting it be resumed.  INR still subtherapeutic.   2. Severe AS and chronic diastolic CHF: TTE 3/2018 with EF 55-60%, severe AS (area of 0.6-0.7) and mild regurgitation, moderate MR. Not a surgical or TAVR candidate due to advanced dementia  - continue torsemide at home BID dosing 10 mg. Aim for net even.    Renal/Electrolytes:  1. Renal - Non oliguric PAULY - Cr rise times with acute bleed with hypotension:  Morris removed per family request but replaced 6/1 given ongoing skin breakdown and difficulty monitoring UOP.  Most recent culture with sensitive Klebsiella and Enterococcus, thus cipro started. Creatinine coming back down and has now plateaued.  - follow Cr, UOP, maintain hemodynamics. Continue outpatient diuretics. Aim for net even fluid balance.  - Completing a course of Ciprofloxacin.    ID:  UTI as above.    GI//Nutrition:  Not safe to swallow. On chronic tube feeds. Intermittently unable to tolerate tube feeds because of ileus.  This has again become an issue today, 6/7.  - Nutrition as possible.    Musculoskeletal/Rheumatology:  Non healing wound. Doing wound cares. Children requesting that Medicare pay for air mattress, but per review it appears she does not qualify for this because her wound is not on her trunk, and because it was from a surgery not a medical issue. Will discuss with .     Endocrine:   DM. Sugars controlled on long acting and sliding scale insulin.  Sugars have been lower when tube feeds have been intermittently on hold.  - Have decreased dose of long acting insulin.     Heme/Onc:  1. Recent acute blood loss anemia with thigh hematomas (Confirmed on CT 5/31):  INR mildly elevated at 4 but prior reportedly due to supratherapeutic lovenox in setting of PAULY.  Hemoglobin now stable and Warfarin  restarted. INR rising.   - Serial Hemoglobins and INRs.  Attempting to get home INR machine for son. Will be difficult to get a consistent INR because of her inability to consistently tolerate nutrition.      ICU prophylaxis:      DVT: Warfarin     VAP: As above     Stress ulcer: Ranitidine     Restraints needed: no     Lines   Central Line/PICC - placed unknown needed: y   Arterial line - placed n needed: n   Morris catheter.  Needed: y       Prognosis:  Very very poor      Family update: long care conference evening of 6/7    Billing: total time spend providing critical care was 45 min, excluding procedure time.    Blanca Vega MD  [phone]

## 2018-06-08 NOTE — PROGRESS NOTES
[facility] ICU RESPIRATORY NOTE  Date of Admission: 5/21/2018  Date of Intubation (most recent):Vent dependent  Reason for Mechanical Ventilation:Acute on chronic resp. failure  Number of Days on Mechanical Ventilation:Vent dependent  Met Criteria for Pressure Support Trial:Yes  Length of Pressure Support Trial:PS 10/5 for 2.5 hrs before PMV, then placed back on 10/5 at 1130.  Reason for Stopping Pressure Support Trial:SpO2 dropped into the mid 80's.    Ventilation Mode: CPAP/PS  (Continuous positive airway pressure with Pressure Support)  FiO2 (%): 30 %  Rate Set (breaths/minute): 8 breaths/min  Tidal Volume Set (mL): 360 mL  PEEP (cm H2O): 5 cmH2O  Pressure Support (cm H2O): 10 cmH2O  Oxygen Concentration (%): 30 %  Resp: 14      Significant Events Today:PMV trial done on CMV settings for 20 minutes.    ABG Results:No new results    Plan:  Will cont PS settings as tolerated. Plan to place pt on CMV settings to rest overnight.  6/8/2018  Cely Valentin RRT

## 2018-06-08 NOTE — PLAN OF CARE
Problem: Patient Care Overview  Goal: Plan of Care/Patient Progress Review  Pt able to pressure support most of day, speaking valve placed this morning for 20-25min, pt able to talk w/ palliative.  Tube feed increased to 25cc/hr, increase to 40cc/hr at 2015.  Tele: SR c/ PAC's.  Morris patent w/ adequate UOP 30-60/hr.  Rt leg/calf wound dressing changed.  Turned/repositioned q2hr, up in chair.  Denies pain, no non-verbal indicators.  Plan: d/c Monday. Cont to monitor over weekend.

## 2018-06-09 LAB
ANION GAP SERPL CALCULATED.3IONS-SCNC: 9 MMOL/L (ref 3–14)
BUN SERPL-MCNC: 57 MG/DL (ref 7–30)
CALCIUM SERPL-MCNC: 8.4 MG/DL (ref 8.5–10.1)
CHLORIDE SERPL-SCNC: 95 MMOL/L (ref 94–109)
CO2 SERPL-SCNC: 35 MMOL/L (ref 20–32)
CREAT SERPL-MCNC: 1.31 MG/DL (ref 0.52–1.04)
ERYTHROCYTE [DISTWIDTH] IN BLOOD BY AUTOMATED COUNT: 18 % (ref 10–15)
GFR SERPL CREATININE-BSD FRML MDRD: 38 ML/MIN/1.7M2
GLUCOSE BLDC GLUCOMTR-MCNC: 137 MG/DL (ref 70–99)
GLUCOSE BLDC GLUCOMTR-MCNC: 144 MG/DL (ref 70–99)
GLUCOSE BLDC GLUCOMTR-MCNC: 153 MG/DL (ref 70–99)
GLUCOSE BLDC GLUCOMTR-MCNC: 158 MG/DL (ref 70–99)
GLUCOSE BLDC GLUCOMTR-MCNC: 176 MG/DL (ref 70–99)
GLUCOSE BLDC GLUCOMTR-MCNC: 179 MG/DL (ref 70–99)
GLUCOSE SERPL-MCNC: 168 MG/DL (ref 70–99)
HCT VFR BLD AUTO: 30.8 % (ref 35–47)
HGB BLD-MCNC: 9.5 G/DL (ref 11.7–15.7)
INR PPP: 2.16 (ref 0.86–1.14)
MCH RBC QN AUTO: 28.6 PG (ref 26.5–33)
MCHC RBC AUTO-ENTMCNC: 30.8 G/DL (ref 31.5–36.5)
MCV RBC AUTO: 93 FL (ref 78–100)
PLATELET # BLD AUTO: 327 10E9/L (ref 150–450)
POTASSIUM SERPL-SCNC: 3.6 MMOL/L (ref 3.4–5.3)
RBC # BLD AUTO: 3.32 10E12/L (ref 3.8–5.2)
SODIUM SERPL-SCNC: 139 MMOL/L (ref 133–144)
WBC # BLD AUTO: 10.4 10E9/L (ref 4–11)

## 2018-06-09 PROCEDURE — 20000003 ZZH R&B ICU

## 2018-06-09 PROCEDURE — 25000131 ZZH RX MED GY IP 250 OP 636 PS 637: Mod: GY | Performed by: INTERNAL MEDICINE

## 2018-06-09 PROCEDURE — 00000146 ZZHCL STATISTIC GLUCOSE BY METER IP

## 2018-06-09 PROCEDURE — 94640 AIRWAY INHALATION TREATMENT: CPT | Mod: 76

## 2018-06-09 PROCEDURE — 40000239 ZZH STATISTIC VAT ROUNDS

## 2018-06-09 PROCEDURE — 25000132 ZZH RX MED GY IP 250 OP 250 PS 637: Mod: GY | Performed by: INTERNAL MEDICINE

## 2018-06-09 PROCEDURE — A9270 NON-COVERED ITEM OR SERVICE: HCPCS | Mod: GY | Performed by: INTERNAL MEDICINE

## 2018-06-09 PROCEDURE — 85027 COMPLETE CBC AUTOMATED: CPT | Performed by: INTERNAL MEDICINE

## 2018-06-09 PROCEDURE — 25000132 ZZH RX MED GY IP 250 OP 250 PS 637: Mod: GY | Performed by: NURSE PRACTITIONER

## 2018-06-09 PROCEDURE — 27210432 ZZH NUTRITION PRODUCT RENAL BASIC LITER

## 2018-06-09 PROCEDURE — 80048 BASIC METABOLIC PNL TOTAL CA: CPT | Performed by: INTERNAL MEDICINE

## 2018-06-09 PROCEDURE — 25000125 ZZHC RX 250: Performed by: INTERNAL MEDICINE

## 2018-06-09 PROCEDURE — 99291 CRITICAL CARE FIRST HOUR: CPT | Performed by: INTERNAL MEDICINE

## 2018-06-09 PROCEDURE — 40000008 ZZH STATISTIC AIRWAY CARE

## 2018-06-09 PROCEDURE — 25000132 ZZH RX MED GY IP 250 OP 250 PS 637: Mod: GY | Performed by: HOSPITALIST

## 2018-06-09 PROCEDURE — A9270 NON-COVERED ITEM OR SERVICE: HCPCS | Mod: GY | Performed by: HOSPITALIST

## 2018-06-09 PROCEDURE — 94640 AIRWAY INHALATION TREATMENT: CPT

## 2018-06-09 PROCEDURE — 94003 VENT MGMT INPAT SUBQ DAY: CPT

## 2018-06-09 PROCEDURE — 85610 PROTHROMBIN TIME: CPT | Performed by: INTERNAL MEDICINE

## 2018-06-09 PROCEDURE — 40000275 ZZH STATISTIC RCP TIME EA 10 MIN

## 2018-06-09 PROCEDURE — A9270 NON-COVERED ITEM OR SERVICE: HCPCS | Mod: GY | Performed by: NURSE PRACTITIONER

## 2018-06-09 RX ORDER — WARFARIN SODIUM 3 MG/1
3 TABLET ORAL
Status: COMPLETED | OUTPATIENT
Start: 2018-06-09 | End: 2018-06-09

## 2018-06-09 RX ADMIN — CHLORHEXIDINE GLUCONATE 15 ML: 1.2 RINSE ORAL at 22:02

## 2018-06-09 RX ADMIN — PREDNISOLONE ACETATE 1 DROP: 1.2 SUSPENSION/ DROPS OPHTHALMIC at 22:02

## 2018-06-09 RX ADMIN — TORSEMIDE 10 MG: 10 TABLET ORAL at 08:19

## 2018-06-09 RX ADMIN — CIPROFLOXACIN HYDROCHLORIDE 250 MG: 250 TABLET, FILM COATED ORAL at 09:53

## 2018-06-09 RX ADMIN — INSULIN ASPART 1 UNITS: 100 INJECTION, SOLUTION INTRAVENOUS; SUBCUTANEOUS at 17:39

## 2018-06-09 RX ADMIN — INSULIN ASPART 1 UNITS: 100 INJECTION, SOLUTION INTRAVENOUS; SUBCUTANEOUS at 08:20

## 2018-06-09 RX ADMIN — METOPROLOL TARTRATE 25 MG: 25 TABLET ORAL at 01:40

## 2018-06-09 RX ADMIN — METOPROLOL TARTRATE 25 MG: 25 TABLET ORAL at 08:20

## 2018-06-09 RX ADMIN — FLUTICASONE PROPIONATE 1 SPRAY: 50 SPRAY, METERED NASAL at 08:20

## 2018-06-09 RX ADMIN — INSULIN ASPART 1 UNITS: 100 INJECTION, SOLUTION INTRAVENOUS; SUBCUTANEOUS at 04:40

## 2018-06-09 RX ADMIN — TORSEMIDE 10 MG: 10 TABLET ORAL at 17:31

## 2018-06-09 RX ADMIN — Medication 1 CAPSULE: at 22:02

## 2018-06-09 RX ADMIN — CHLORHEXIDINE GLUCONATE 15 ML: 1.2 RINSE ORAL at 08:20

## 2018-06-09 RX ADMIN — Medication 1 CAPSULE: at 07:50

## 2018-06-09 RX ADMIN — INSULIN ASPART 1 UNITS: 100 INJECTION, SOLUTION INTRAVENOUS; SUBCUTANEOUS at 20:20

## 2018-06-09 RX ADMIN — CIPROFLOXACIN HYDROCHLORIDE 250 MG: 250 TABLET, FILM COATED ORAL at 21:11

## 2018-06-09 RX ADMIN — INSULIN ASPART 1 UNITS: 100 INJECTION, SOLUTION INTRAVENOUS; SUBCUTANEOUS at 11:56

## 2018-06-09 RX ADMIN — WARFARIN SODIUM 3 MG: 3 TABLET ORAL at 17:31

## 2018-06-09 RX ADMIN — METOPROLOL TARTRATE 25 MG: 25 TABLET ORAL at 17:31

## 2018-06-09 RX ADMIN — MIRTAZAPINE 30 MG: 7.5 TABLET ORAL at 22:02

## 2018-06-09 RX ADMIN — IPRATROPIUM BROMIDE AND ALBUTEROL SULFATE 3 ML: .5; 3 SOLUTION RESPIRATORY (INHALATION) at 19:17

## 2018-06-09 RX ADMIN — MELATONIN 6 MG: 3 TAB ORAL at 20:21

## 2018-06-09 RX ADMIN — PREDNISOLONE ACETATE 1 DROP: 1.2 SUSPENSION/ DROPS OPHTHALMIC at 08:20

## 2018-06-09 RX ADMIN — IPRATROPIUM BROMIDE AND ALBUTEROL SULFATE 3 ML: .5; 3 SOLUTION RESPIRATORY (INHALATION) at 08:03

## 2018-06-09 RX ADMIN — RANITIDINE HYDROCHLORIDE 150 MG: 150 SOLUTION ORAL at 08:19

## 2018-06-09 RX ADMIN — INSULIN GLARGINE 10 UNITS: 100 INJECTION, SOLUTION SUBCUTANEOUS at 22:01

## 2018-06-09 RX ADMIN — IPRATROPIUM BROMIDE AND ALBUTEROL SULFATE 3 ML: .5; 3 SOLUTION RESPIRATORY (INHALATION) at 12:31

## 2018-06-09 NOTE — PLAN OF CARE
Problem: Patient Care Overview  Goal: Plan of Care/Patient Progress Review  Outcome: No Change  Pt on full vent support overnight. Alert and intermittently follows commands. Denies pain. Tolerating increase in tube feeds. Slept between cares. Adequate UOP. Pt updated on POC.

## 2018-06-09 NOTE — PROGRESS NOTES
[facility] ICU RESPIRATORY NOTE  Date of Admission: 5/21/2018  Date of Intubation (most recent): Vent dependent  Reason for Mechanical Ventilation: Acute on chronic respiratory failure  Number of Days on Mechanical Ventilation: 20  Met Criteria for Pressure Support Trial: Yes  Length of Pressure Support Trial: PS 10/5 30% > 10 hours  Reason for Stopping Pressure Support Trial: Currently on PS trial    Significant Events Today: None  Ventilation Mode: CPAP/PS  (Continuous positive airway pressure with Pressure Support)  FiO2 (%): 30 %  Rate Set (breaths/minute): 8 breaths/min  Tidal Volume Set (mL): 360 mL  PEEP (cm H2O): 5 cmH2O  Pressure Support (cm H2O): 10 cmH2O  Oxygen Concentration (%): 30 %  Resp: 23    ABG Results: No ABG result for today    ETT appearance on chest x-ray: Trach clean, patent and secure    Plan:  Continue PST as tolerated, and full vent support overnight.

## 2018-06-09 NOTE — PLAN OF CARE
Problem: Patient Care Overview  Goal: Plan of Care/Patient Progress Review  Outcome: No Change  Pt able to pressure support for most of day, pt would get anxious/SOB when transferring to chair or repositioning/turning.  Rt leg/calf wound dressing changed per plan of care.  Morris patent w/ adequate UOP 30-75/hr, catheter care done.  Denies pain, no non-verbal signs.  Home care nurse in room w/ pt today, no family, home care nurse updated.  Plan: d/c Monday??

## 2018-06-09 NOTE — PROGRESS NOTES
[facility]  Comprehensive Daily ICU Note        Jacques Solis MRN# [id number]   Age: 88 year old YOB: 1930     Date of Admission: 5/21/2018    Primary care provider: Michael Mccarthy     CODE STATUS: Currently full. Ethics committee supports change to DNR.       Problem List:   Dementia possibly with superimposed encephalopathy  Acute on chronic respiratory failure, no significant chance of weaning  Afib w complication of arterial embolism  Significant bleed secondary to Warfarin   Anasarca  Recurrent Ileus with inability to tolerate nutrition  PAULY secondary to GI bleed bleed  Diabetes         Treatment goals for next 24 hours:   1. Continue alternating PS and CMV mode.  2. Continue discharge planning. Family hoping to go home early next week with home care, but are open to hearing about hospice. Their expectations for home care may not be able to be met.   3. Tube feeds as able. Re attempt titration to goal  4. Medical management.          Subjective/ Last 24 hours:     Jacques Solis is a 88 year old female who presents with inadequate home support and possible acute on chronic hypoxic respiratory failure. She has essentially been hospitalized since September 2017 when she was admitted to [facility] for critical limb ischemia and underwent balloon angioplasty which was complicated by femoral artery perforation with hemorrhagic shock.  She was trached and pegged on 11/13/17.  She was also noted to have some encephalopathy on a baseline of Alzheimer's dementia. She has essentially moved back and forth from [place] and [facility] and then to [facility] ever since. Her other problems include severe aortic stenosis - not a candidate for TAVR, chronic diastolic heart failure, chronic bilateral recurrent pleural effusions, chronic vent dependence, history of pulmonary embolism, and atrial fibrillation. Her most recent admission to [facility] 4/26-5/4 was due to acute blood loss anemia due to  "left thigh hematoma in addition to renal failure due to tobramycin toxicity for multi-drug resistant Pseudomonas.  She also underwent debridement of a right lower extremity wound due to an infected hematoma.  She was ultimately discharged to [facility], where she stayed until discharge home on 5/18/18.  Pulmonology felt that she would not be able to wean and recommended discharge home or to TCU for chronic vented patients, and son elected for discharge home.      Son felt he was not sent home with adequate support or resources to manage patient at home and was also concerned that she was having a respiratory decompensation. Visited her [facility] based PCP on 5/22 and requested admission to [facility].      On admission here, son reported his goals are for his mother to be able to wean from the vent for at least a few hours and be able to speak with valve and possible eat some food by mouth.  He understands that she may never be able to fully wean from the vent and is accepting of this, however, he wants any reversible conditions treated. Both son and daughter say they were hoping a \"fresh set of eyes\" might come up with different answers for their mother but both agree that nothing much is different.    Review of chart from [facility] reveals that ethics consultation was requested and that goals of care conversations were held. Per notes, no dialysis was recommended and \"partial code\" was also recommended.  Currently listed as Full Code here.  Ethics committee at [facility] concurred with decisions made by [facility] staff (DNR however family yet to be updated). .     Since admission, no significant changes have occurred other than recurrent bleed into thigh (related to Coumadin) and recurrent ileus. Care conference held on 6/7, see separate note. Discharge planning ongoing.            Mechanical Ventilation/Vitalsigns/IsandOs:   Temp:  [97.7  F (36.5  C)-98.8  F (37.1  C)] 98.8  F (37.1  C)  Heart Rate:  [68-90] " 90  Resp:  [11-31] 25  BP: (100-152)/(47-88) 100/66  FiO2 (%):  [30 %] 30 %  SpO2:  [87 %-99 %] 96 %      Intake/Output Summary (Last 24 hours) at 06/09/18 1034  Last data filed at 06/09/18 0800   Gross per 24 hour   Intake             1025 ml   Output              930 ml   Net               95 ml     +6.3 liters    Ventilation Mode: CPAP/PS  (Continuous positive airway pressure with Pressure Support)  FiO2 (%): 30 %  Rate Set (breaths/minute): 8 breaths/min  Tidal Volume Set (mL): 360 mL  PEEP (cm H2O): 5 cmH2O  Pressure Support (cm H2O): 10 cmH2O  Oxygen Concentration (%): 30 %  Resp: 25             Physical Examination:     General: Stated age, NAD  HEENT: tracheostomy, atraumatic  Lungs: LCTAB anteriorly  CVS: RRR  Abdomen: +BS, soft and non tender  Extremities/musculoskeletal: warm, edematous, right lower leg wound bandaged  Neurology: asleep  Skin: normal  Psychiatry: calm  Exam of Line sites:  PICC looks OK            Feeding/Glucose:   Tube feeds    Blood glucose/Insulin requirement last 24 hours: sliding scale and long acting (decreased dose)         Medications:       chlorhexidine  15 mL Swish & Spit BID     ciprofloxacin  250 mg Oral BID     fluticasone  1 spray Both Nostrils Daily     insulin aspart  1-6 Units Subcutaneous Q4H     insulin glargine  10 Units Subcutaneous At Bedtime     ipratropium - albuterol 0.5 mg/2.5 mg/3 mL  1 vial Nebulization TID     lactobacillus rhamnosus (GG)  1 capsule Oral or Feeding Tube BID     melatonin tablet 6 mg  6 mg Per G Tube Q24H     metoprolol tartrate (LOPRESSOR) tablet 25 mg  25 mg Oral or Feeding Tube Q8H     mirtazapine (REMERON) tablet TABS 30 mg  30 mg Oral or Feeding Tube At Bedtime     prednisoLONE acetate  1 drop Both Eyes BID     rantidine  150 mg Oral Daily     sodium chloride (PF)  10 mL Intracatheter Q7 Days     torsemide (DEMADEX) tablet 10 mg  10 mg Oral or Feeding Tube BID     warfarin  3 mg Oral ONCE at 18:00        - MEDICATION INSTRUCTIONS -        Warfarin Therapy Reminder                  Labs/Diagnostic studies:     EKG:  sinus    Echo:  No recent    ROUTINE ICU LABS (Last four results)  CMP    Recent Labs  Lab 06/09/18  0450 06/08/18  0431 06/07/18  0530 06/06/18  0400  06/03/18  0420    138 139 139  < > 138   POTASSIUM 3.6 3.6 3.5 3.7  < > 4.2   CHLORIDE 95 96 96 98  < > 98   CO2 35* 35* 36* 34*  < > 32   ANIONGAP 9 7 7 7  < > 8   * 92 128* 151*  < > 148*   BUN 57* 58* 63* 64*  < > 77*   CR 1.31* 1.25* 1.20* 1.15*  < > 1.50*   GFRESTIMATED 38* 40* 42* 45*  < > 33*   GFRESTBLACK 46* 49* 51* 54*  < > 40*   KRISTOFER 8.4* 8.2* 8.1* 8.2*  < > 8.2*   PHOS  --   --   --   --   --  3.3   < > = values in this interval not displayed.  CBC    Recent Labs  Lab 06/09/18 0450 06/08/18 0431 06/07/18  0530 06/06/18  0400   WBC 10.4 11.0 10.6 12.0*   RBC 3.32* 3.26* 3.19* 3.05*   HGB 9.5* 9.3* 9.1* 8.9*   HCT 30.8* 30.9* 29.4* 28.0*   MCV 93 95 92 92   MCH 28.6 28.5 28.5 29.2   MCHC 30.8* 30.1* 31.0* 31.8   RDW 18.0* 18.5* 18.0* 18.1*    324 278 245     INR    Recent Labs  Lab 06/09/18  0450 06/08/18 0431 06/07/18  0530 06/06/18  0400   INR 2.16* 1.65* 1.56* 1.70*     Arterial Blood GasNo lab results found in last 7 days.    Cultures:  No results for input(s): CULT in the last 168 hours.  Blood culture:  Invalid input(s): BC   Urine culture:  No results for input(s): URC in the last 168 hours.            Imaging:     CXR:  On 6/3 kyphosis and bilateral lower lobe opacities.     CT:  No recent         Assessment and Plan:     Summary:  Jacques Solis is a 88 year old female with past history A-fib, CHF, severe AS, DM II, asthma, chronic respiratory failure with vent dependence who was discharged home from [facility] on 5/18/18 but returned to clinic on 5/22 and requested hospitalization because of inability to manage respiratory status at home.    Neurology and Psychiatry:  1. Dementia with superimposed encephalopathy:  Per [facility] chart her baseline dementia  prior to this prolonged illness was being oriented to self and able to answer basic questions - dementia was significant enough that it disqualified her from receiving TAVR. Was on Nemenda last year prior to hospitalization.  Valproic acid started this hospitalization for agitation. Son felt it made her too sleepy so it was discontinued.  - Continue PTA Seroquel PRN   - Continue HS Mirtazipine and Melatonin  - Monitor for signs of agitation/pain and treat as necessary    Pulmonary:   1. Acute on chronic hypoxic respiratory failure with vent dependence:  Chronic failure felt due to pleural effusions and overall debility. Pulmonologist at [facility] felt she would chronically be vent dependent and weaning efforts were stopped, which is affirmed on this admission and her course thus far.  CT chest in ED showed small to moderated loculated bilateral pleural effusions with atelectasis (chronic)   Also a 4 cm gil opacity in left upper lobe, but no SIRS criteria present.  Tolerating some time on pressure support and some time on passy wilner.  - Continue pressure support trials and passy wilner as tolerated  - Working to arrange discharge to home. Have settled on [facility], who will be able to manage the ventilator but can't take patient until Monday. Patient may be able to find her own PCP separately from this. Son is still focused on finding a specific doctor to manage ventilator.  I told him this does not appear to be how the system works and delaying discharge for this search in not appropriate - can be done while at home.   2. Reactive airways disease  - Continue scheduled bronchodilators.    Cardiovascular system:   1. Paroxysmal A-fib: Also related to lower extremity arterial clot.   - continue PTA metoprolol (note son has been dosing this at 25 mg q8h) - required intermittent IV dose 5/30  - have resumed anticoagulation at family request. Had a significant bleed on Warfarin so this is high risk but son requesting  it be resumed.  INR still subtherapeutic.   2. Severe AS and chronic diastolic CHF: TTE 3/2018 with EF 55-60%, severe AS (area of 0.6-0.7) and mild regurgitation, moderate MR. Not a surgical or TAVR candidate due to advanced dementia  - continue torsemide at home BID dosing 10 mg. Aim for net even.    Renal/Electrolytes:  1. Renal - Non oliguric PAULY - Cr rise times with acute bleed with hypotension:  Morris removed per family request but replaced 6/1 given ongoing skin breakdown and difficulty monitoring UOP.  Most recent culture with sensitive Klebsiella and Enterococcus, thus cipro started. Creatinine variable but stable and near baseline   - follow Cr, UOP, maintain hemodynamics. Continue outpatient diuretics. Aim for net even fluid balance.  - Completing a course of Ciprofloxacin.    ID:  UTI as above.    GI//Nutrition:  Not safe to swallow. On chronic tube feeds. Intermittently unable to tolerate tube feeds because of ileus.  This has again become an issue today, 6/7.  - Nutrition as possible.    Musculoskeletal/Rheumatology:  Non healing wound. Wound cares per unit routine. Children requesting that Medicare pay for air mattress, but per review it appears she does not qualify for this because her wound is not on her trunk, and because it was from a surgery not a medical issue. Ongoing discussion with .     Endocrine:   DM. Sugars controlled on long acting and sliding scale insulin.  Sugars bit labile in setting of being on and off tube feeds requiring intermittent holds.  Lantus dose decreased   - goal sugar <180   - continue SS    Heme/Onc:  1. Recent acute blood loss anemia with thigh hematomas (Confirmed on CT 5/31): Stable  -  INR mildly elevated at 4 but prior reportedly due to supratherapeutic lovenox in setting of PAULY.  Hemoglobin now stable and Warfarin restarted. INR rising.   - Serial Hemoglobins and INRs.  Attempting to get home INR machine for son. Will be difficult to get a consistent  INR because of her inability to consistently tolerate nutrition as well       ICU prophylaxis:      DVT: Warfarin     VAP: As above     Stress ulcer: Ranitidine     Restraints needed: no     Lines   Central Line/PICC - placed unknown needed: y   Arterial line - placed n needed: n   Morris catheter.  Needed: y       Prognosis:  Poor      Family update: long care conference evening of 6/7.  Family wants [facility] home services for vent - not an available option.     Billing: total time spend providing critical care was 30 min, excluding procedure time.

## 2018-06-10 LAB
ANION GAP SERPL CALCULATED.3IONS-SCNC: 6 MMOL/L (ref 3–14)
BUN SERPL-MCNC: 52 MG/DL (ref 7–30)
CALCIUM SERPL-MCNC: 8.1 MG/DL (ref 8.5–10.1)
CHLORIDE SERPL-SCNC: 97 MMOL/L (ref 94–109)
CO2 SERPL-SCNC: 36 MMOL/L (ref 20–32)
CREAT SERPL-MCNC: 1.25 MG/DL (ref 0.52–1.04)
ERYTHROCYTE [DISTWIDTH] IN BLOOD BY AUTOMATED COUNT: 18.1 % (ref 10–15)
GFR SERPL CREATININE-BSD FRML MDRD: 40 ML/MIN/1.7M2
GLUCOSE BLDC GLUCOMTR-MCNC: 144 MG/DL (ref 70–99)
GLUCOSE BLDC GLUCOMTR-MCNC: 168 MG/DL (ref 70–99)
GLUCOSE BLDC GLUCOMTR-MCNC: 169 MG/DL (ref 70–99)
GLUCOSE BLDC GLUCOMTR-MCNC: 174 MG/DL (ref 70–99)
GLUCOSE BLDC GLUCOMTR-MCNC: 199 MG/DL (ref 70–99)
GLUCOSE BLDC GLUCOMTR-MCNC: 202 MG/DL (ref 70–99)
GLUCOSE SERPL-MCNC: 170 MG/DL (ref 70–99)
HCT VFR BLD AUTO: 28.9 % (ref 35–47)
HGB BLD-MCNC: 9.1 G/DL (ref 11.7–15.7)
INR PPP: 2.26 (ref 0.86–1.14)
MCH RBC QN AUTO: 29.2 PG (ref 26.5–33)
MCHC RBC AUTO-ENTMCNC: 31.5 G/DL (ref 31.5–36.5)
MCV RBC AUTO: 93 FL (ref 78–100)
PLATELET # BLD AUTO: 329 10E9/L (ref 150–450)
POTASSIUM SERPL-SCNC: 3.5 MMOL/L (ref 3.4–5.3)
RBC # BLD AUTO: 3.12 10E12/L (ref 3.8–5.2)
SODIUM SERPL-SCNC: 139 MMOL/L (ref 133–144)
WBC # BLD AUTO: 10.6 10E9/L (ref 4–11)

## 2018-06-10 PROCEDURE — 94003 VENT MGMT INPAT SUBQ DAY: CPT

## 2018-06-10 PROCEDURE — 99291 CRITICAL CARE FIRST HOUR: CPT | Performed by: INTERNAL MEDICINE

## 2018-06-10 PROCEDURE — 00000146 ZZHCL STATISTIC GLUCOSE BY METER IP

## 2018-06-10 PROCEDURE — A9270 NON-COVERED ITEM OR SERVICE: HCPCS | Mod: GY | Performed by: INTERNAL MEDICINE

## 2018-06-10 PROCEDURE — 40000239 ZZH STATISTIC VAT ROUNDS

## 2018-06-10 PROCEDURE — 25000131 ZZH RX MED GY IP 250 OP 636 PS 637: Mod: GY | Performed by: HOSPITALIST

## 2018-06-10 PROCEDURE — 40000008 ZZH STATISTIC AIRWAY CARE

## 2018-06-10 PROCEDURE — 80048 BASIC METABOLIC PNL TOTAL CA: CPT | Performed by: HOSPITALIST

## 2018-06-10 PROCEDURE — 94640 AIRWAY INHALATION TREATMENT: CPT

## 2018-06-10 PROCEDURE — 25000132 ZZH RX MED GY IP 250 OP 250 PS 637: Mod: GY | Performed by: INTERNAL MEDICINE

## 2018-06-10 PROCEDURE — 40000275 ZZH STATISTIC RCP TIME EA 10 MIN

## 2018-06-10 PROCEDURE — A9270 NON-COVERED ITEM OR SERVICE: HCPCS | Mod: GY | Performed by: HOSPITALIST

## 2018-06-10 PROCEDURE — 25000132 ZZH RX MED GY IP 250 OP 250 PS 637: Mod: GY | Performed by: NURSE PRACTITIONER

## 2018-06-10 PROCEDURE — 85610 PROTHROMBIN TIME: CPT | Performed by: HOSPITALIST

## 2018-06-10 PROCEDURE — 94640 AIRWAY INHALATION TREATMENT: CPT | Mod: 76

## 2018-06-10 PROCEDURE — 27210432 ZZH NUTRITION PRODUCT RENAL BASIC LITER

## 2018-06-10 PROCEDURE — 25000132 ZZH RX MED GY IP 250 OP 250 PS 637: Mod: GY | Performed by: HOSPITALIST

## 2018-06-10 PROCEDURE — A9270 NON-COVERED ITEM OR SERVICE: HCPCS | Mod: GY | Performed by: NURSE PRACTITIONER

## 2018-06-10 PROCEDURE — 25000131 ZZH RX MED GY IP 250 OP 636 PS 637: Mod: GY | Performed by: INTERNAL MEDICINE

## 2018-06-10 PROCEDURE — 20000003 ZZH R&B ICU

## 2018-06-10 PROCEDURE — 25000125 ZZHC RX 250: Performed by: INTERNAL MEDICINE

## 2018-06-10 PROCEDURE — 85027 COMPLETE CBC AUTOMATED: CPT | Performed by: HOSPITALIST

## 2018-06-10 RX ORDER — WARFARIN SODIUM 4 MG/1
4 TABLET ORAL
Status: COMPLETED | OUTPATIENT
Start: 2018-06-10 | End: 2018-06-10

## 2018-06-10 RX ORDER — POTASSIUM CHLORIDE 20MEQ/15ML
20 LIQUID (ML) ORAL DAILY
Status: DISCONTINUED | OUTPATIENT
Start: 2018-06-10 | End: 2018-06-14 | Stop reason: HOSPADM

## 2018-06-10 RX ADMIN — CHLORHEXIDINE GLUCONATE 15 ML: 1.2 RINSE ORAL at 20:04

## 2018-06-10 RX ADMIN — RANITIDINE HYDROCHLORIDE 150 MG: 150 SOLUTION ORAL at 08:12

## 2018-06-10 RX ADMIN — MELATONIN 6 MG: 3 TAB ORAL at 19:57

## 2018-06-10 RX ADMIN — INSULIN ASPART 2 UNITS: 100 INJECTION, SOLUTION INTRAVENOUS; SUBCUTANEOUS at 20:00

## 2018-06-10 RX ADMIN — INSULIN ASPART 2 UNITS: 100 INJECTION, SOLUTION INTRAVENOUS; SUBCUTANEOUS at 16:34

## 2018-06-10 RX ADMIN — Medication 12.5 MG: at 01:53

## 2018-06-10 RX ADMIN — INSULIN ASPART 1 UNITS: 100 INJECTION, SOLUTION INTRAVENOUS; SUBCUTANEOUS at 00:19

## 2018-06-10 RX ADMIN — PREDNISOLONE ACETATE 1 DROP: 1.2 SUSPENSION/ DROPS OPHTHALMIC at 20:04

## 2018-06-10 RX ADMIN — MIRTAZAPINE 30 MG: 7.5 TABLET ORAL at 22:03

## 2018-06-10 RX ADMIN — INSULIN ASPART 1 UNITS: 100 INJECTION, SOLUTION INTRAVENOUS; SUBCUTANEOUS at 08:22

## 2018-06-10 RX ADMIN — TORSEMIDE 10 MG: 10 TABLET ORAL at 16:27

## 2018-06-10 RX ADMIN — IPRATROPIUM BROMIDE AND ALBUTEROL SULFATE 3 ML: .5; 3 SOLUTION RESPIRATORY (INHALATION) at 19:42

## 2018-06-10 RX ADMIN — POTASSIUM CHLORIDE 20 MEQ: 20 SOLUTION ORAL at 14:26

## 2018-06-10 RX ADMIN — Medication 12.5 MG: at 12:25

## 2018-06-10 RX ADMIN — TORSEMIDE 10 MG: 10 TABLET ORAL at 08:12

## 2018-06-10 RX ADMIN — IPRATROPIUM BROMIDE AND ALBUTEROL SULFATE 3 ML: .5; 3 SOLUTION RESPIRATORY (INHALATION) at 13:53

## 2018-06-10 RX ADMIN — INSULIN GLARGINE 10 UNITS: 100 INJECTION, SOLUTION SUBCUTANEOUS at 22:03

## 2018-06-10 RX ADMIN — ACETAMINOPHEN 650 MG: 160 SUSPENSION ORAL at 19:32

## 2018-06-10 RX ADMIN — CHLORHEXIDINE GLUCONATE 15 ML: 1.2 RINSE ORAL at 08:12

## 2018-06-10 RX ADMIN — INSULIN ASPART 1 UNITS: 100 INJECTION, SOLUTION INTRAVENOUS; SUBCUTANEOUS at 12:09

## 2018-06-10 RX ADMIN — FLUTICASONE PROPIONATE 1 SPRAY: 50 SPRAY, METERED NASAL at 08:12

## 2018-06-10 RX ADMIN — INSULIN ASPART 1 UNITS: 100 INJECTION, SOLUTION INTRAVENOUS; SUBCUTANEOUS at 04:44

## 2018-06-10 RX ADMIN — PREDNISOLONE ACETATE 1 DROP: 1.2 SUSPENSION/ DROPS OPHTHALMIC at 08:12

## 2018-06-10 RX ADMIN — Medication 1 CAPSULE: at 20:03

## 2018-06-10 RX ADMIN — Medication 1 CAPSULE: at 08:12

## 2018-06-10 RX ADMIN — METOPROLOL TARTRATE 25 MG: 25 TABLET ORAL at 08:12

## 2018-06-10 RX ADMIN — WARFARIN SODIUM 4 MG: 4 TABLET ORAL at 18:30

## 2018-06-10 RX ADMIN — IPRATROPIUM BROMIDE AND ALBUTEROL SULFATE 3 ML: .5; 3 SOLUTION RESPIRATORY (INHALATION) at 08:31

## 2018-06-10 RX ADMIN — METOPROLOL TARTRATE 25 MG: 25 TABLET ORAL at 16:27

## 2018-06-10 NOTE — PLAN OF CARE
Problem: Patient Care Overview  Goal: Plan of Care/Patient Progress Review  Outcome: No Change  Pt on full vent support overnight. Small to moderate amount of secretions from ETT. SR with intermittent A fib. Alert and intermittently follows commands. Denies pain. Tolerating increase in tube feeds. Slept between cares. Adequate UOP. Pt updated on POC.

## 2018-06-10 NOTE — PROGRESS NOTES
[facility]  Comprehensive Daily ICU Note        Jacques Solis MRN# [id number]   Age: 88 year old YOB: 1930     Date of Admission: 5/21/2018    Primary care provider: Michael Mccarthy     CODE STATUS: Currently full. Ethics committee supports change to DNR.       Problem List:   Dementia possibly with superimposed encephalopathy  Acute on chronic respiratory failure, no significant chance of weaning  Afib w complication of arterial embolism  Significant bleed secondary to Warfarin   Anasarca  Recurrent Ileus with inability to tolerate nutrition  PAULY secondary to GI bleed bleed  Diabetes         Treatment goals for next 24 hours:   1. Continue vent support - PS and CMV mode.  2. Continue discharge planning. Family discussion  CODE changed to DNR  3. Medical Management        Subjective/ Last 24 hours:     Jacques Solis is a 88 year old female who presents with inadequate home support and possible acute on chronic hypoxic respiratory failure. She has essentially been hospitalized since September 2017 when she was admitted to [facility] for critical limb ischemia and underwent balloon angioplasty which was complicated by femoral artery perforation with hemorrhagic shock.  She was trached and pegged on 11/13/17.  She was also noted to have some encephalopathy on a baseline of Alzheimer's dementia. She has essentially moved back and forth from [place] and [facility] and then to [facility] and [facility] ever since. Her other problems include severe aortic stenosis - not a candidate for TAVR, chronic diastolic heart failure, chronic bilateral recurrent pleural effusions, chronic vent dependence, history of pulmonary embolism, and atrial fibrillation. Her most recent admission to [facility] 4/26-5/4 was due to acute blood loss anemia due to left thigh hematoma in addition to renal failure due to tobramycin toxicity for multi-drug resistant Pseudomonas.  She also underwent debridement of a right lower  "extremity wound due to an infected hematoma.  She was ultimately discharged to [facility], where she stayed until discharge home on 5/18/18.  Pulmonology felt that she would not be able to wean and recommended discharge home or to TCU for chronic vented patients, and son elected for discharge home.      Son felt he was not sent home with adequate support or resources to manage patient at home and was also concerned that she was having a respiratory decompensation. Visited her [facility] based PCP on 5/22 and requested admission to [facility].      On admission here, son reported his goals are for his mother to be able to wean from the vent for at least a few hours and be able to speak with valve and possible eat some food by mouth.  He understands that she may never be able to fully wean from the vent and is accepting of this, however, he wants any reversible conditions treated. Both son and daughter say they were hoping a \"fresh set of eyes\" might come up with different answers for their mother but both agree that nothing much is different.    Review of chart from [facility] reveals that ethics consultation was requested and that goals of care conversations were held. Per notes, no dialysis was recommended and \"partial code\" was also recommended.  Currently listed as Full Code here.  Ethics committee at [facility] concurred with decisions made by [facility] staff (DNR however family yet to be updated). .     Since admission, no significant changes have occurred other than recurrent bleed into thigh (related to Coumadin) and recurrent ileus. Care conference held on 6/7, see separate note. Discharge planning ongoing.     INTERIM HISTORY   6/9 - 6/10  -  becoming unstable for turns per nursing - becomes hypoxemic,increased WOB,  FIO2 slowly increasing ,   - some elevated peak pressures on CMV - on off PS (not trials )             Mechanical Ventilation/Vitalsigns/IsandOs:   Temp:  [97.7  F (36.5  C)-99  F (37.2  C)] 97.7 "  F (36.5  C)  Heart Rate:  [72-90] 80  Resp:  [8-25] 14  BP: ()/(47-96) 81/53  FiO2 (%):  [30 %] 30 %  SpO2:  [91 %-97 %] 95 %      Intake/Output Summary (Last 24 hours) at 06/10/18 0701  Last data filed at 06/10/18 0600   Gross per 24 hour   Intake             1155 ml   Output             1235 ml   Net              -80 ml       Ventilation Mode: CMV/AC  (Continuous Mandatory Ventilation/ Assist Control)  FiO2 (%): 30 %  Rate Set (breaths/minute): 8 breaths/min  Tidal Volume Set (mL): 360 mL  PEEP (cm H2O): 5 cmH2O  Pressure Support (cm H2O): 10 cmH2O  Oxygen Concentration (%): 30 %  Resp: 14             Physical Examination:     General: Stated age, NAD  HEENT: tracheostomy, atraumatic  Lungs: LCTAB anteriorly  CVS: RRR  Abdomen: +BS, soft and non tender  Extremities/musculoskeletal: warm, edematous, right lower leg wound bandaged  Neurology: asleep  Skin: normal  Psychiatry: calm  Exam of Line sites:  PICC looks OK            Feeding/Glucose:   Tube feeds    Blood glucose/Insulin requirement last 24 hours: sliding scale and long acting (decreased dose)         Medications:       chlorhexidine  15 mL Swish & Spit BID     fluticasone  1 spray Both Nostrils Daily     insulin aspart  1-6 Units Subcutaneous Q4H     insulin glargine  10 Units Subcutaneous At Bedtime     ipratropium - albuterol 0.5 mg/2.5 mg/3 mL  1 vial Nebulization TID     lactobacillus rhamnosus (GG)  1 capsule Oral or Feeding Tube BID     melatonin tablet 6 mg  6 mg Per G Tube Q24H     metoprolol tartrate (LOPRESSOR) tablet 25 mg  25 mg Oral or Feeding Tube Q8H     mirtazapine (REMERON) tablet TABS 30 mg  30 mg Oral or Feeding Tube At Bedtime     prednisoLONE acetate  1 drop Both Eyes BID     rantidine  150 mg Oral Daily     sodium chloride (PF)  10 mL Intracatheter Q7 Days     torsemide (DEMADEX) tablet 10 mg  10 mg Oral or Feeding Tube BID        - MEDICATION INSTRUCTIONS -       Warfarin Therapy Reminder                  Labs/Diagnostic  studies:     EKG:  sinus    Echo:  No recent    ROUTINE ICU LABS (Last four results)  CMP    Recent Labs  Lab 06/10/18  0440 06/09/18  0450 06/08/18  0431 06/07/18  0530    139 138 139   POTASSIUM 3.5 3.6 3.6 3.5   CHLORIDE 97 95 96 96   CO2 36* 35* 35* 36*   ANIONGAP 6 9 7 7   * 168* 92 128*   BUN 52* 57* 58* 63*   CR 1.25* 1.31* 1.25* 1.20*   GFRESTIMATED 40* 38* 40* 42*   GFRESTBLACK 49* 46* 49* 51*   KRISTOFER 8.1* 8.4* 8.2* 8.1*     CBC    Recent Labs  Lab 06/10/18  0440 06/09/18  0450 06/08/18  0431 06/07/18  0530   WBC 10.6 10.4 11.0 10.6   RBC 3.12* 3.32* 3.26* 3.19*   HGB 9.1* 9.5* 9.3* 9.1*   HCT 28.9* 30.8* 30.9* 29.4*   MCV 93 93 95 92   MCH 29.2 28.6 28.5 28.5   MCHC 31.5 30.8* 30.1* 31.0*   RDW 18.1* 18.0* 18.5* 18.0*    327 324 278     INR    Recent Labs  Lab 06/10/18  0440 06/09/18  0450 06/08/18  0431 06/07/18  0530   INR 2.26* 2.16* 1.65* 1.56*     Arterial Blood GasNo lab results found in last 7 days.    Cultures:  No results for input(s): CULT in the last 168 hours.  Blood culture:  Invalid input(s): BC   Urine culture:  No results for input(s): URC in the last 168 hours.            Imaging:     No results found for this or any previous visit (from the past 24 hour(s)).           Assessment and Plan:     Summary:  Jacques Solis is a 88 year old female with past history A-fib, CHF, severe AS, DM II, asthma, chronic respiratory failure with vent dependence who was discharged home from [facility] on 5/18/18 but returned to clinic on 5/22 and requested hospitalization because of inability to manage respiratory status at home.    Neurology and Psychiatry:  1. Dementia with superimposed encephalopathy:  Per [facility] chart her baseline dementia prior to this prolonged illness was being oriented to self and able to answer basic questions - dementia was significant enough that it disqualified her from receiving TAVR. Was on Nemenda last year prior to hospitalization.  Valproic acid started this  hospitalization for agitation. Son felt it made her too sleepy so it was discontinued.  Plan  - Continue PTA Seroquel PRN for agitation/anixety  - Continue HS Mirtazipine and Melatonin  - Monitor for signs of agitation/pain and treat as necessary     Pulmonary:   1. Acute on chronic hypoxic respiratory failure with vent dependence:  Chronic failure felt due to pleural effusions and overall debility. Pulmonologist at [facility] felt she would chronically be vent dependent and weaning efforts were stopped, which is affirmed on this admission and her course thus far.    - CT chest in ED showed small to moderated loculated bilateral pleural effusions with atelectasis (chronic)   Also a 4 cm gil opacity in left upper lobe, but no SIRS criteria present. Tolerating some time on pressure support and some time on passy wilner and continued as tolerated but tenuous in terms of moves with desaturations 6/10   - Working to arrange discharge to home. Have settled on [employer], who will be able to manage the ventilator but can't take patient until Monday. Family inquiring about home care services and possible home visit for vent management - something currently not available    2. Reactive airways disease  Plan  - Continue scheduled bronchodilators.  - CMV vs PS for patient comfort - not going to be liberated   - will need trach exchanged prior to discharge (7 bivona)    Cardiovascular system:   1. Paroxysmal A-fib: Also related to lower extremity arterial clot.   2. Severe AS and chronic diastolic CHF: TTE 3/2018 with EF 55-60%, severe AS (area of 0.6-0.7) and mild regurgitation, moderate MR. Not a surgical or TAVR candidate due to advanced dementia  Plan  - continue torsemide at home BID dosing 10 mg. Aim for net even.  - resumed scheduled KCL given diuretic 20 me daily  -  have resumed anticoagulation at family request. Had a significant bleed on Warfarin so this is high risk but son requesting it be resumed.  INR  therapeutic. 2-3   - continue PTA metoprolol (note son has been dosing this at 25 mg q8h) - required intermittent IV dose 5/30      Renal/Electrolytes:  1. Renal - Non oliguric PAULY - resolved - Cr rise times with acute bleed with hypotension:  Huggins removed per family request but replaced 6/1 given ongoing skin breakdown and difficulty monitoring UOP.   2. UTI - most recent culture with sensitive Klebsiella and Enterococcus, thus cipro started. Creatinine variable but stable and near baseline   Plan   - follow Cr, UOP, maintain hemodynamics. Continue outpatient diuretics. Aim for net even fluid balance.  - follow with huggins (placed for comfort measures and mitigate skin breakdown)      ID:  1. Kleb and enterococcus UTI as above.  Plan  - Completed a 7 day course of Ciprofloxacin.    GI//Nutrition:  1. Aspiration Risk - Not safe to swallow. On chronic tube feeds. Intermittently unable to tolerate 2/2 ileus    2. Intermittent ileus.  This has again become an issue today, 6/7.Improving last BM 6/10   Plan  - continue tube feeds as tolerated    Musculoskeletal/Rheumatology:  1. Non healing wound. 2/2 debiliation , bed ridden  Plan   - Wound cares per unit routine. Children requesting that Medicare pay for air mattress, but per review it appears she does not qualify for this because her wound is not on her trunk, and because it was from a surgery not a medical issue. Ongoing discussion with .     Endocrine:   1. DM. Sugars controlled on long acting and sliding scale insulin.  Sugars bit labile in setting of being on and off tube feeds requiring intermittent holds.  Lantus dose decreased   Plan  - goal sugar <180   - continue SS    Heme/Onc:  1. Recent acute blood loss anemia with thigh hematomas in context of elevated INR. Prior bleed also on AC (lovenox)(Confirmed on CT 5/31): Hgb Stable  2. Coagulopathy in setting of variable AC and nutritional status  Plan   - Serial Hemoglobins and INRs.  Attempting to  get home INR machine for son. Will be difficult to get a consistent INR because of her inability to consistently tolerate nutrition as well   - ongoing discussion of risks benefits of AC      ICU prophylaxis:      DVT: Warfarin     VAP: As above     Stress ulcer: Ranitidine     Restraints needed: no     Lines   Central Line/PICC - placed unknown needed: y   Arterial line - placed n needed: n   Morris catheter.  Needed: y       Prognosis:  Poor      Family update: long care conference evening of 6/7.  Again 6/10 Updated status and tenous nature and that she is dying. Family prefer her to be at home and ok is he dies at home but aware might not be an option. They mentioned possible 10 juli vent home in [facility].   Care coordinator working on dispo. Did update them of Ethics consultation and agreement that DNR status appropriate and that code status changed in chart.  Advised would not escalate care further and they agree not to pursue heroic measure. Expressed interest in hospice but advised likely not candidate as on vent.     Billing: total time spend providing critical care was 75 min, excluding procedure time.

## 2018-06-10 NOTE — PROGRESS NOTES
[facility] ICU RESPIRATORY NOTE  Date of Admission: 5/21/2018  Date of Intubation (most recent): Vent dependent  Reason for Mechanical Ventilation: Acute on chronic respiratory failure  Number of Days on Mechanical Ventilation: 21  Met Criteria for Pressure Support Trial: Yes  Length of Pressure Support Trial: PS 10/5 40% 20 minutes  Reason for Stopping Pressure Support Trial: Tachypnea and oxygen desaturation     Significant Events Today: FiO2 increased to 40%  Ventilation Mode: CMV/AC  (Continuous Mandatory Ventilation/ Assist Control)  FiO2 (%): 40 %  Rate Set (breaths/minute): 8 breaths/min  Tidal Volume Set (mL): 360 mL  PEEP (cm H2O): 5 cmH2O  Pressure Support (cm H2O): 10 cmH2O  Oxygen Concentration (%): 40 %  Resp: 20    ABG Results: No ABG result for today    ETT appearance on chest x-ray: Trach clean, patent and secure    Plan:  Continue full mechanical ventilatory support.

## 2018-06-10 NOTE — PROGRESS NOTES
Care Coordination:    Was called by ICU staff to come met with family, son and daughter. They are looking into possible placement in a Vent home. Son, Yariel had a family member on phone, [employer] that her father was at [facility] Vent home. LEONELA gave writer current phone number of Admission's RN Vandana. Placed call to Vandana, she says there is a bed available, she would like to review chart first. Faxed H&P, facesheet and recent notes. Gave Vandana my phone number. She will review and call me back.    Update: Vandana called back and will come by tomorrow afternoon for an assessment.    Judie Lloyd RN BSN  [facility] Care Coordinator  Phone [phone]

## 2018-06-11 ENCOUNTER — APPOINTMENT (OUTPATIENT)
Dept: SPEECH THERAPY | Facility: CLINIC | Age: 83
DRG: 207 | End: 2018-06-11
Payer: MEDICARE

## 2018-06-11 LAB
ALBUMIN SERPL-MCNC: 1.9 G/DL (ref 3.4–5)
ALP SERPL-CCNC: 118 U/L (ref 40–150)
ALT SERPL W P-5'-P-CCNC: 21 U/L (ref 0–50)
ANION GAP SERPL CALCULATED.3IONS-SCNC: 5 MMOL/L (ref 3–14)
AST SERPL W P-5'-P-CCNC: 29 U/L (ref 0–45)
BACTERIA SPEC CULT: ABNORMAL
BASOPHILS # BLD AUTO: 0 10E9/L (ref 0–0.2)
BASOPHILS NFR BLD AUTO: 0.3 %
BILIRUB SERPL-MCNC: 0.8 MG/DL (ref 0.2–1.3)
BUN SERPL-MCNC: 50 MG/DL (ref 7–30)
CALCIUM SERPL-MCNC: 8.4 MG/DL (ref 8.5–10.1)
CHLORIDE SERPL-SCNC: 96 MMOL/L (ref 94–109)
CO2 SERPL-SCNC: 38 MMOL/L (ref 20–32)
CREAT SERPL-MCNC: 1.22 MG/DL (ref 0.52–1.04)
DIFFERENTIAL METHOD BLD: ABNORMAL
EOSINOPHIL # BLD AUTO: 0.2 10E9/L (ref 0–0.7)
EOSINOPHIL NFR BLD AUTO: 1.6 %
ERYTHROCYTE [DISTWIDTH] IN BLOOD BY AUTOMATED COUNT: 18 % (ref 10–15)
GFR SERPL CREATININE-BSD FRML MDRD: 42 ML/MIN/1.7M2
GLUCOSE BLDC GLUCOMTR-MCNC: 149 MG/DL (ref 70–99)
GLUCOSE BLDC GLUCOMTR-MCNC: 184 MG/DL (ref 70–99)
GLUCOSE BLDC GLUCOMTR-MCNC: 214 MG/DL (ref 70–99)
GLUCOSE BLDC GLUCOMTR-MCNC: 227 MG/DL (ref 70–99)
GLUCOSE BLDC GLUCOMTR-MCNC: 239 MG/DL (ref 70–99)
GLUCOSE BLDC GLUCOMTR-MCNC: 243 MG/DL (ref 70–99)
GLUCOSE SERPL-MCNC: 149 MG/DL (ref 70–99)
HCT VFR BLD AUTO: 30.1 % (ref 35–47)
HGB BLD-MCNC: 9.4 G/DL (ref 11.7–15.7)
IMM GRANULOCYTES # BLD: 0.1 10E9/L (ref 0–0.4)
IMM GRANULOCYTES NFR BLD: 0.6 %
INR PPP: 2.32 (ref 0.86–1.14)
LYMPHOCYTES # BLD AUTO: 0.6 10E9/L (ref 0.8–5.3)
LYMPHOCYTES NFR BLD AUTO: 4.7 %
Lab: ABNORMAL
MCH RBC QN AUTO: 29 PG (ref 26.5–33)
MCHC RBC AUTO-ENTMCNC: 31.2 G/DL (ref 31.5–36.5)
MCV RBC AUTO: 93 FL (ref 78–100)
MONOCYTES # BLD AUTO: 0.8 10E9/L (ref 0–1.3)
MONOCYTES NFR BLD AUTO: 6.5 %
NEUTROPHILS # BLD AUTO: 10.4 10E9/L (ref 1.6–8.3)
NEUTROPHILS NFR BLD AUTO: 86.3 %
NRBC # BLD AUTO: 0 10*3/UL
NRBC BLD AUTO-RTO: 0 /100
PLATELET # BLD AUTO: 319 10E9/L (ref 150–450)
POTASSIUM SERPL-SCNC: 3.6 MMOL/L (ref 3.4–5.3)
PROT SERPL-MCNC: 5.9 G/DL (ref 6.8–8.8)
RBC # BLD AUTO: 3.24 10E12/L (ref 3.8–5.2)
SODIUM SERPL-SCNC: 139 MMOL/L (ref 133–144)
SPECIMEN SOURCE: ABNORMAL
WBC # BLD AUTO: 12 10E9/L (ref 4–11)

## 2018-06-11 PROCEDURE — 25000125 ZZHC RX 250: Performed by: INTERNAL MEDICINE

## 2018-06-11 PROCEDURE — A9270 NON-COVERED ITEM OR SERVICE: HCPCS | Mod: GY

## 2018-06-11 PROCEDURE — A9270 NON-COVERED ITEM OR SERVICE: HCPCS | Mod: GY | Performed by: NURSE PRACTITIONER

## 2018-06-11 PROCEDURE — A9270 NON-COVERED ITEM OR SERVICE: HCPCS | Mod: GY | Performed by: INTERNAL MEDICINE

## 2018-06-11 PROCEDURE — 85025 COMPLETE CBC W/AUTO DIFF WBC: CPT | Performed by: INTERNAL MEDICINE

## 2018-06-11 PROCEDURE — 40000239 ZZH STATISTIC VAT ROUNDS

## 2018-06-11 PROCEDURE — 99291 CRITICAL CARE FIRST HOUR: CPT

## 2018-06-11 PROCEDURE — 25000132 ZZH RX MED GY IP 250 OP 250 PS 637: Mod: GY | Performed by: HOSPITALIST

## 2018-06-11 PROCEDURE — 80053 COMPREHEN METABOLIC PANEL: CPT | Performed by: INTERNAL MEDICINE

## 2018-06-11 PROCEDURE — 85610 PROTHROMBIN TIME: CPT | Performed by: INTERNAL MEDICINE

## 2018-06-11 PROCEDURE — 94640 AIRWAY INHALATION TREATMENT: CPT

## 2018-06-11 PROCEDURE — 25000132 ZZH RX MED GY IP 250 OP 250 PS 637: Mod: GY

## 2018-06-11 PROCEDURE — 40000225 ZZH STATISTIC SLP WARD VISIT: Performed by: SPEECH-LANGUAGE PATHOLOGIST

## 2018-06-11 PROCEDURE — 92507 TX SP LANG VOICE COMM INDIV: CPT | Mod: GN | Performed by: SPEECH-LANGUAGE PATHOLOGIST

## 2018-06-11 PROCEDURE — 40000275 ZZH STATISTIC RCP TIME EA 10 MIN

## 2018-06-11 PROCEDURE — 20000003 ZZH R&B ICU

## 2018-06-11 PROCEDURE — 25000132 ZZH RX MED GY IP 250 OP 250 PS 637: Mod: GY | Performed by: NURSE PRACTITIONER

## 2018-06-11 PROCEDURE — 25000131 ZZH RX MED GY IP 250 OP 636 PS 637: Mod: GY | Performed by: INTERNAL MEDICINE

## 2018-06-11 PROCEDURE — 94003 VENT MGMT INPAT SUBQ DAY: CPT

## 2018-06-11 PROCEDURE — 25000132 ZZH RX MED GY IP 250 OP 250 PS 637: Mod: GY | Performed by: INTERNAL MEDICINE

## 2018-06-11 PROCEDURE — 27210432 ZZH NUTRITION PRODUCT RENAL BASIC LITER

## 2018-06-11 PROCEDURE — 00000146 ZZHCL STATISTIC GLUCOSE BY METER IP

## 2018-06-11 PROCEDURE — 99233 SBSQ HOSP IP/OBS HIGH 50: CPT | Performed by: NURSE PRACTITIONER

## 2018-06-11 PROCEDURE — 94640 AIRWAY INHALATION TREATMENT: CPT | Mod: 76

## 2018-06-11 PROCEDURE — A9270 NON-COVERED ITEM OR SERVICE: HCPCS | Mod: GY | Performed by: HOSPITALIST

## 2018-06-11 PROCEDURE — 40000008 ZZH STATISTIC AIRWAY CARE

## 2018-06-11 RX ORDER — WARFARIN SODIUM 3 MG/1
3 TABLET ORAL
Status: COMPLETED | OUTPATIENT
Start: 2018-06-11 | End: 2018-06-11

## 2018-06-11 RX ORDER — AMOXICILLIN 250 MG/5ML
500 POWDER, FOR SUSPENSION ORAL 2 TIMES DAILY
Status: DISCONTINUED | OUTPATIENT
Start: 2018-06-11 | End: 2018-06-14 | Stop reason: HOSPADM

## 2018-06-11 RX ADMIN — METOPROLOL TARTRATE 25 MG: 25 TABLET ORAL at 08:43

## 2018-06-11 RX ADMIN — METOPROLOL TARTRATE 25 MG: 25 TABLET ORAL at 17:47

## 2018-06-11 RX ADMIN — IPRATROPIUM BROMIDE AND ALBUTEROL SULFATE 3 ML: .5; 3 SOLUTION RESPIRATORY (INHALATION) at 12:08

## 2018-06-11 RX ADMIN — AMOXICILLIN 500 MG: 250 POWDER, FOR SUSPENSION ORAL at 14:12

## 2018-06-11 RX ADMIN — IPRATROPIUM BROMIDE AND ALBUTEROL SULFATE 3 ML: .5; 3 SOLUTION RESPIRATORY (INHALATION) at 19:46

## 2018-06-11 RX ADMIN — ACETAMINOPHEN 650 MG: 160 SUSPENSION ORAL at 08:46

## 2018-06-11 RX ADMIN — INSULIN ASPART 2 UNITS: 100 INJECTION, SOLUTION INTRAVENOUS; SUBCUTANEOUS at 16:27

## 2018-06-11 RX ADMIN — AMOXICILLIN 500 MG: 250 POWDER, FOR SUSPENSION ORAL at 22:41

## 2018-06-11 RX ADMIN — TORSEMIDE 10 MG: 10 TABLET ORAL at 16:53

## 2018-06-11 RX ADMIN — WARFARIN SODIUM 3 MG: 3 TABLET ORAL at 17:47

## 2018-06-11 RX ADMIN — POTASSIUM CHLORIDE 20 MEQ: 20 SOLUTION ORAL at 08:43

## 2018-06-11 RX ADMIN — IPRATROPIUM BROMIDE AND ALBUTEROL SULFATE 3 ML: .5; 3 SOLUTION RESPIRATORY (INHALATION) at 07:49

## 2018-06-11 RX ADMIN — TORSEMIDE 10 MG: 10 TABLET ORAL at 08:43

## 2018-06-11 RX ADMIN — ACETAMINOPHEN 650 MG: 160 SUSPENSION ORAL at 14:12

## 2018-06-11 RX ADMIN — MELATONIN 6 MG: 3 TAB ORAL at 22:41

## 2018-06-11 RX ADMIN — METOPROLOL TARTRATE 25 MG: 25 TABLET ORAL at 00:07

## 2018-06-11 RX ADMIN — CHLORHEXIDINE GLUCONATE 15 ML: 1.2 RINSE ORAL at 22:47

## 2018-06-11 RX ADMIN — FLUTICASONE PROPIONATE 1 SPRAY: 50 SPRAY, METERED NASAL at 08:44

## 2018-06-11 RX ADMIN — Medication 1 CAPSULE: at 08:43

## 2018-06-11 RX ADMIN — CHLORHEXIDINE GLUCONATE 15 ML: 1.2 RINSE ORAL at 08:43

## 2018-06-11 RX ADMIN — INSULIN ASPART 2 UNITS: 100 INJECTION, SOLUTION INTRAVENOUS; SUBCUTANEOUS at 22:46

## 2018-06-11 RX ADMIN — INSULIN GLARGINE 10 UNITS: 100 INJECTION, SOLUTION SUBCUTANEOUS at 22:47

## 2018-06-11 RX ADMIN — INSULIN ASPART 2 UNITS: 100 INJECTION, SOLUTION INTRAVENOUS; SUBCUTANEOUS at 00:05

## 2018-06-11 RX ADMIN — INSULIN ASPART 2 UNITS: 100 INJECTION, SOLUTION INTRAVENOUS; SUBCUTANEOUS at 11:49

## 2018-06-11 RX ADMIN — Medication 12.5 MG: at 03:08

## 2018-06-11 RX ADMIN — Medication 1 CAPSULE: at 22:41

## 2018-06-11 RX ADMIN — MIRTAZAPINE 30 MG: 7.5 TABLET ORAL at 22:42

## 2018-06-11 RX ADMIN — RANITIDINE HYDROCHLORIDE 150 MG: 150 SOLUTION ORAL at 08:43

## 2018-06-11 RX ADMIN — INSULIN ASPART 1 UNITS: 100 INJECTION, SOLUTION INTRAVENOUS; SUBCUTANEOUS at 08:43

## 2018-06-11 RX ADMIN — PREDNISOLONE ACETATE 1 DROP: 1.2 SUSPENSION/ DROPS OPHTHALMIC at 08:43

## 2018-06-11 RX ADMIN — PREDNISOLONE ACETATE 1 DROP: 1.2 SUSPENSION/ DROPS OPHTHALMIC at 22:47

## 2018-06-11 RX ADMIN — INSULIN ASPART 1 UNITS: 100 INJECTION, SOLUTION INTRAVENOUS; SUBCUTANEOUS at 04:13

## 2018-06-11 NOTE — PROGRESS NOTES
[facility]  Comprehensive Daily ICU Note        Jacques Solis MRN# [id number]   Age: 88 year old YOB: 1930     Date of Admission: 5/21/2018    Primary care provider: Michael Mccarthy     CODE STATUS: DNR      Problem List:   Dementia possibly with superimposed encephalopathy  Acute on chronic respiratory failure, no significant chance of weaning  Afib w complication of arterial embolism  Significant bleed secondary to Warfarin   Recurrent Ileus with inability to tolerate nutrition  Diabetes         Treatment goals for next 24 hours:   1. Treat enterococcal UTI  2. Discharge planning - home vs trach facility        Subjective/ Last 24 hours:     Jacques Solis is a 88 year old female who presents with inadequate home support and possible acute on chronic hypoxic respiratory failure. She has essentially been hospitalized since September 2017 when she was admitted to [facility] for critical limb ischemia and underwent balloon angioplasty which was complicated by femoral artery perforation with hemorrhagic shock.  She was trached and pegged on 11/13/17.  She was also noted to have some encephalopathy on a baseline of Alzheimer's dementia. She has essentially moved back and forth from [place] and [facility] and then to [facility] and [facility] ever since. Her other problems include severe aortic stenosis - not a candidate for TAVR, chronic diastolic heart failure, chronic bilateral recurrent pleural effusions, chronic vent dependence, history of pulmonary embolism, and atrial fibrillation. Her most recent admission to [facility] 4/26-5/4 was due to acute blood loss anemia due to left thigh hematoma in addition to renal failure due to tobramycin toxicity for multi-drug resistant Pseudomonas.  She also underwent debridement of a right lower extremity wound due to an infected hematoma.  She was ultimately discharged to [facility], where she stayed until discharge home on 5/18/18.  Pulmonology felt that she would  "not be able to wean and recommended discharge home or to TCU for chronic vented patients, and son elected for discharge home.      Son felt he was not sent home with adequate support or resources to manage patient at home and was also concerned that she was having a respiratory decompensation. Visited her [place] PCP on 5/22 and requested admission to [facility].      On admission here, son reported his goals are for his mother to be able to wean from the vent for at least a few hours and be able to speak with valve and possible eat some food by mouth.  He understands that she may never be able to fully wean from the vent and is accepting of this, however, he wants any reversible conditions treated. Both son and daughter say they were hoping a \"fresh set of eyes\" might come up with different answers for their mother but both agree that nothing much is different.    Review of chart from [facility] reveals that ethics consultation was requested and that goals of care conversations were held. Per notes, no dialysis was recommended and \"partial code\" was also recommended.  Currently listed as Full Code here.  Ethics committee at [facility] concurred with decisions made by [facility] staff (DNR however family yet to be updated). .     Since admission, no significant changes have occurred other than recurrent bleed into thigh (related to Coumadin) and recurrent ileus. Care conference held on 6/7, see separate note. Discharge planning ongoing.     INTERIM HISTORY   6/9 - 6/10  -  becoming unstable for turns per nursing - becomes hypoxemic,increased WOB,  FIO2 slowly increasing ,   - some elevated peak pressures on CMV - on off PS (not trials )    6/11: Failed pressure support trial after 20 minutes today.  No other acute events.             Mechanical Ventilation/Vitalsigns/IsandOs:   Temp:  [97.9  F (36.6  C)-99  F (37.2  C)] 98.1  F (36.7  C)  Heart Rate:  [67-98] 76  Resp:  [] 21  BP: ()/(43-86) " 106/68  FiO2 (%):  [40 %] 40 %  SpO2:  [96 %-100 %] 99 %      Intake/Output Summary (Last 24 hours) at 06/10/18 0701  Last data filed at 06/10/18 0600   Gross per 24 hour   Intake             1155 ml   Output             1235 ml   Net              -80 ml       Ventilation Mode: CPAP/PS  (Continuous positive airway pressure with Pressure Support)  FiO2 (%): 40 %  Rate Set (breaths/minute): 8 breaths/min  Tidal Volume Set (mL): 360 mL  PEEP (cm H2O): 5 cmH2O  Pressure Support (cm H2O): 15 cmH2O  Oxygen Concentration (%): 40 %  Resp: 21             Physical Examination:     General: sleepy, NAD  HEENT: tracheostomy, atraumatic  Lungs: LCTAB anteriorly  CVS: RRR  Abdomen: +BS, soft and non tender  Extremities/musculoskeletal: warm, edematous  Neurology: opens eyes to verbal stimuli. Will not follow basic commands  Skin: normal  Psychiatry: calm  Exam of Line sites:  PICC looks OK            Feeding/Glucose:   Tube feeds    Blood glucose/Insulin requirement last 24 hours: sliding scale and long acting          Medications:       amoxicillin  500 mg Oral or Feeding Tube BID     chlorhexidine  15 mL Swish & Spit BID     fluticasone  1 spray Both Nostrils Daily     insulin aspart  1-6 Units Subcutaneous Q4H     insulin glargine  10 Units Subcutaneous At Bedtime     ipratropium - albuterol 0.5 mg/2.5 mg/3 mL  1 vial Nebulization TID     lactobacillus rhamnosus (GG)  1 capsule Oral or Feeding Tube BID     melatonin tablet 6 mg  6 mg Per G Tube Q24H     metoprolol tartrate (LOPRESSOR) tablet 25 mg  25 mg Oral or Feeding Tube Q8H     mirtazapine (REMERON) tablet TABS 30 mg  30 mg Oral or Feeding Tube At Bedtime     potassium chloride  20 mEq Oral Daily     prednisoLONE acetate  1 drop Both Eyes BID     rantidine  150 mg Oral Daily     sodium chloride (PF)  10 mL Intracatheter Q7 Days     torsemide (DEMADEX) tablet 10 mg  10 mg Oral or Feeding Tube BID     warfarin  3 mg Oral ONCE at 18:00        - MEDICATION INSTRUCTIONS -        Warfarin Therapy Reminder                  Labs/Diagnostic studies:     EKG:  sinus    Echo:  No recent    ROUTINE ICU LABS (Last four results)  CMP    Recent Labs  Lab 06/11/18  0520 06/10/18  0440 06/09/18  0450 06/08/18  0431    139 139 138   POTASSIUM 3.6 3.5 3.6 3.6   CHLORIDE 96 97 95 96   CO2 38* 36* 35* 35*   ANIONGAP 5 6 9 7   * 170* 168* 92   BUN 50* 52* 57* 58*   CR 1.22* 1.25* 1.31* 1.25*   GFRESTIMATED 42* 40* 38* 40*   GFRESTBLACK 50* 49* 46* 49*   KRISTOFER 8.4* 8.1* 8.4* 8.2*   PROTTOTAL 5.9*  --   --   --    ALBUMIN 1.9*  --   --   --    BILITOTAL 0.8  --   --   --    ALKPHOS 118  --   --   --    AST 29  --   --   --    ALT 21  --   --   --      CBC    Recent Labs  Lab 06/11/18  0520 06/10/18  0440 06/09/18  0450 06/08/18  0431   WBC 12.0* 10.6 10.4 11.0   RBC 3.24* 3.12* 3.32* 3.26*   HGB 9.4* 9.1* 9.5* 9.3*   HCT 30.1* 28.9* 30.8* 30.9*   MCV 93 93 93 95   MCH 29.0 29.2 28.6 28.5   MCHC 31.2* 31.5 30.8* 30.1*   RDW 18.0* 18.1* 18.0* 18.5*    329 327 324     INR    Recent Labs  Lab 06/11/18  0520 06/10/18  0440 06/09/18  0450 06/08/18  0431   INR 2.32* 2.26* 2.16* 1.65*     Arterial Blood GasNo lab results found in last 7 days.    Cultures:  No results for input(s): CULT in the last 168 hours.  Blood culture:  Invalid input(s): BC   Urine culture:  No results for input(s): URC in the last 168 hours.            Imaging:     No results found for this or any previous visit (from the past 24 hour(s)).           Assessment and Plan:     Summary:  Jacques Solis is a 88 year old female with past history A-fib, CHF, severe AS, DM II, asthma, chronic respiratory failure with vent dependence who was discharged home from [facility] on 5/18/18 but returned to clinic on 5/22 and requested hospitalization because of inability to manage respiratory status at home.    Neurology and Psychiatry:  1. Dementia with superimposed encephalopathy:  Per [facility] chart her baseline dementia prior to this  prolonged illness was being oriented to self and able to answer basic questions - dementia was significant enough that it disqualified her from receiving TAVR. Was on Nemenda last year prior to hospitalization.  Valproic acid started this hospitalization for agitation. Son felt it made her too sleepy so it was discontinued.  Plan  - Continue PTA Seroquel PRN for agitation/anixety  - Continue HS Mirtazipine and Melatonin  - Monitor for signs of agitation/pain and treat as necessary     Pulmonary:   1. Acute on chronic hypoxic respiratory failure with vent dependence:  Chronic failure felt due to pleural effusions and overall debility. Pulmonologist at [facility] felt she would chronically be vent dependent and weaning efforts were stopped, which is affirmed on this admission and her course thus far.    - CT chest in ED showed small to moderated loculated bilateral pleural effusions with atelectasis (chronic)   Also a 4 cm gil opacity in left upper lobe, but no SIRS criteria present. Tolerating some time on pressure support and some time on passy wilner and continued as tolerated but tenuous in terms of moves with desaturations 6/10   - Working to arrange discharge to home vs a vent facility.  Plan  - Continue scheduled bronchodilators.  - CMV vs PS for patient comfort - not going to be liberated   - will need trach exchanged prior to discharge (7 bivona)    Cardiovascular system:   1. Paroxysmal A-fib: Also related to lower extremity arterial clot.   2. Severe AS and chronic diastolic CHF: TTE 3/2018 with EF 55-60%, severe AS (area of 0.6-0.7) and mild regurgitation, moderate MR. Not a surgical or TAVR candidate due to advanced dementia  Plan  - continue torsemide at home BID dosing 10 mg. Aim for net even.  - resumed scheduled KCL given diuretic 20 me daily   -  have resumed anticoagulation at family request. Had a significant bleed on Warfarin so this is high risk but son requesting it be resumed.  INR therapeutic.  2-3   - continue PTA metoprolol (note son has been dosing this at 25 mg q8h) - required intermittent IV dose 5/30      Renal/Electrolytes:  1. Renal - Non oliguric PAULY - resolved - Cr rise times with acute bleed with hypotension:  Huggins removed per family request but replaced 6/1 given ongoing skin breakdown and difficulty monitoring UOP.   2. UTI - most recent culture with sensitive Klebsiella s/p treatment.  Enterococcus was not treated.  Will elect to start treatment now since WBC is climbing.  Will discuss with son removal of catheter.   Plan   - follow Cr, UOP, maintain hemodynamics. Continue outpatient diuretics. Aim for net even fluid balance.  - follow with huggins (placed for comfort measures and mitigate skin breakdown)      ID:   1. Kleb and enterococcus UTI as above.  Plan  - Completed a 7 day course of Ciprofloxacin.    GI//Nutrition:  1. Aspiration Risk - Not safe to swallow. On chronic tube feeds. Intermittently unable to tolerate 2/2 ileus    2. Intermittent ileus.  This has again become an issue today, 6/7.Improving last BM 6/10   Plan  - continue tube feeds as tolerated    Musculoskeletal/Rheumatology:  1. Non healing wound. 2/2 debiliation , bed ridden  Plan   - Wound cares per unit routine.     Endocrine:   1. DM. Sugars controlled on long acting and sliding scale insulin.  Sugars bit labile in setting of being on and off tube feeds requiring intermittent holds.  Lantus dose decreased   Plan  - goal sugar <180   - continue SS    Heme/Onc:  1. Recent acute blood loss anemia with thigh hematomas in context of elevated INR. Prior bleed also on AC (lovenox)(Confirmed on CT 5/31): Hgb Stable  2. Coagulopathy in setting of variable AC and nutritional status  Plan   - daily INR.       ICU prophylaxis:      DVT: Warfarin     VAP: As above     Stress ulcer: Ranitidine     Restraints needed: no     Lines   Central Line/PICC - placed unknown needed: y   Arterial line - placed n needed: n   Huggins catheter.   Needed: to prevent skin breakdown, yes.        Prognosis:  Poor      Family update: I spoke with her son over the phone and daughter in the room.     Billing: total time spend providing critical care was 45 min, excluding procedure time.

## 2018-06-11 NOTE — PLAN OF CARE
Problem: Patient Care Overview  Goal: Plan of Care/Patient Progress Review    Discharge Planner SLP   Patient plan for discharge: Did not state  Current status: [place] Valve (PMV) communication Tx was provided this pm with RT assist.  Patient tolerated PMV placement while on pressure support with stable RR 19-22 and saturation levels above 95% for 15 minutes.  Patient produced phrase level speech with good voicing.  Patient required mod-max cues/repetition to respond to questions directly due to decreased attention and confusion.  Patient was not able to correctly state name, age, or current location.  Patient was able to read 5 numbers and 1/4 words correctly out loud given mod cues.  Patient asked to discontinue the session after 15 minutes due to fatigue.  Decreased ability to follow directions and recall as well as fatigue have limited progress.  Plan to continue Tx 3x/week as able until discharge.  Recommend PMV with SLP and RT supervision only at this time.  Barriers to return to prior living situation: To be determined per discharge planning  Recommendations for discharge: Per discharge planning, SLP communication Tx after discharge per patient tolerance  Rationale for recommendations: SLP communication Tx if patient able to tolerate given previously stated family goals       Entered by: Radha Sorenson 06/11/2018 2:51 PM

## 2018-06-11 NOTE — PLAN OF CARE
Problem: Patient Care Overview  Goal: Plan of Care/Patient Progress Review  Outcome: Declining  Pt's O2 needs increasing, very tenuous to turn/reposition d/t increased work of breathing/pt turning deep red/purple, pt also very anxious/restless and frightened at times.  Unable to lift to chair today d/t these reasons, simple turns are becoming difficult for pt to tolerate.  Pt now DNR after conference w/ Dr. Cardenas.  BM x2 today, large, difficult for pt to tolerate when on side to clean bottom.  Pt pressure supported for short time, on vent most of day.  Plan: d/c to vent facility, assessment meeting tomorrow.

## 2018-06-11 NOTE — PROGRESS NOTES
"[facility]    Palliative Care Progress Note    Jacques Solis  MRN# [id number]  Date of Admission:  2018  Date of Service (when I saw the patient): 2018    Assessment & Plan   Jacques Solis is a 88 year old female with PMH significant for advanced dementia with intermittent encephalopathy, a.fib, CHF, severe AS, DM2, recent acute blood loss anemia 2/2 thigh hematoma with resultant chronic non-healing wound, PE, and chronic respiratory failure with ventilator dependence, who presents from home with worsening respiratory status after being discharged from [facility] 3 days prior to admission. Of note, throughout extensive hospitalizations at [facility], Palliative care and Ethics were extensively involved in pt's care. Ethics was consulted this admission and recommends DNR and no escalation of care. Dr. Vega had a family meeting on ; family recognizes that pt is dying, but hopes to bring her home and have more meaningful time. We are following for decisional support and pt and family support.      Symptoms/Recommendations   -Per daughter's request, called [facility] who confirms they do not do ventilator cares/support at their facility   -Per daughter's request, I have asked the medical director for [place] [facility] to review Jacques's case and consider hospice care at home with ongoing ventilator support; prepared daughter that the likely answer will be no   -Will contact unit CC regarding palliative home care services at home to ideally help in transition to hospice at home, should family ever reach that point   -Sounds like family is still leaning toward trying to bring Jacques home after discharge. They are \"keeping their options open\" however      Support/Coping  -Per daughter Jen, there are complex family dynamics. Pt's  Eric  when he was 45. He was in [place] and had rapid weight loss, leading to parenteral nutrition and a punctured lung, followed by chest tubes. Pt's son " "Yariel then vowed to be Jacques's caretaker for the remainder of her life. He went to medical school to ideally prevent medical tragedies from happening to his mother. He has been living with her his whole life. He is not , no children. Daughter Jen is a single mother, she has a daughter named Marissa, in Jen's father's honor   -Family is Congregational and reportedly has [facility]      Decisional Support, Goals of Care, Counseling & Coordination  Decisional Capacity Intact?  -No  Health Care Directive on File?  -Yes, I did not review this today   Code Status/Resuscitation Preferences?  -Changed to DNR over the weekend     Discussion  Visited with daughter Jen in Jacques's room this afternoon. Jacques was sleeping in the chair and did not participate in the conversation. Jen was quite jovial at the start of our conversation. I offered to talk to her about different ways to care for her mother. She then wanted to meet in private.    Visited with Jen in private. She became quite emotional and tearful in talking about her mother. She and Yariel were told this weekend that Jacques is dying. They have known that all along, but were shocked this information was said in front of Jacques. Later in the day, Jacques was able to tell Jen that she heard that she was dying and wondered when it would be. She was afraid and worried. Jen was very upset about this interaction. Apparently Jen asked Jacques if she was mad at the kids for keeping her alive. She stated \"no.\"     Jen states that she and Yariel are very much prepared that Jacques is dying. They want her to die peacefully in her sleep. They have started on a journey that they cannot begin to understand the complexity; how it started or ended up this way. It appears they may be struggling to understand how to change course or direction.     They really want to bring her home, as that is where Jacques wants to be. They want to honor this for Jacques. We talk " "about how chronically ill and complex Florentins care is. I express worry that even in the best intentions, and maximal support, it will likely be difficult to keep her stable and comfortable at home. Jen admits they don't really have a plan for the next time Jacques gets sick, but really don't want to bring her back to the hospital. She ideally would like her mother to die at home, in her sleep.    Jen and I talk about hospice, and the limitations with ventilator support. We talk about turning off the ventilator, time likely being measured in hours to days after it is stopped. Jen would like me to ask hospice if they will make an exception for the ventilator. I prepared her that the likely answer is no, but I will ask. She also wonders about [facility] and if they provide ventilator cares. I told her I did not believe so, but will also look into this (they do not, this was confirmed this afternoon).     Jen remained quite emotionally labile throughout our discussion. She feels that the medical team is mad at  and Yariel and that we just want to push them out of the hospital to make room for another patient. She is mad that there has not been a lot of compassion toward them, stating \"we're human you know.\" I get the sense that she is also asking of the medical team things that we cannot actually do or replicate at home, but then turning it on us and insinuating that we are not pulling strings or that we are even withholding care.     I provided emotional support, while consistently asking how we can best support them. Jen seems very overwhelmed and confused as to know what to do next. She would like answers to the above stated questions.     Case reviewed with Dr. Moeller, unit CC Judie, and bedside RN Luis Angel.     Thank you for involving us in the care of this patient and family. We will continue to follow. Please do not hesitate to contact me with questions or concerns or the on-call provider for our " team if evening or weekend.    Tiffanie REINA, [facility]  Palliative Medicine   Pager [phone]    Attestation:  Total time on the floor involved in the patient's care: 60 minutes  Total time spent in counseling/care coordination: >50%    Interval History   Remains on ventilator support. Appears generally stable. Disposition planning underway, unclear if she will go to a ventilator facility vs home.     Medications   Current Facility-Administered Medications Ordered in Epic   Medication Dose Route Frequency Last Rate Last Dose     acetaminophen (TYLENOL) tablet 650 mg  650 mg Oral Q4H PRN        Or     acetaminophen (TYLENOL) solution 650 mg  650 mg Per NG tube Q4H PRN   650 mg at 06/11/18 1412     amoxicillin (AMOXIL) suspension 500 mg  500 mg Oral or Feeding Tube BID   500 mg at 06/11/18 1412     bisacodyl (DULCOLAX) Suppository 10 mg  10 mg Rectal Daily PRN         chlorhexidine (PERIDEX) 0.12 % solution 15 mL  15 mL Swish & Spit BID   15 mL at 06/11/18 0843     glucose gel 15-30 g  15-30 g Oral Q15 Min PRN        Or     dextrose 50 % injection 25-50 mL  25-50 mL Intravenous Q15 Min PRN        Or     glucagon injection 1 mg  1 mg Subcutaneous Q15 Min PRN         fluticasone (FLONASE) 50 MCG/ACT spray 1 spray  1 spray Both Nostrils Daily   1 spray at 06/11/18 0844     hydrogen peroxide 3 % solution   Topical TID PRN         insulin aspart (NovoLOG) inj (RAPID ACTING)  1-6 Units Subcutaneous Q4H   2 Units at 06/11/18 1149     insulin glargine (LANTUS) injection 10 Units  10 Units Subcutaneous At Bedtime   10 Units at 06/10/18 2203     ipratropium - albuterol 0.5 mg/2.5 mg/3 mL (DUONEB) neb solution 3 mL  1 vial Nebulization TID   3 mL at 06/11/18 1208     lactobacillus rhamnosus (GG) (CULTURELL) capsule 1 capsule  1 capsule Oral or Feeding Tube BID   1 capsule at 06/11/18 0843     melatonin tablet 6 mg  6 mg Per G Tube Q24H   6 mg at 06/10/18 1957     metoprolol (LOPRESSOR) injection 5 mg  5 mg Intravenous Q6H PRN    5 mg at 05/31/18 1200     metoprolol tartrate (LOPRESSOR) tablet 25 mg  25 mg Oral or Feeding Tube Q8H   25 mg at 06/11/18 0843     mirtazapine (REMERON) tablet TABS 30 mg  30 mg Oral or Feeding Tube At Bedtime   30 mg at 06/10/18 2203     naloxone (NARCAN) injection 0.1-0.4 mg  0.1-0.4 mg Intravenous Q2 Min PRN         ondansetron (ZOFRAN-ODT) ODT tab 4 mg  4 mg Oral Q6H PRN        Or     ondansetron (ZOFRAN) injection 4 mg  4 mg Intravenous Q6H PRN   4 mg at 06/06/18 2138     Patient is already receiving anticoagulation with heparin, enoxaparin (LOVENOX), warfarin (COUMADIN)  or other anticoagulant medication   Does not apply Continuous PRN         polyethylene glycol (MIRALAX/GLYCOLAX) Packet 17 g  17 g Oral or Feeding Tube TID PRN   17 g at 06/03/18 0907     potassium chloride (KAYCIEL) solution 20 mEq  20 mEq Oral Daily   20 mEq at 06/11/18 0843     potassium chloride (KLOR-CON) Packet 20-40 mEq  20-40 mEq Oral or Feeding Tube Q2H PRN   20 mEq at 05/26/18 1150     potassium chloride 10 mEq in 100 mL intermittent infusion with 10 mg lidocaine  10 mEq Intravenous Q1H PRN         potassium chloride 10 mEq in 100 mL sterile water intermittent infusion (premix)  10 mEq Intravenous Q1H PRN         potassium chloride 20 mEq in 50 mL intermittent infusion  20 mEq Intravenous Q1H PRN         potassium chloride SA (K-DUR/KLOR-CON M) CR tablet 20-40 mEq  20-40 mEq Oral Q2H PRN         prednisoLONE acetate (PRED MILD) 0.12 % ophthalmic susp 1 drop  1 drop Both Eyes BID   1 drop at 06/11/18 0843     prochlorperazine (COMPAZINE) injection 5 mg  5 mg Intravenous Q6H PRN        Or     prochlorperazine (COMPAZINE) tablet 5 mg  5 mg Oral Q6H PRN        Or     prochlorperazine (COMPAZINE) Suppository 12.5 mg  12.5 mg Rectal Q12H PRN         QUEtiapine (SEROquel) half-tab 12.5 mg  12.5 mg Oral Q8H PRN   12.5 mg at 06/11/18 0308     ranitidine (Zantac) syrup 150 mg  150 mg Oral Daily   150 mg at 06/11/18 0843     sennosides  (SENOKOT) syrup 5 mL  5 mL Oral or Feeding Tube Daily PRN         simethicone (MYLICON) suspension 40 mg  40 mg Oral Q6H PRN   40 mg at 06/03/18 0927     sodium chloride (PF) 0.9% PF flush 10 mL  10 mL Intracatheter Q7 Days   10 mL at 06/09/18 2202     sodium chloride (PF) 0.9% PF flush 10-20 mL  10-20 mL Intracatheter Q1H PRN   10 mL at 06/06/18 2139     sodium phosphate 15 mmol in D5W intermittent infusion  15 mmol Intravenous Daily PRN         sodium phosphate 20 mmol in D5W intermittent infusion  20 mmol Intravenous Q6H PRN         sodium phosphate 25 mmol in D5W intermittent infusion  25 mmol Intravenous Q8H PRN         torsemide (DEMADEX) tablet 10 mg  10 mg Oral or Feeding Tube BID   10 mg at 06/11/18 0843     warfarin (COUMADIN) tablet 3 mg  3 mg Oral ONCE at 18:00         Warfarin Therapy Reminder (Check START DATE - warfarin may be starting in the FUTURE)  1 each Does not apply Continuous PRN         zinc oxide (DESITIN) 40 % ointment   Topical Q1H PRN         Current Outpatient Prescriptions Ordered in Epic   Medication     blood glucose (NO BRAND SPECIFIED) lancets standard     blood glucose calibration (NO BRAND SPECIFIED) solution     blood glucose monitoring (NO BRAND SPECIFIED) meter device kit     blood glucose monitoring (NO BRAND SPECIFIED) test strip       Physical Exam   Temp: 98.2  F (36.8  C) Temp src: Bladder BP: 107/51   Heart Rate: 78 Resp: 20 SpO2: 100 % O2 Device: Mechanical Ventilator    Vitals:    06/09/18 0400 06/10/18 0600 06/11/18 0600   Weight: 87.8 kg (193 lb 9 oz) 86.5 kg (190 lb 11.2 oz) 88.7 kg (195 lb 8.8 oz)     CONSTITUTIONAL: Chronically ill woman seen sleeping in ICU chair. Family present  NECK: Trach   RESPIRATORY: Mechanical ventilator     Data   Results for orders placed or performed during the hospital encounter of 05/21/18 (from the past 24 hour(s))   Glucose by meter   Result Value Ref Range    Glucose 199 (H) 70 - 99 mg/dL   Glucose by meter   Result Value Ref Range     Glucose 202 (H) 70 - 99 mg/dL   Glucose by meter   Result Value Ref Range    Glucose 239 (H) 70 - 99 mg/dL   Glucose by meter   Result Value Ref Range    Glucose 149 (H) 70 - 99 mg/dL   INR   Result Value Ref Range    INR 2.32 (H) 0.86 - 1.14   Comprehensive metabolic panel   Result Value Ref Range    Sodium 139 133 - 144 mmol/L    Potassium 3.6 3.4 - 5.3 mmol/L    Chloride 96 94 - 109 mmol/L    Carbon Dioxide 38 (H) 20 - 32 mmol/L    Anion Gap 5 3 - 14 mmol/L    Glucose 149 (H) 70 - 99 mg/dL    Urea Nitrogen 50 (H) 7 - 30 mg/dL    Creatinine 1.22 (H) 0.52 - 1.04 mg/dL    GFR Estimate 42 (L) >60 mL/min/1.7m2    GFR Estimate If Black 50 (L) >60 mL/min/1.7m2    Calcium 8.4 (L) 8.5 - 10.1 mg/dL    Bilirubin Total 0.8 0.2 - 1.3 mg/dL    Albumin 1.9 (L) 3.4 - 5.0 g/dL    Protein Total 5.9 (L) 6.8 - 8.8 g/dL    Alkaline Phosphatase 118 40 - 150 U/L    ALT 21 0 - 50 U/L    AST 29 0 - 45 U/L   CBC with platelets differential   Result Value Ref Range    WBC 12.0 (H) 4.0 - 11.0 10e9/L    RBC Count 3.24 (L) 3.8 - 5.2 10e12/L    Hemoglobin 9.4 (L) 11.7 - 15.7 g/dL    Hematocrit 30.1 (L) 35.0 - 47.0 %    MCV 93 78 - 100 fl    MCH 29.0 26.5 - 33.0 pg    MCHC 31.2 (L) 31.5 - 36.5 g/dL    RDW 18.0 (H) 10.0 - 15.0 %    Platelet Count 319 150 - 450 10e9/L    Diff Method Automated Method     % Neutrophils 86.3 %    % Lymphocytes 4.7 %    % Monocytes 6.5 %    % Eosinophils 1.6 %    % Basophils 0.3 %    % Immature Granulocytes 0.6 %    Nucleated RBCs 0 0 /100    Absolute Neutrophil 10.4 (H) 1.6 - 8.3 10e9/L    Absolute Lymphocytes 0.6 (L) 0.8 - 5.3 10e9/L    Absolute Monocytes 0.8 0.0 - 1.3 10e9/L    Absolute Eosinophils 0.2 0.0 - 0.7 10e9/L    Absolute Basophils 0.0 0.0 - 0.2 10e9/L    Abs Immature Granulocytes 0.1 0 - 0.4 10e9/L    Absolute Nucleated RBC 0.0    Glucose by meter   Result Value Ref Range    Glucose 184 (H) 70 - 99 mg/dL   Glucose by meter   Result Value Ref Range    Glucose 214 (H) 70 - 99 mg/dL

## 2018-06-11 NOTE — PROGRESS NOTES
"Care Coordination:    Received call from Jen, regarding a lift chair for home. Asked if this was the plan again to go home? Jen stated\"we are back and forth\" Explained that [employer] did have an opening and they are coming out this afternoon. Need decision from them.  Willmicaela had bid for lift part of chair. Family would have to buy a recliner for patient then lift would fit under chair. Jen said she would check with Yariel, patient's son to see if that's what they wanted.    Per ICU MD, family request that we fax over order for weekly INR through [employer]. Faxed to number provided to [facility].    Judie Lloyd RN BSN  [facility] Care Coordinator  Phone [phone]    "

## 2018-06-11 NOTE — PLAN OF CARE
Problem: Patient Care Overview  Goal: Plan of Care/Patient Progress Review  Outcome: No Change  VSS. Full vent support through night. Anxious at times, rests well between cares. Pulls at different things in arms reach. Small amounts of secretions from trach although needs suctioned frequently.  No BM, good UOP. Assessment from Vent home planned today

## 2018-06-11 NOTE — PROGRESS NOTES
[facility] ICU RESPIRATORY NOTE  Date of Admission: 5/21/2018  Date of Intubation (most recent): Vent dependent  Reason for Mechanical Ventilation: Acute on chronic respiratory failure  Number of Days on Mechanical Ventilation: 22  Met Criteria for Pressure Support Trial: Yes  Length of Pressure Support Trial: PS 15/5 40% from 12:08pm and continuous       ABG Results:  No lab results found in last 7 days.  Ventilation Mode: CPAP/PS  (Continuous positive airway pressure with Pressure Support)  FiO2 (%): 40 %  Rate Set (breaths/minute): 8 breaths/min  Tidal Volume Set (mL): 360 mL  PEEP (cm H2O): 5 cmH2O  Pressure Support (cm H2O): 15 cmH2O  Oxygen Concentration (%): 40 %  Resp: 20    ETT appearance on chest x-ray: trach site clean, patent and secure    Plan: continue weaning trial and put full support for rest at night.  6/11/2018  Esthela Echevarria

## 2018-06-11 NOTE — PROGRESS NOTES
CLINICAL NUTRITION SERVICES - REASSESSMENT NOTE    Consider increasing SSI and/or Lantus for improved BG management     Recommendations Ordered by Registered Dietitian (RD): Decrease TF rate to avoid overfeeding --> Nepro at 35 mL/hr to provide:  1512 kcal (24 kcal/kg), 68 g protein (1.1 g/kg), 135 g CHO, 11 g fiber, 613 mL H2O   Malnutrition:   (5/22)  % Weight Loss:  None noted  % Intake:  No decreased intake noted  Subcutaneous Fat Loss:  None observed  Muscle Loss:  None observed  Fluid Retention:  None noted      Malnutrition Diagnosis: Patient does not meet two of the above criteria necessary for diagnosing malnutrition        EVALUATION OF PROGRESS TOWARD GOALS   Diet:  NPO   Nutrition Support:  Patient's TF was resumed on 6/7 (at 10 mL/hr), increased to goal on 6/9, and continues as follows ~    Nutrition Support Enteral:  Type of Feeding Tube: G-tube   Enteral Frequency:  Continuous  Enteral Regimen: Nepro at 40 mL/hr  Total Enteral Provisions: 1728 kcal (28 kcal/kg and 111% needs), 78 g protein (1.3 g/kg and 104% needs), 155 g CHO, 12 g fiber, 701 mL H2O  Free Water Flush: 70 mL every 4 hours       Intake/Tolerance:    BUN 50 (H), Cr 1.22 (H) - CKD   , 174, 168, 199, 202, 239, 149, 184 - Medium SSI + Lantus 10 units at HS (On Prednisolone)  Stooling history --> BM x 2 on 6/10, smear BM on 6/8 - Receiving Culturell   Weight = 88.7 kg, I/O 1650/1090, 2+ mild generalized edema         ASSESSED NUTRITION NEEDS PER APPROVED PRACTICE GUIDELINES:      Dosing Weight 61.8 kg   Estimated Energy Needs: 4573-0865 kcals (20-25 Kcal/Kg)  Justification: obese and vented  Estimated Protein Needs: 50-75 grams protein (0.8-1.2 g pro/Kg)  Justification: CKD      NEW FINDINGS:   Patient was previously receiving Cipro but this was discontinued on 6/10 (TF was being held 1 hour before and 1 hour after administration)    Plan for home with vent, possibly today     Previous Goals (6/7):   TF will advance towards eventual  goal within the next 48 hours   Evaluation: Met    Previous Nutrition Diagnosis (6/7):   Inadequate enteral nutrition infusion related to TF off, plans to resume at 10 mL/hr as evidenced by meeting 0% needs  Evaluation: Resolved       CURRENT NUTRITION DIAGNOSIS  Excessive energy intake related to TF at 40 mL/hr, no longer holding for Cipro as evidenced by meeting 111% energy needs     INTERVENTIONS  Recommendations / Nutrition Prescription  Decrease TF rate to avoid overfeeding --> Nepro at 35 mL/hr to provide:  1512 kcal (24 kcal/kg), 68 g protein (1.1 g/kg), 135 g CHO, 11 g fiber, 613 mL H2O    Consider increasing SSI and/or Lantus for improved BG management     Implementation  EN Composition:  TF rate decreased to 35 mL/hr as above   Collaboration and Referral of Nutrition care:  Patient discussed earlier today during interdisciplinary bedside rounds     Goals  Nepro at 35 mL/hr will meet % needs    MONITORING AND EVALUATION:  Progress towards goals will be monitored and evaluated per protocol and Practice Guidelines    Alma Arcos, RD, LD, CNSC   Clinical Dietitian - [facility]

## 2018-06-12 ENCOUNTER — APPOINTMENT (OUTPATIENT)
Dept: PHYSICAL THERAPY | Facility: CLINIC | Age: 83
DRG: 207 | End: 2018-06-12
Payer: MEDICARE

## 2018-06-12 LAB
ANION GAP SERPL CALCULATED.3IONS-SCNC: 5 MMOL/L (ref 3–14)
BUN SERPL-MCNC: 52 MG/DL (ref 7–30)
CALCIUM SERPL-MCNC: 8 MG/DL (ref 8.5–10.1)
CHLORIDE SERPL-SCNC: 97 MMOL/L (ref 94–109)
CO2 SERPL-SCNC: 36 MMOL/L (ref 20–32)
CREAT SERPL-MCNC: 1.13 MG/DL (ref 0.52–1.04)
ERYTHROCYTE [DISTWIDTH] IN BLOOD BY AUTOMATED COUNT: 18.1 % (ref 10–15)
GFR SERPL CREATININE-BSD FRML MDRD: 45 ML/MIN/1.7M2
GLUCOSE BLDC GLUCOMTR-MCNC: 171 MG/DL (ref 70–99)
GLUCOSE BLDC GLUCOMTR-MCNC: 218 MG/DL (ref 70–99)
GLUCOSE BLDC GLUCOMTR-MCNC: 219 MG/DL (ref 70–99)
GLUCOSE BLDC GLUCOMTR-MCNC: 221 MG/DL (ref 70–99)
GLUCOSE BLDC GLUCOMTR-MCNC: 236 MG/DL (ref 70–99)
GLUCOSE BLDC GLUCOMTR-MCNC: 245 MG/DL (ref 70–99)
GLUCOSE SERPL-MCNC: 219 MG/DL (ref 70–99)
HCT VFR BLD AUTO: 29.7 % (ref 35–47)
HGB BLD-MCNC: 9.1 G/DL (ref 11.7–15.7)
INR PPP: 2.76 (ref 0.86–1.14)
MCH RBC QN AUTO: 28.7 PG (ref 26.5–33)
MCHC RBC AUTO-ENTMCNC: 30.6 G/DL (ref 31.5–36.5)
MCV RBC AUTO: 94 FL (ref 78–100)
PLATELET # BLD AUTO: 344 10E9/L (ref 150–450)
POTASSIUM SERPL-SCNC: 3.9 MMOL/L (ref 3.4–5.3)
RBC # BLD AUTO: 3.17 10E12/L (ref 3.8–5.2)
SODIUM SERPL-SCNC: 138 MMOL/L (ref 133–144)
WBC # BLD AUTO: 12.7 10E9/L (ref 4–11)

## 2018-06-12 PROCEDURE — 97602 WOUND(S) CARE NON-SELECTIVE: CPT

## 2018-06-12 PROCEDURE — 85027 COMPLETE CBC AUTOMATED: CPT | Performed by: HOSPITALIST

## 2018-06-12 PROCEDURE — 40000275 ZZH STATISTIC RCP TIME EA 10 MIN

## 2018-06-12 PROCEDURE — 25000132 ZZH RX MED GY IP 250 OP 250 PS 637: Mod: GY | Performed by: INTERNAL MEDICINE

## 2018-06-12 PROCEDURE — A9270 NON-COVERED ITEM OR SERVICE: HCPCS | Mod: GY

## 2018-06-12 PROCEDURE — 25000132 ZZH RX MED GY IP 250 OP 250 PS 637: Mod: GY | Performed by: NURSE PRACTITIONER

## 2018-06-12 PROCEDURE — 25000131 ZZH RX MED GY IP 250 OP 636 PS 637: Mod: GY | Performed by: NURSE PRACTITIONER

## 2018-06-12 PROCEDURE — 94640 AIRWAY INHALATION TREATMENT: CPT

## 2018-06-12 PROCEDURE — A9270 NON-COVERED ITEM OR SERVICE: HCPCS | Mod: GY | Performed by: HOSPITALIST

## 2018-06-12 PROCEDURE — 00000146 ZZHCL STATISTIC GLUCOSE BY METER IP

## 2018-06-12 PROCEDURE — 85610 PROTHROMBIN TIME: CPT | Performed by: HOSPITALIST

## 2018-06-12 PROCEDURE — 94003 VENT MGMT INPAT SUBQ DAY: CPT

## 2018-06-12 PROCEDURE — 94640 AIRWAY INHALATION TREATMENT: CPT | Mod: 76

## 2018-06-12 PROCEDURE — 40000193 ZZH STATISTIC PT WARD VISIT: Performed by: PHYSICAL THERAPIST

## 2018-06-12 PROCEDURE — 40000257 ZZH STATISTIC CONSULT NO CHARGE VASC ACCESS

## 2018-06-12 PROCEDURE — 20000003 ZZH R&B ICU

## 2018-06-12 PROCEDURE — 97110 THERAPEUTIC EXERCISES: CPT | Mod: GP | Performed by: PHYSICAL THERAPIST

## 2018-06-12 PROCEDURE — 40000239 ZZH STATISTIC VAT ROUNDS

## 2018-06-12 PROCEDURE — 80048 BASIC METABOLIC PNL TOTAL CA: CPT | Performed by: HOSPITALIST

## 2018-06-12 PROCEDURE — A9270 NON-COVERED ITEM OR SERVICE: HCPCS | Mod: GY | Performed by: INTERNAL MEDICINE

## 2018-06-12 PROCEDURE — G0463 HOSPITAL OUTPT CLINIC VISIT: HCPCS

## 2018-06-12 PROCEDURE — 25000132 ZZH RX MED GY IP 250 OP 250 PS 637: Mod: GY

## 2018-06-12 PROCEDURE — 25000125 ZZHC RX 250: Performed by: INTERNAL MEDICINE

## 2018-06-12 PROCEDURE — 40000008 ZZH STATISTIC AIRWAY CARE

## 2018-06-12 PROCEDURE — 99233 SBSQ HOSP IP/OBS HIGH 50: CPT

## 2018-06-12 PROCEDURE — 40000893 ZZH STATISTIC PT IP EVAL DEFER: Performed by: PHYSICAL THERAPIST

## 2018-06-12 PROCEDURE — 25000132 ZZH RX MED GY IP 250 OP 250 PS 637: Mod: GY | Performed by: HOSPITALIST

## 2018-06-12 PROCEDURE — A9270 NON-COVERED ITEM OR SERVICE: HCPCS | Mod: GY | Performed by: NURSE PRACTITIONER

## 2018-06-12 RX ORDER — WARFARIN SODIUM 3 MG/1
3 TABLET ORAL
Status: COMPLETED | OUTPATIENT
Start: 2018-06-12 | End: 2018-06-12

## 2018-06-12 RX ADMIN — IPRATROPIUM BROMIDE AND ALBUTEROL SULFATE 3 ML: .5; 3 SOLUTION RESPIRATORY (INHALATION) at 12:04

## 2018-06-12 RX ADMIN — CHLORHEXIDINE GLUCONATE 15 ML: 1.2 RINSE ORAL at 09:06

## 2018-06-12 RX ADMIN — WARFARIN SODIUM 3 MG: 3 TABLET ORAL at 17:40

## 2018-06-12 RX ADMIN — RANITIDINE HYDROCHLORIDE 150 MG: 150 SOLUTION ORAL at 09:06

## 2018-06-12 RX ADMIN — MIRTAZAPINE 30 MG: 7.5 TABLET ORAL at 21:45

## 2018-06-12 RX ADMIN — IPRATROPIUM BROMIDE AND ALBUTEROL SULFATE 3 ML: .5; 3 SOLUTION RESPIRATORY (INHALATION) at 20:11

## 2018-06-12 RX ADMIN — Medication 1 CAPSULE: at 09:06

## 2018-06-12 RX ADMIN — POTASSIUM CHLORIDE 20 MEQ: 20 SOLUTION ORAL at 09:06

## 2018-06-12 RX ADMIN — AMOXICILLIN 500 MG: 250 POWDER, FOR SUSPENSION ORAL at 21:46

## 2018-06-12 RX ADMIN — PREDNISOLONE ACETATE 1 DROP: 1.2 SUSPENSION/ DROPS OPHTHALMIC at 09:06

## 2018-06-12 RX ADMIN — Medication 1 CAPSULE: at 21:45

## 2018-06-12 RX ADMIN — MELATONIN 6 MG: 3 TAB ORAL at 21:46

## 2018-06-12 RX ADMIN — IPRATROPIUM BROMIDE AND ALBUTEROL SULFATE 3 ML: .5; 3 SOLUTION RESPIRATORY (INHALATION) at 08:06

## 2018-06-12 RX ADMIN — INSULIN ASPART 2 UNITS: 100 INJECTION, SOLUTION INTRAVENOUS; SUBCUTANEOUS at 17:48

## 2018-06-12 RX ADMIN — AMOXICILLIN 500 MG: 250 POWDER, FOR SUSPENSION ORAL at 09:06

## 2018-06-12 RX ADMIN — PREDNISOLONE ACETATE 1 DROP: 1.2 SUSPENSION/ DROPS OPHTHALMIC at 21:47

## 2018-06-12 RX ADMIN — INSULIN ASPART 2 UNITS: 100 INJECTION, SOLUTION INTRAVENOUS; SUBCUTANEOUS at 00:04

## 2018-06-12 RX ADMIN — INSULIN ASPART 2 UNITS: 100 INJECTION, SOLUTION INTRAVENOUS; SUBCUTANEOUS at 09:06

## 2018-06-12 RX ADMIN — FLUTICASONE PROPIONATE 1 SPRAY: 50 SPRAY, METERED NASAL at 09:06

## 2018-06-12 RX ADMIN — TORSEMIDE 10 MG: 10 TABLET ORAL at 09:06

## 2018-06-12 RX ADMIN — METOPROLOL TARTRATE 25 MG: 25 TABLET ORAL at 09:06

## 2018-06-12 RX ADMIN — METOPROLOL TARTRATE 25 MG: 25 TABLET ORAL at 00:12

## 2018-06-12 RX ADMIN — INSULIN GLARGINE 14 UNITS: 100 INJECTION, SOLUTION SUBCUTANEOUS at 21:46

## 2018-06-12 RX ADMIN — INSULIN ASPART 2 UNITS: 100 INJECTION, SOLUTION INTRAVENOUS; SUBCUTANEOUS at 04:26

## 2018-06-12 RX ADMIN — INSULIN ASPART 3 UNITS: 100 INJECTION, SOLUTION INTRAVENOUS; SUBCUTANEOUS at 12:40

## 2018-06-12 RX ADMIN — CHLORHEXIDINE GLUCONATE 15 ML: 1.2 RINSE ORAL at 21:46

## 2018-06-12 RX ADMIN — INSULIN ASPART 1 UNITS: 100 INJECTION, SOLUTION INTRAVENOUS; SUBCUTANEOUS at 21:46

## 2018-06-12 RX ADMIN — TORSEMIDE 10 MG: 10 TABLET ORAL at 17:40

## 2018-06-12 RX ADMIN — METOPROLOL TARTRATE 25 MG: 25 TABLET ORAL at 17:40

## 2018-06-12 NOTE — PROVIDER NOTIFICATION
Pt's son (Yariel) told RN that his mom (pt Jacques) should not be DNR and should be made FULL CODE.  RN asked Yariel about making her DNR last Sunday w/ Dr. Cardenas and he said he wants FULL CODE anyway.  This info was relayed to Dr. Kovacs who put in FULL CODE orders.  Yariel also stated that he is not able to have home care arranged until this Thursday morning so pt cannot discharge until then.      Pt now DNR.  Dr. Kovacs discussed this with daughter (Jen) and son (Yariel) who are in agreement.

## 2018-06-12 NOTE — PROGRESS NOTES
Pt's son/decision-maker now stating that patient should be full code.  Per ethics committee note of 6/6/18 the ethics committee has recommended the patient remain DNR/no CPR.  Agree with this recommendation that performing cpr would not be beneficial to this patient.  Code status will remain DNR.  I discussed this with her son and he is agreeable with this.

## 2018-06-12 NOTE — PROGRESS NOTES
[facility]  Comprehensive Daily ICU Note        Jacques Solis MRN# [id number]   Age: 88 year old YOB: 1930     Date of Admission: 5/21/2018    Primary care provider: Michael Mccarthy     CODE STATUS: DNR      Problem List:   Dementia possibly with superimposed encephalopathy  Acute on chronic respiratory failure, no significant chance of weaning  Afib w complication of arterial embolism  Significant bleed secondary to Warfarin   Recurrent Ileus with inability to tolerate nutrition  Diabetes         Treatment goals for next 24 hours:   1. Treat enterococcal UTI  2. Discharge planning - home vs trach facility        Subjective/ Last 24 hours:     Jacques Solis is a 88 year old female who presents with inadequate home support and possible acute on chronic hypoxic respiratory failure. She has essentially been hospitalized since September 2017 when she was admitted to [facility] for critical limb ischemia and underwent balloon angioplasty which was complicated by femoral artery perforation with hemorrhagic shock.  She was trached and pegged on 11/13/17.  She was also noted to have some encephalopathy on a baseline of Alzheimer's dementia. She has essentially moved back and forth from [place] and [facility] and then to [facility] and [facility] ever since. Her other problems include severe aortic stenosis - not a candidate for TAVR, chronic diastolic heart failure, chronic bilateral recurrent pleural effusions, chronic vent dependence, history of pulmonary embolism, and atrial fibrillation. Her most recent admission to [facility] 4/26-5/4 was due to acute blood loss anemia due to left thigh hematoma in addition to renal failure due to tobramycin toxicity for multi-drug resistant Pseudomonas.  She also underwent debridement of a right lower extremity wound due to an infected hematoma.  She was ultimately discharged to [facility], where she stayed until discharge home on 5/18/18.  Pulmonology felt that she would  "not be able to wean and recommended discharge home or to TCU for chronic vented patients, and son elected for discharge home.      Son felt he was not sent home with adequate support or resources to manage patient at home and was also concerned that she was having a respiratory decompensation. Visited her [facility] based PCP on 5/22 and requested admission to [facility].      On admission here, son reported his goals are for his mother to be able to wean from the vent for at least a few hours and be able to speak with valve and possible eat some food by mouth.  He understands that she may never be able to fully wean from the vent and is accepting of this, however, he wants any reversible conditions treated. Both son and daughter say they were hoping a \"fresh set of eyes\" might come up with different answers for their mother but both agree that nothing much is different.    Review of chart from [facility] reveals that ethics consultation was requested and that goals of care conversations were held. Per notes, no dialysis was recommended and \"partial code\" was also recommended.  Currently listed as Full Code here.  Ethics committee at [facility] concurred with decisions made by [facility] staff (DNR however family yet to be updated). .     Since admission, no significant changes have occurred other than recurrent bleed into thigh (related to Coumadin) and recurrent ileus. Care conference held on 6/7, see separate note. Discharge planning ongoing.     INTERIM HISTORY   6/9 - 6/10  -  becoming unstable for turns per nursing - becomes hypoxemic,increased WOB,  FIO2 slowly increasing ,   - some elevated peak pressures on CMV - on off PS (not trials )    6/11: Failed pressure support trial after 20 minutes today.  No other acute events.    6/12: no acute events overnight.  Family decided that they would like to discharge her to home rather than a ventilator unit.             Mechanical Ventilation/Vitalsigns/IsandOs: "   Temp:  [97.9  F (36.6  C)-98.4  F (36.9  C)] 98.1  F (36.7  C)  Heart Rate:  [70-94] 78  Resp:  [12-29] 16  BP: ()/(50-73) 125/59  FiO2 (%):  [40 %] 40 %  SpO2:  [97 %-100 %] 100 %      Intake/Output Summary (Last 24 hours) at 06/10/18 0701  Last data filed at 06/10/18 0600   Gross per 24 hour   Intake             1155 ml   Output             1235 ml   Net              -80 ml       Ventilation Mode: CPAP/PS  (Continuous positive airway pressure with Pressure Support)  FiO2 (%): 40 %  Rate Set (breaths/minute): 8 breaths/min  Tidal Volume Set (mL): 360 mL  PEEP (cm H2O): 5 cmH2O  Pressure Support (cm H2O): 15 cmH2O  Oxygen Concentration (%): 40 %  Resp: 16             Physical Examination:     General: sleepy, NAD  HEENT: tracheostomy, atraumatic  Lungs: LCTAB anteriorly  CVS: RRR  Abdomen: +BS, soft and non tender  Extremities/musculoskeletal: warm, edematous  Neurology: opens eyes to verbal stimuli. Will gesture that the blood pressure cuff bothers her.  Otherwise, will not follow basic commands.    Skin: normal  Psychiatry: calm  Exam of Line sites:  PICC looks OK            Feeding/Glucose:   Tube feeds    Blood glucose/Insulin requirement last 24 hours: sliding scale and long acting          Medications:       amoxicillin  500 mg Oral or Feeding Tube BID     chlorhexidine  15 mL Swish & Spit BID     fluticasone  1 spray Both Nostrils Daily     insulin aspart  1-6 Units Subcutaneous Q4H     insulin glargine  10 Units Subcutaneous At Bedtime     ipratropium - albuterol 0.5 mg/2.5 mg/3 mL  1 vial Nebulization TID     lactobacillus rhamnosus (GG)  1 capsule Oral or Feeding Tube BID     melatonin tablet 6 mg  6 mg Per G Tube Q24H     metoprolol tartrate (LOPRESSOR) tablet 25 mg  25 mg Oral or Feeding Tube Q8H     mirtazapine (REMERON) tablet TABS 30 mg  30 mg Oral or Feeding Tube At Bedtime     potassium chloride  20 mEq Oral Daily     prednisoLONE acetate  1 drop Both Eyes BID     rantidine  150 mg Oral  Daily     sodium chloride (PF)  10 mL Intracatheter Q7 Days     torsemide (DEMADEX) tablet 10 mg  10 mg Oral or Feeding Tube BID     warfarin  3 mg Oral ONCE at 18:00        - MEDICATION INSTRUCTIONS -       Warfarin Therapy Reminder                  Labs/Diagnostic studies:     EKG:  sinus    Echo:  No recent    ROUTINE ICU LABS (Last four results)  CMP    Recent Labs  Lab 06/12/18  0425 06/11/18  0520 06/10/18  0440 06/09/18  0450    139 139 139   POTASSIUM 3.9 3.6 3.5 3.6   CHLORIDE 97 96 97 95   CO2 36* 38* 36* 35*   ANIONGAP 5 5 6 9   * 149* 170* 168*   BUN 52* 50* 52* 57*   CR 1.13* 1.22* 1.25* 1.31*   GFRESTIMATED 45* 42* 40* 38*   GFRESTBLACK 55* 50* 49* 46*   KRISTOFER 8.0* 8.4* 8.1* 8.4*   PROTTOTAL  --  5.9*  --   --    ALBUMIN  --  1.9*  --   --    BILITOTAL  --  0.8  --   --    ALKPHOS  --  118  --   --    AST  --  29  --   --    ALT  --  21  --   --      CBC    Recent Labs  Lab 06/12/18  0425 06/11/18  0520 06/10/18  0440 06/09/18  0450   WBC 12.7* 12.0* 10.6 10.4   RBC 3.17* 3.24* 3.12* 3.32*   HGB 9.1* 9.4* 9.1* 9.5*   HCT 29.7* 30.1* 28.9* 30.8*   MCV 94 93 93 93   MCH 28.7 29.0 29.2 28.6   MCHC 30.6* 31.2* 31.5 30.8*   RDW 18.1* 18.0* 18.1* 18.0*    319 329 327     INR    Recent Labs  Lab 06/12/18  0425 06/11/18  0520 06/10/18  0440 06/09/18  0450   INR 2.76* 2.32* 2.26* 2.16*     Arterial Blood GasNo lab results found in last 7 days.    Cultures:  No results for input(s): CULT in the last 168 hours.  Blood culture:  Invalid input(s): BC   Urine culture:  No results for input(s): URC in the last 168 hours.            Imaging:     No results found for this or any previous visit (from the past 24 hour(s)).           Assessment and Plan:     Summary:  Jacques Solis is a 88 year old female with past history A-fib, CHF, severe AS, DM II, asthma, chronic respiratory failure with vent dependence who was discharged home from [facility] on 5/18/18 but returned to clinic on 5/22 and requested  hospitalization because of inability to manage respiratory status at home.    Neurology and Psychiatry:  1. Dementia with superimposed encephalopathy:  Per [facility] chart her baseline dementia prior to this prolonged illness was being oriented to self and able to answer basic questions - dementia was significant enough that it disqualified her from receiving TAVR. Was on Nemenda last year prior to hospitalization.  Valproic acid started this hospitalization for agitation. Son felt it made her too sleepy so it was discontinued.  Plan  - Continue PTA Seroquel PRN for agitation/anixety  - Continue HS Mirtazipine and Melatonin  - Monitor for signs of agitation/pain and treat as necessary     Pulmonary:   1. Acute on chronic hypoxic respiratory failure with vent dependence:  Chronic failure felt due to aortic stenosis with CHF and pleural effusions in addition to overall debility. Pulmonologist at [facility] felt she would chronically be vent dependent and weaning efforts were stopped, which I agree with as well.   - CT chest in ED showed small to moderated loculated bilateral pleural effusions with atelectasis (chronic)    Tolerating some time on pressure support (15/5) and some time on passy wilner   - family has elected to have her discharged to home.   Plan  - Continue scheduled bronchodilators.  - CMV vs PS for patient comfort - not going to be liberated   - will exchange bivona trach if son brings today (7 bivona)    Cardiovascular system:   1. Paroxysmal A-fib: Also related to lower extremity arterial clot.   2. Severe AS and chronic diastolic CHF: TTE 3/2018 with EF 55-60%, severe AS (area of 0.6-0.7) and mild regurgitation, moderate MR. Not a surgical or TAVR candidate due to advanced dementia  Plan  - continue torsemide at home BID dosing 10 mg. Aim for net even.  - resumed scheduled KCL given diuretic 20 me daily   -  have resumed anticoagulation at family request. Had a significant bleed on Warfarin so this is high  risk but son requesting it be resumed.  INR therapeutic. 2-3   - continue PTA metoprolol (note son has been dosing this at 25 mg q8h)      Renal/Electrolytes:  1. Renal - Non oliguric PAULY - resolved. Will discontinue huggins catheter today.   2. UTI - most recent culture with sensitive Klebsiella s/p treatment.  Enterococcus was not treated.  Started treatment for enterococcal UTI yesterday.  Will get 14 days.    Plan   - follow Cr, UOP, maintain hemodynamics. Continue outpatient diuretics. Aim for net even fluid balance.  - follow with huggins (placed for comfort measures and mitigate skin breakdown)      ID:   1. Kleb and enterococcus UTI as above.  Plan  - Completed a 7 day course of Ciprofloxacin.  - 14 days of amoxicillin    GI//Nutrition:  1. Aspiration Risk - Not safe to swallow. On chronic tube feeds. Intermittently unable to tolerate 2/2 ileus    2. Intermittent ileus.  Plan  - continue tube feeds as tolerated    Musculoskeletal/Rheumatology:  1. Non healing wound. 2/2 debiliation , bed ridden  Plan   - Wound cares per unit routine.     Endocrine:   1. DM. Sugars controlled on long acting and sliding scale insulin.  Sugars bit labile in setting of being on and off tube feeds requiring intermittent holds.  Lantus dose decreased   Plan  - goal sugar <180   - continue SS    Heme/Onc:  1. Recent acute blood loss anemia with thigh hematomas in context of elevated INR. Prior bleed also on AC (lovenox)(Confirmed on CT 5/31): Hgb Stable  2. Coagulopathy in setting of variable AC and nutritional status  Plan   - daily INR.       ICU prophylaxis:      DVT: Warfarin     VAP: As above     Stress ulcer: Ranitidine     Restraints needed: no     Lines   Central Line/PICC - placed unknown needed: y   Arterial line - placed n needed: n   Huggins catheter.  Not needed       Prognosis:  Poor      Family update: not yet, but will update later today

## 2018-06-12 NOTE — PROGRESS NOTES
"Palliative Care Clinical Social Work Note     D: Jacques Solis is an 88 year old woman with diagnosis of advanced dementia with intermittent encephalopathy, CHF, DM2, chronic non-healing wound due to a hematoma, PE, and chronic respiratory failure with vent dependence.  Admitted from [facility] with worsening respiratory status. Palliative Care is following for goals of care and emotional support.   I: Introduced myself to Jacques's daughter, Jen who was at bedside this morning.  Jacques is on vent and unable to participate in visit. Jen was open to introductions and shared that she feels less emotoinally distressed today than when she met with Tiffanie Tim NP yesterday, but she noted \"it's up and down these days.\"  She did not want an extended visit today saying \"if I get into all that I won't stop crying, and I'm ok right now\" but was open to me following up while her mother remains inpatient.   A: Not open to full visit, but open to future visits.  Reports that she is coping well today, but acknowledges that it's \"up and down\"  P: Will continue to be available for support.    DUSTY Lr, [facility]   Palliative Care    Pgr:[phone]  Ph: [phone]      "

## 2018-06-12 NOTE — PLAN OF CARE
Problem: Patient Care Overview  Goal: Plan of Care/Patient Progress Review  Discharge Planner PT   Patient plan for discharge: Per chart family would like to take the pt home.  Current status: Dependent for all transfers. Non ambulatory. Requires Moderate to max assist for all B LE AAROM exercises for improved range, strength and circulation. Pt more alert today, keeps eyes open, talks/chats away even though therapist is unable to determine what the pt is saying.   Barriers to return to prior living situation: Level of assist, unsure of care available for home, respiratory status  Recommendations for discharge: TCU  Rationale for recommendations: Pt will benefit from continued skilled rehab services to progress activity tolerance and mobility as able. If pt is to discharge home will need 24 hr care for all mobility and ADLs.       Entered by: Amanda Soliz 06/12/2018 2:47 PM

## 2018-06-12 NOTE — PROGRESS NOTES
Spoke with [facility] hospice medical director, Dr. Severino. He confirms that Jacques does not qualify for hospice with the ongoing use of ventilator support. Hospice would be available for family support and assistance in palliative sedation at home, if/when family reaches a decision to discontinue ventilator support.     Tiffanie REINA, [facility]  Palliative Medicine   Pager: [phone]

## 2018-06-12 NOTE — PLAN OF CARE
Problem: Patient Care Overview  Goal: Plan of Care/Patient Progress Review  Outcome: No Change  Pt on full vent support overnight via trach. Slight creamy secretions. Tolerated turns. BG remains high >200 on SSI. x1 loose BM. VSS. Slept well between cares

## 2018-06-12 NOTE — PROGRESS NOTES
[facility] Nurse Inpatient Wound Assessment     Follow up Assessment of wound(s) on pt's:   Right lateral calf        Data:   Patient History:      per MD note(s): history A-fib, CHF, severe AS, DM II, asthma, chronic respiratory failure with vent dependence who was discharged home from [facility] on 18 but did not have appropriate home services arranged, but son also reporting a recent decline in respiratory status.   Marcio Assessment and sub scores:   Marcio Score  Av.2  Min: 11  Max: 16     Positioning: Pillows,     Mattress:  Standard , Atmos Air mattress       Current Diet / Nutrition:   TF - Dietitian following    Labs:   Recent Labs   Lab Test  18   0425  18   0520   18   0130   ALBUMIN   --   1.9*   < >   --    HGB  9.1*  9.4*   < >   --    INR  2.76*  2.32*   < >   --    WBC  12.7*  12.0*   < >   --    A1C   --    --    --   6.0*    < > = values in this interval not displayed.           Wound Assessment (location):   Right, lateral, posterior calf  Wound History:  debridement    Wound Base: red, shiny  , granulation    Specific Dimensions (length x width x depth, in cm) :   8.0  x 5 x  .8cm     Tunneling:  N/A    Undermining: N/A    Palpation of the wound bed:  normal    Slough appearance:  none    Eschar appearance:  none    Periwound Skin: edema,      Color: normal and consistent with surrounding tissue    Temperature  normal     Drainage:  None noted today    Odor: none    Pain:  none              Intervention:     Patient's chart evaluated.      Assessed wounds as noted above    Discussed plan of care with Nurse and daughter             All patient / family questions answered- yes, discussed with daughter          Assessment:       Surgical debridement wound to right calf wound:  100% redder base, granulation noted wound margins, still some red bumpy tissue @ wound base, no local s/s infection, open wound edges        Plan:     Nursing to notify the  Provider(s) and re-consult the WOC Nurse if wound(s) deteriorate(s) or if the wound care plan needs reevaluation.    Wound(s) right lateral calf (daily) - Switch tx protocol to Aqua cell Ag QOD in anticipation of discharge   1. Clean wounds with MicroKlenz Spray, pat dry  2. Paint periwound tissue with No Sting Skin Prep (#[id number])) and allow to dry thoroughly  3. Apply small piece of Aqua cell Ag to wound bed.    4. Cover adaptic, 4 x 4 and secure Kerlix roll  5. Reposition pt every 1 to 2 hours when in bed and hourly when up to the chair to relieve pressure and promote perfusion to tissue.  Keep heels elevated at all times      AVS  Updated  for pending discharge    Supplied @ bedside for discharge      WOC Nurse will return: weekly and prn

## 2018-06-12 NOTE — PROGRESS NOTES
[facility] ICU RESPIRATORY NOTE  Date of Admission: 5/21/2018  Date of Intubation (most recent): Vent dependent  Reason for Mechanical Ventilation: Acute on chronic respiratory failure  Number of Days on Mechanical Ventilation: 23  Met Criteria for Pressure Support Trial: Yes  Length of Pressure Support Trial: PS 15/5 40% from 12:08pm and continuous         ABG Results:  No lab results found in last 7 days.  Ventilation Mode: CPAP/PS  (Continuous positive airway pressure with Pressure Support)  FiO2 (%): 40 %  Rate Set (breaths/minute): 8 breaths/min  Tidal Volume Set (mL): 360 mL  PEEP (cm H2O): 5 cmH2O  Pressure Support (cm H2O): 15 cmH2O  Oxygen Concentration (%): 40 %  Resp: 20     ETT appearance on chest x-ray: trach site clean, patent and secure     Plan: continue weaning trial and put full support for rest at night.    6/12/2018  Radha Johnston RRT

## 2018-06-12 NOTE — PROGRESS NOTES
IR called to say they have no opening today for pt's G-tube to be changed.  Pt scheduled for 0730 tomorrow in IR.

## 2018-06-13 ENCOUNTER — APPOINTMENT (OUTPATIENT)
Dept: GENERAL RADIOLOGY | Facility: CLINIC | Age: 83
DRG: 207 | End: 2018-06-13
Attending: RADIOLOGY
Payer: MEDICARE

## 2018-06-13 ENCOUNTER — APPOINTMENT (OUTPATIENT)
Dept: SPEECH THERAPY | Facility: CLINIC | Age: 83
DRG: 207 | End: 2018-06-13
Payer: MEDICARE

## 2018-06-13 LAB
ANION GAP SERPL CALCULATED.3IONS-SCNC: 6 MMOL/L (ref 3–14)
BUN SERPL-MCNC: 54 MG/DL (ref 7–30)
CALCIUM SERPL-MCNC: 8.5 MG/DL (ref 8.5–10.1)
CHLORIDE SERPL-SCNC: 97 MMOL/L (ref 94–109)
CO2 SERPL-SCNC: 35 MMOL/L (ref 20–32)
CREAT SERPL-MCNC: 1.07 MG/DL (ref 0.52–1.04)
ERYTHROCYTE [DISTWIDTH] IN BLOOD BY AUTOMATED COUNT: 18.3 % (ref 10–15)
GFR SERPL CREATININE-BSD FRML MDRD: 48 ML/MIN/1.7M2
GLUCOSE BLDC GLUCOMTR-MCNC: 152 MG/DL (ref 70–99)
GLUCOSE BLDC GLUCOMTR-MCNC: 155 MG/DL (ref 70–99)
GLUCOSE BLDC GLUCOMTR-MCNC: 158 MG/DL (ref 70–99)
GLUCOSE BLDC GLUCOMTR-MCNC: 160 MG/DL (ref 70–99)
GLUCOSE BLDC GLUCOMTR-MCNC: 190 MG/DL (ref 70–99)
GLUCOSE SERPL-MCNC: 136 MG/DL (ref 70–99)
HCT VFR BLD AUTO: 31.9 % (ref 35–47)
HGB BLD-MCNC: 9.8 G/DL (ref 11.7–15.7)
INR PPP: 2.26 (ref 0.86–1.14)
MCH RBC QN AUTO: 28.9 PG (ref 26.5–33)
MCHC RBC AUTO-ENTMCNC: 30.7 G/DL (ref 31.5–36.5)
MCV RBC AUTO: 94 FL (ref 78–100)
PLATELET # BLD AUTO: 369 10E9/L (ref 150–450)
POTASSIUM SERPL-SCNC: 4.1 MMOL/L (ref 3.4–5.3)
RBC # BLD AUTO: 3.39 10E12/L (ref 3.8–5.2)
SODIUM SERPL-SCNC: 138 MMOL/L (ref 133–144)
WBC # BLD AUTO: 11.1 10E9/L (ref 4–11)

## 2018-06-13 PROCEDURE — 20000003 ZZH R&B ICU

## 2018-06-13 PROCEDURE — 92526 ORAL FUNCTION THERAPY: CPT | Mod: GN | Performed by: SPEECH-LANGUAGE PATHOLOGIST

## 2018-06-13 PROCEDURE — 25000132 ZZH RX MED GY IP 250 OP 250 PS 637: Mod: GY

## 2018-06-13 PROCEDURE — A9270 NON-COVERED ITEM OR SERVICE: HCPCS | Mod: GY

## 2018-06-13 PROCEDURE — 25000132 ZZH RX MED GY IP 250 OP 250 PS 637: Mod: GY | Performed by: NURSE PRACTITIONER

## 2018-06-13 PROCEDURE — 25000132 ZZH RX MED GY IP 250 OP 250 PS 637: Mod: GY | Performed by: INTERNAL MEDICINE

## 2018-06-13 PROCEDURE — 99233 SBSQ HOSP IP/OBS HIGH 50: CPT

## 2018-06-13 PROCEDURE — A9270 NON-COVERED ITEM OR SERVICE: HCPCS | Mod: GY | Performed by: HOSPITALIST

## 2018-06-13 PROCEDURE — 80048 BASIC METABOLIC PNL TOTAL CA: CPT | Performed by: HOSPITALIST

## 2018-06-13 PROCEDURE — A9270 NON-COVERED ITEM OR SERVICE: HCPCS | Mod: GY | Performed by: INTERNAL MEDICINE

## 2018-06-13 PROCEDURE — 27210915 ZZ H TUBE GASTRO CR4

## 2018-06-13 PROCEDURE — 94640 AIRWAY INHALATION TREATMENT: CPT

## 2018-06-13 PROCEDURE — 40000225 ZZH STATISTIC SLP WARD VISIT: Performed by: SPEECH-LANGUAGE PATHOLOGIST

## 2018-06-13 PROCEDURE — 92507 TX SP LANG VOICE COMM INDIV: CPT | Mod: GN | Performed by: SPEECH-LANGUAGE PATHOLOGIST

## 2018-06-13 PROCEDURE — 85027 COMPLETE CBC AUTOMATED: CPT | Performed by: HOSPITALIST

## 2018-06-13 PROCEDURE — 25000132 ZZH RX MED GY IP 250 OP 250 PS 637: Mod: GY | Performed by: HOSPITALIST

## 2018-06-13 PROCEDURE — 25000125 ZZHC RX 250: Performed by: INTERNAL MEDICINE

## 2018-06-13 PROCEDURE — 40000239 ZZH STATISTIC VAT ROUNDS

## 2018-06-13 PROCEDURE — 00000146 ZZHCL STATISTIC GLUCOSE BY METER IP

## 2018-06-13 PROCEDURE — 40000986 XR ABDOMEN PORT 1 VW

## 2018-06-13 PROCEDURE — 27210432 ZZH NUTRITION PRODUCT RENAL BASIC LITER

## 2018-06-13 PROCEDURE — 40000275 ZZH STATISTIC RCP TIME EA 10 MIN

## 2018-06-13 PROCEDURE — 94003 VENT MGMT INPAT SUBQ DAY: CPT

## 2018-06-13 PROCEDURE — 85610 PROTHROMBIN TIME: CPT | Performed by: HOSPITALIST

## 2018-06-13 PROCEDURE — 40000008 ZZH STATISTIC AIRWAY CARE

## 2018-06-13 PROCEDURE — 25000131 ZZH RX MED GY IP 250 OP 636 PS 637: Mod: GY | Performed by: NURSE PRACTITIONER

## 2018-06-13 PROCEDURE — 25000128 H RX IP 250 OP 636: Performed by: HOSPITALIST

## 2018-06-13 PROCEDURE — A9270 NON-COVERED ITEM OR SERVICE: HCPCS | Mod: GY | Performed by: NURSE PRACTITIONER

## 2018-06-13 PROCEDURE — 94640 AIRWAY INHALATION TREATMENT: CPT | Mod: 76

## 2018-06-13 RX ORDER — WARFARIN SODIUM 4 MG/1
4 TABLET ORAL
Status: COMPLETED | OUTPATIENT
Start: 2018-06-13 | End: 2018-06-13

## 2018-06-13 RX ADMIN — CHLORHEXIDINE GLUCONATE 15 ML: 1.2 RINSE ORAL at 09:34

## 2018-06-13 RX ADMIN — CHLORHEXIDINE GLUCONATE 15 ML: 1.2 RINSE ORAL at 20:42

## 2018-06-13 RX ADMIN — INSULIN ASPART 2 UNITS: 100 INJECTION, SOLUTION INTRAVENOUS; SUBCUTANEOUS at 00:14

## 2018-06-13 RX ADMIN — METOPROLOL TARTRATE 25 MG: 25 TABLET ORAL at 17:29

## 2018-06-13 RX ADMIN — Medication 1 CAPSULE: at 20:48

## 2018-06-13 RX ADMIN — TORSEMIDE 10 MG: 10 TABLET ORAL at 09:34

## 2018-06-13 RX ADMIN — AMOXICILLIN 500 MG: 250 POWDER, FOR SUSPENSION ORAL at 20:48

## 2018-06-13 RX ADMIN — WARFARIN SODIUM 4 MG: 4 TABLET ORAL at 17:29

## 2018-06-13 RX ADMIN — INSULIN ASPART 1 UNITS: 100 INJECTION, SOLUTION INTRAVENOUS; SUBCUTANEOUS at 20:41

## 2018-06-13 RX ADMIN — IPRATROPIUM BROMIDE AND ALBUTEROL SULFATE 3 ML: .5; 3 SOLUTION RESPIRATORY (INHALATION) at 12:34

## 2018-06-13 RX ADMIN — PREDNISOLONE ACETATE 1 DROP: 1.2 SUSPENSION/ DROPS OPHTHALMIC at 09:34

## 2018-06-13 RX ADMIN — Medication: at 20:42

## 2018-06-13 RX ADMIN — POTASSIUM CHLORIDE 20 MEQ: 20 SOLUTION ORAL at 09:34

## 2018-06-13 RX ADMIN — IPRATROPIUM BROMIDE AND ALBUTEROL SULFATE 3 ML: .5; 3 SOLUTION RESPIRATORY (INHALATION) at 07:27

## 2018-06-13 RX ADMIN — METOPROLOL TARTRATE 25 MG: 25 TABLET ORAL at 09:34

## 2018-06-13 RX ADMIN — MELATONIN 6 MG: 3 TAB ORAL at 20:48

## 2018-06-13 RX ADMIN — METOPROLOL TARTRATE 25 MG: 25 TABLET ORAL at 00:14

## 2018-06-13 RX ADMIN — ONDANSETRON 4 MG: 2 INJECTION INTRAMUSCULAR; INTRAVENOUS at 17:28

## 2018-06-13 RX ADMIN — IPRATROPIUM BROMIDE AND ALBUTEROL SULFATE 3 ML: .5; 3 SOLUTION RESPIRATORY (INHALATION) at 20:01

## 2018-06-13 RX ADMIN — RANITIDINE HYDROCHLORIDE 150 MG: 150 SOLUTION ORAL at 09:34

## 2018-06-13 RX ADMIN — INSULIN GLARGINE 14 UNITS: 100 INJECTION, SOLUTION SUBCUTANEOUS at 21:02

## 2018-06-13 RX ADMIN — INSULIN ASPART 1 UNITS: 100 INJECTION, SOLUTION INTRAVENOUS; SUBCUTANEOUS at 12:21

## 2018-06-13 RX ADMIN — TORSEMIDE 10 MG: 10 TABLET ORAL at 17:29

## 2018-06-13 RX ADMIN — MIRTAZAPINE 30 MG: 7.5 TABLET ORAL at 22:40

## 2018-06-13 RX ADMIN — Medication 1 CAPSULE: at 09:34

## 2018-06-13 RX ADMIN — PREDNISOLONE ACETATE 1 DROP: 1.2 SUSPENSION/ DROPS OPHTHALMIC at 20:42

## 2018-06-13 RX ADMIN — INSULIN ASPART 1 UNITS: 100 INJECTION, SOLUTION INTRAVENOUS; SUBCUTANEOUS at 17:35

## 2018-06-13 RX ADMIN — FLUTICASONE PROPIONATE 1 SPRAY: 50 SPRAY, METERED NASAL at 09:34

## 2018-06-13 RX ADMIN — AMOXICILLIN 500 MG: 250 POWDER, FOR SUSPENSION ORAL at 09:34

## 2018-06-13 NOTE — PLAN OF CARE
Problem: Patient Care Overview  Goal: Plan of Care/Patient Progress Review  Outcome: No Change  Pt calm and cooperative, sleeping well in between cares. VSS on full vent support. NPO since midnight. Plan to G tube exchange this morning. Daughter at bedside last evening and updated on plan of care.

## 2018-06-13 NOTE — PROGRESS NOTES
Care Coordination:    Received calls x 2 from son, Yariel. They have 24 hour care lined up for patient starting Thursday. Yariel asked writer to set up ride for noon on Thursday and check to see if it is covered. Plan is for patient to go home with [facility], [facility], and [facility]. Yariel wants writer to fill out INR form further and refax (3rd time writer has done this) to [facility]. Yariel asked about Air mattress again. Advised Yariel to order it if he wanted it at the house tomorrow. Will update with ride time when it is set.  Ride set up for 1:30pm  6/14 (originally noon but Home RT is going to met at 2:00pm at home) via [employer].  Faxed INR form again to Doris.  Gave [facility] a heads up for Skilled RN, Wound RN, PT,OT.      Judie Lloyd RN BSN  Community Health Care Coordinator  Phone [phone]

## 2018-06-13 NOTE — PROGRESS NOTES
[facility]  Comprehensive Daily ICU Note        Jacques Solis MRN# [id number]   Age: 88 year old YOB: 1930     Date of Admission: 5/21/2018    Primary care provider: Michael Mccarthy     CODE STATUS: DNR      Problem List:   Dementia possibly with superimposed encephalopathy  Acute on chronic respiratory failure, no significant chance of weaning  Afib w complication of arterial embolism  Significant bleed secondary to Warfarin    Recurrent Ileus with inability to tolerate nutrition  Diabetes         Treatment goals for next 24 hours:   1. Treat enterococcal UTI  2. Discharge planning - home         Subjective/ Last 24 hours:     Jacques Solis is a 88 year old female who presents with inadequate home support and possible acute on chronic hypoxic respiratory failure. She has essentially been hospitalized since September 2017 when she was admitted to [facility] for critical limb ischemia and underwent balloon angioplasty which was complicated by femoral artery perforation with hemorrhagic shock.  She was trached and pegged on 11/13/17.  She was also noted to have some encephalopathy on a baseline of Alzheimer's dementia. She has essentially moved back and forth from [place] and [facility] and then to [facility] and [facility] ever since. Her other problems include severe aortic stenosis - not a candidate for TAVR, chronic diastolic heart failure, chronic bilateral recurrent pleural effusions, chronic vent dependence, history of pulmonary embolism, and atrial fibrillation. Her most recent admission to [facility] 4/26-5/4 was due to acute blood loss anemia due to left thigh hematoma in addition to renal failure due to tobramycin toxicity for multi-drug resistant Pseudomonas.  She also underwent debridement of a right lower extremity wound due to an infected hematoma.  She was ultimately discharged to [facility], where she stayed until discharge home on 5/18/18.  Pulmonology felt that she would not be able to  "wean and recommended discharge home or to TCU for chronic vented patients, and son elected for discharge home.      Son felt he was not sent home with adequate support or resources to manage patient at home and was also concerned that she was having a respiratory decompensation. Visited her [facility] based PCP on 5/22 and requested admission to [facility].      On admission here, son reported his goals are for his mother to be able to wean from the vent for at least a few hours and be able to speak with valve and possible eat some food by mouth.  He understands that she may never be able to fully wean from the vent and is accepting of this, however, he wants any reversible conditions treated. Both son and daughter say they were hoping a \"fresh set of eyes\" might come up with different answers for their mother but both agree that nothing much is different.    Review of chart from [facility] reveals that ethics consultation was requested and that goals of care conversations were held. Per notes, no dialysis was recommended and \"partial code\" was also recommended.  Currently listed as Full Code here.  Ethics committee at [facility] concurred with decisions made by [facility] staff (DNR however family yet to be updated). .     Since admission, no significant changes have occurred other than recurrent bleed into thigh (related to Coumadin) and recurrent ileus. Care conference held on 6/7, see separate note. Discharge planning ongoing.     INTERIM HISTORY   6/9 - 6/10  -  becoming unstable for turns per nursing - becomes hypoxemic,increased WOB,  FIO2 slowly increasing ,   - some elevated peak pressures on CMV - on off PS (not trials )    6/11: Failed pressure support trial after 20 minutes today.  No other acute events.    6/12: no acute events overnight.  Family decided that they would like to discharge her to home rather than a ventilator unit.    6/13: family refused removal of PICC line.  Refused removal of huggins " catheter.            Mechanical Ventilation/Vitalsigns/IsandOs:   Temp:  [98.1  F (36.7  C)-98.6  F (37  C)] 98.1  F (36.7  C)  Heart Rate:  [70-89] 72  Resp:  [12-36] 16  BP: (100-143)/(45-81) 130/68  FiO2 (%):  [30 %-40 %] 30 %  SpO2:  [97 %-100 %] 97 %      Intake/Output Summary (Last 24 hours) at 06/10/18 0701  Last data filed at 06/10/18 0600   Gross per 24 hour   Intake             1155 ml   Output             1235 ml   Net              -80 ml       Ventilation Mode: CPAP/PS  (Continuous positive airway pressure with Pressure Support)  FiO2 (%): 30 %  Rate Set (breaths/minute): 8 breaths/min  Tidal Volume Set (mL): 360 mL  PEEP (cm H2O): 5 cmH2O  Pressure Support (cm H2O): 12 cmH2O  Oxygen Concentration (%): 30 %  Resp: 16             Physical Examination:     General: sleepy, NAD  HEENT: tracheostomy, atraumatic  Lungs: LCTAB anteriorly  CVS: RRR  Abdomen: +BS, soft and non tender  Extremities/musculoskeletal: warm, edematous  Neurology: smiling. Moving all extremities  Skin: normal. New G tube looks good.   Psychiatry: calm  Exam of Line sites:  PICC looks OK            Feeding/Glucose:   Tube feeds    Blood glucose/Insulin requirement last 24 hours: sliding scale and long acting          Medications:       amoxicillin  500 mg Oral or Feeding Tube BID     chlorhexidine  15 mL Swish & Spit BID     fluticasone  1 spray Both Nostrils Daily     insulin aspart  1-6 Units Subcutaneous Q4H     insulin glargine  14 Units Subcutaneous At Bedtime     ipratropium - albuterol 0.5 mg/2.5 mg/3 mL  1 vial Nebulization TID     lactobacillus rhamnosus (GG)  1 capsule Oral or Feeding Tube BID     melatonin tablet 6 mg  6 mg Per G Tube Q24H     metoprolol tartrate (LOPRESSOR) tablet 25 mg  25 mg Oral or Feeding Tube Q8H     mirtazapine (REMERON) tablet TABS 30 mg  30 mg Oral or Feeding Tube At Bedtime     potassium chloride  20 mEq Oral Daily     prednisoLONE acetate  1 drop Both Eyes BID     rantidine  150 mg Oral Daily      sodium chloride (PF)  10 mL Intracatheter Q7 Days     torsemide (DEMADEX) tablet 10 mg  10 mg Oral or Feeding Tube BID     warfarin  4 mg Oral ONCE at 18:00        - MEDICATION INSTRUCTIONS -       Warfarin Therapy Reminder                  Labs/Diagnostic studies:     EKG:  sinus    Echo:  No recent    ROUTINE ICU LABS (Last four results)  CMP    Recent Labs  Lab 06/13/18 0400 06/12/18 0425 06/11/18  0520 06/10/18  0440    138 139 139   POTASSIUM 4.1 3.9 3.6 3.5   CHLORIDE 97 97 96 97   CO2 35* 36* 38* 36*   ANIONGAP 6 5 5 6   * 219* 149* 170*   BUN 54* 52* 50* 52*   CR 1.07* 1.13* 1.22* 1.25*   GFRESTIMATED 48* 45* 42* 40*   GFRESTBLACK 59* 55* 50* 49*   KRISTOFER 8.5 8.0* 8.4* 8.1*   PROTTOTAL  --   --  5.9*  --    ALBUMIN  --   --  1.9*  --    BILITOTAL  --   --  0.8  --    ALKPHOS  --   --  118  --    AST  --   --  29  --    ALT  --   --  21  --      CBC    Recent Labs  Lab 06/13/18 0400 06/12/18 0425 06/11/18  0520 06/10/18  0440   WBC 11.1* 12.7* 12.0* 10.6   RBC 3.39* 3.17* 3.24* 3.12*   HGB 9.8* 9.1* 9.4* 9.1*   HCT 31.9* 29.7* 30.1* 28.9*   MCV 94 94 93 93   MCH 28.9 28.7 29.0 29.2   MCHC 30.7* 30.6* 31.2* 31.5   RDW 18.3* 18.1* 18.0* 18.1*    344 319 329     INR    Recent Labs  Lab 06/13/18 0400 06/12/18 0425 06/11/18  0520 06/10/18  0440   INR 2.26* 2.76* 2.32* 2.26*     Arterial Blood GasNo lab results found in last 7 days.    Cultures:  No results for input(s): CULT in the last 168 hours.  Blood culture:  Invalid input(s): BC   Urine culture:  No results for input(s): URC in the last 168 hours.            Imaging:     No results found for this or any previous visit (from the past 24 hour(s)).           Assessment and Plan:     Summary:  Jacques Solis is a 88 year old female with past history A-fib, CHF, severe AS, DM II, asthma, chronic respiratory failure with vent dependence who was discharged [facility] from [facility] on 5/18/18 but returned to clinic on 5/22 and requested  hospitalization because of inability to manage respiratory status at home.    Neurology and Psychiatry:  1. Dementia with superimposed encephalopathy:  Per [facility] chart her baseline dementia prior to this prolonged illness was being oriented to self and able to answer basic questions - dementia was significant enough that it disqualified her from receiving TAVR. Was on Nemenda last year prior to hospitalization.    Plan  - Continue PTA Seroquel PRN for agitation/anixety  - Continue HS Mirtazipine and Melatonin  - Monitor for signs of agitation/pain and treat as necessary     Pulmonary:   1. Acute on chronic hypoxic respiratory failure with vent dependence:  Chronic failure felt due to aortic stenosis with CHF and pleural effusions in addition to overall debility. Pulmonologist at [facility] felt she would chronically be vent dependent and weaning efforts were stopped, which I agree with as well.   - CT chest in ED showed small to moderated loculated bilateral pleural effusions with atelectasis (chronic)    Tolerating 15/5 pressure support during the day and some time on passy wilner   - family has elected to have her discharged to home.   Plan  - Continue scheduled bronchodilators.  - CMV vs PS for patient comfort - not going to be liberated   - will exchange bivona trach if son brings it in (7 bivona)    Cardiovascular system:   1. Paroxysmal A-fib: Also related to lower extremity arterial clot.   2. Severe AS and chronic diastolic CHF: TTE 3/2018 with EF 55-60%, severe AS (area of 0.6-0.7) and mild regurgitation, moderate MR. Not a surgical or TAVR candidate due to advanced dementia  Plan  - continue torsemide at home BID dosing 10 mg. Aim for net even.  - resumed scheduled KCL given diuretic 20 me daily   -  have resumed anticoagulation at family request. Had a significant bleed on Warfarin so this is high risk but son requesting it be resumed.  INR therapeutic. 2-3   - continue PTA metoprolol (note son has been  dosing this at 25 mg q8h)      Renal/Electrolytes:  1. Renal - Non oliguric PAULY - resolved. Will discontinue huggins catheter today.   2. UTI - most recent culture with sensitive Klebsiella s/p treatment.  Enterococcus was not treated.  Started treatment for enterococcal UTI yesterday.  Will get 14 days.    Plan   - follow Cr, UOP, maintain hemodynamics. Continue outpatient diuretics. Aim for net even fluid balance.  - follow with huggins (placed for comfort measures and mitigate skin breakdown)      ID:   1. Kleb and enterococcus UTI as above.  Plan  - Completed a 7 day course of Ciprofloxacin.  - 14 days of amoxicillin    GI//Nutrition:  1. Aspiration Risk - Not safe to swallow. On chronic tube feeds. Intermittently unable to tolerate 2/2 ileus    2. Intermittent ileus.  Plan  - continue tube feeds as tolerated  - IR exchange G tube today.     Musculoskeletal/Rheumatology:  1. Non healing wound. 2/2 debiliation , bed ridden  Plan   - Wound cares per unit routine.     Endocrine:   1. DM. Sugars controlled on long acting and sliding scale insulin.   Plan  - goal sugar <180   - continue SS    Heme/Onc:  1. Recent acute blood loss anemia with thigh hematomas in context of elevated INR. Prior bleed also on AC (lovenox)(Confirmed on CT 5/31): Hgb Stable  2. Coagulopathy in setting of variable AC and nutritional status  Plan   - daily INR.       ICU prophylaxis:      DVT: Warfarin     VAP: As above     Stress ulcer: Ranitidine     Restraints needed: no     Lines   Central Line/PICC - no longer needed. Family refuses to have it removed.    Arterial line - placed n needed: n   Huggins catheter.  Family refuses to have this removed       Prognosis:  Poor

## 2018-06-13 NOTE — PROGRESS NOTES
Phone consent for gastrostomy tube exchange was obtained from sister Jen Solis after explaining the procedure, risks and benefits and consent is in IR.     Please contact the IR charge RN at [phone] for estimated time of procedure.     [facility] CIPRIANO ([phone])

## 2018-06-13 NOTE — PROGRESS NOTES
CM    I:  SW will close consult that requested SW to speak with family regarding Hospice available in [facility], should they decide this plan of care.  SW reviewed current RN CC notes which reveal family has chosen to d/c patient to home with 24 hr Vent service thru various agencies.       P:  No SW interventions anticipated.  Will be available if needs arise.    CAMMY Ambrocio

## 2018-06-13 NOTE — PLAN OF CARE
Problem: Patient Care Overview  Goal: Plan of Care/Patient Progress Review  Discharge Planner SLP   Patient plan for discharge: Patient does not know.   Current status:  Patient seen for Passy Anuel Valve (PMV) with RT present for suctioning, cuff deflation and vent settings on PS. She had difficulty at first tolerating cuff deflation due to secretions. Cuff re-inflated and suctioned again. Then deflated cuff and continued to cough, but able to bring it up and suction it orally. Able to achieve good voicing in short phrases/sentence but not as strong as past sessions. She continue to have delirum and her conversational content is not meaningful with hallucinations. Tolerated for approximately 10-15 minutes. Session ended due to fatigue and increased effort. Recommend: 1. Continue PMV trials with SLP and RT only at this time as tolerated.   Barriers to return to prior living situation: acuity of her illness.   Recommendations for discharge: To home with home health.   Rationale for recommendations: Continue ST needs at home for PMV trials and family education as indicated.        Entered by: Cely Junior 06/13/2018 2:55 PM

## 2018-06-13 NOTE — PLAN OF CARE
Problem: Patient Care Overview  Goal: Plan of Care/Patient Progress Review  Outcome: No Change  Pt able to pressure support from 0805 at 15/5 then 12/5.  Pt up to chair this afternoon, tolerated well.  PEG tube drainage gauze changed, Rt leg wound changed by WOC, now to be changed every other day.  Pt's son (Yariel) refused to have pt's PICC line or Morris removed.  Pt scheduled for IR at 0730.  Turned/repositioned q2hr, catheter care done.  Plan: d/c Thurs??

## 2018-06-13 NOTE — PROGRESS NOTES
"Care Coordination:    Received another call from son, Yareil. He wanted to know about wound care supplies, Nebs. All will be ordered at discharge. Double checked with son if [employer] was T Feeding provide, he stated \"Yes\". Called [employer] to notify of patient discharging tomorrow. Yaneth stated she was following patient and they have adequete supplies at home and  more supplies coming later this week. Updated Yaneth patient is discharging tomorrow.      Judie Lloyd RN BSN  [facility] Care Coordinator  Phone [phone]    "

## 2018-06-14 ENCOUNTER — APPOINTMENT (OUTPATIENT)
Dept: GENERAL RADIOLOGY | Facility: CLINIC | Age: 83
DRG: 207 | End: 2018-06-14
Payer: MEDICARE

## 2018-06-14 VITALS
HEIGHT: 65 IN | SYSTOLIC BLOOD PRESSURE: 126 MMHG | DIASTOLIC BLOOD PRESSURE: 59 MMHG | BODY MASS INDEX: 32.36 KG/M2 | OXYGEN SATURATION: 97 % | WEIGHT: 194.22 LBS | HEART RATE: 77 BPM | RESPIRATION RATE: 28 BRPM | TEMPERATURE: 97.3 F

## 2018-06-14 PROBLEM — I50.33 ACUTE ON CHRONIC DIASTOLIC CONGESTIVE HEART FAILURE (H): Status: ACTIVE | Noted: 2018-06-14

## 2018-06-14 PROBLEM — N39.0 URINARY TRACT INFECTION: Status: ACTIVE | Noted: 2018-06-14

## 2018-06-14 PROBLEM — E11.9 TYPE 2 DIABETES MELLITUS (H): Status: ACTIVE | Noted: 2018-06-14

## 2018-06-14 PROBLEM — S70.10XA THIGH HEMATOMA: Status: ACTIVE | Noted: 2018-06-14

## 2018-06-14 PROBLEM — F03.90 DEMENTIA (H): Status: ACTIVE | Noted: 2018-06-14

## 2018-06-14 PROBLEM — S81.809A OPEN LEG WOUND: Status: ACTIVE | Noted: 2018-06-14

## 2018-06-14 PROBLEM — K56.0 PARALYTIC ILEUS (H): Status: ACTIVE | Noted: 2018-06-14

## 2018-06-14 PROBLEM — I48.0 PAROXYSMAL ATRIAL FIBRILLATION (H): Status: ACTIVE | Noted: 2018-06-14

## 2018-06-14 PROBLEM — N17.9 ACUTE RENAL FAILURE (H): Status: ACTIVE | Noted: 2018-06-14

## 2018-06-14 PROBLEM — I35.0 AORTIC STENOSIS: Status: ACTIVE | Noted: 2018-06-14

## 2018-06-14 LAB
ANION GAP SERPL CALCULATED.3IONS-SCNC: 6 MMOL/L (ref 3–14)
BUN SERPL-MCNC: 56 MG/DL (ref 7–30)
CALCIUM SERPL-MCNC: 8.3 MG/DL (ref 8.5–10.1)
CHLORIDE SERPL-SCNC: 97 MMOL/L (ref 94–109)
CO2 SERPL-SCNC: 34 MMOL/L (ref 20–32)
CREAT SERPL-MCNC: 1.12 MG/DL (ref 0.52–1.04)
ERYTHROCYTE [DISTWIDTH] IN BLOOD BY AUTOMATED COUNT: 18.4 % (ref 10–15)
GFR SERPL CREATININE-BSD FRML MDRD: 46 ML/MIN/1.7M2
GLUCOSE BLDC GLUCOMTR-MCNC: 165 MG/DL (ref 70–99)
GLUCOSE BLDC GLUCOMTR-MCNC: 175 MG/DL (ref 70–99)
GLUCOSE BLDC GLUCOMTR-MCNC: 203 MG/DL (ref 70–99)
GLUCOSE BLDC GLUCOMTR-MCNC: 226 MG/DL (ref 70–99)
GLUCOSE SERPL-MCNC: 197 MG/DL (ref 70–99)
HCT VFR BLD AUTO: 29.9 % (ref 35–47)
HGB BLD-MCNC: 9.1 G/DL (ref 11.7–15.7)
INR PPP: 2.23 (ref 0.86–1.14)
MCH RBC QN AUTO: 28.9 PG (ref 26.5–33)
MCHC RBC AUTO-ENTMCNC: 30.4 G/DL (ref 31.5–36.5)
MCV RBC AUTO: 95 FL (ref 78–100)
PLATELET # BLD AUTO: 322 10E9/L (ref 150–450)
POTASSIUM SERPL-SCNC: 4.2 MMOL/L (ref 3.4–5.3)
RBC # BLD AUTO: 3.15 10E12/L (ref 3.8–5.2)
SODIUM SERPL-SCNC: 137 MMOL/L (ref 133–144)
WBC # BLD AUTO: 10.7 10E9/L (ref 4–11)

## 2018-06-14 PROCEDURE — G8996 SWALLOW CURRENT STATUS: HCPCS | Mod: GN | Performed by: SPEECH-LANGUAGE PATHOLOGIST

## 2018-06-14 PROCEDURE — 40000275 ZZH STATISTIC RCP TIME EA 10 MIN

## 2018-06-14 PROCEDURE — A9270 NON-COVERED ITEM OR SERVICE: HCPCS | Mod: GY

## 2018-06-14 PROCEDURE — 25000132 ZZH RX MED GY IP 250 OP 250 PS 637: Mod: GY | Performed by: INTERNAL MEDICINE

## 2018-06-14 PROCEDURE — 85027 COMPLETE CBC AUTOMATED: CPT | Performed by: HOSPITALIST

## 2018-06-14 PROCEDURE — 25000132 ZZH RX MED GY IP 250 OP 250 PS 637: Mod: GY | Performed by: NURSE PRACTITIONER

## 2018-06-14 PROCEDURE — 00000146 ZZHCL STATISTIC GLUCOSE BY METER IP

## 2018-06-14 PROCEDURE — A9270 NON-COVERED ITEM OR SERVICE: HCPCS | Mod: GY | Performed by: NURSE PRACTITIONER

## 2018-06-14 PROCEDURE — A9270 NON-COVERED ITEM OR SERVICE: HCPCS | Mod: GY | Performed by: HOSPITALIST

## 2018-06-14 PROCEDURE — 25000125 ZZHC RX 250: Performed by: INTERNAL MEDICINE

## 2018-06-14 PROCEDURE — 94003 VENT MGMT INPAT SUBQ DAY: CPT

## 2018-06-14 PROCEDURE — 25000132 ZZH RX MED GY IP 250 OP 250 PS 637: Mod: GY

## 2018-06-14 PROCEDURE — 71045 X-RAY EXAM CHEST 1 VIEW: CPT

## 2018-06-14 PROCEDURE — A9270 NON-COVERED ITEM OR SERVICE: HCPCS | Mod: GY | Performed by: INTERNAL MEDICINE

## 2018-06-14 PROCEDURE — 40000239 ZZH STATISTIC VAT ROUNDS

## 2018-06-14 PROCEDURE — 85610 PROTHROMBIN TIME: CPT | Performed by: HOSPITALIST

## 2018-06-14 PROCEDURE — 99239 HOSP IP/OBS DSCHRG MGMT >30: CPT

## 2018-06-14 PROCEDURE — 25000132 ZZH RX MED GY IP 250 OP 250 PS 637: Mod: GY | Performed by: HOSPITALIST

## 2018-06-14 PROCEDURE — 80048 BASIC METABOLIC PNL TOTAL CA: CPT | Performed by: HOSPITALIST

## 2018-06-14 PROCEDURE — 40000008 ZZH STATISTIC AIRWAY CARE

## 2018-06-14 RX ORDER — AMOXICILLIN 250 MG/5ML
500 POWDER, FOR SUSPENSION ORAL 2 TIMES DAILY
Qty: 240 ML | Refills: 0 | Status: SHIPPED | OUTPATIENT
Start: 2018-06-14 | End: 2018-06-26

## 2018-06-14 RX ORDER — WARFARIN SODIUM 3 MG/1
TABLET ORAL
Qty: 16 TABLET | Refills: 3 | Status: SHIPPED | OUTPATIENT
Start: 2018-06-14

## 2018-06-14 RX ORDER — FLUTICASONE PROPIONATE 50 MCG
1 SPRAY, SUSPENSION (ML) NASAL DAILY
Qty: 1 BOTTLE | Refills: 3 | Status: SHIPPED | OUTPATIENT
Start: 2018-06-14

## 2018-06-14 RX ORDER — METOPROLOL TARTRATE 25 MG/1
25 TABLET, FILM COATED ORAL EVERY 8 HOURS
Qty: 90 TABLET | Refills: 3 | Status: SHIPPED | OUTPATIENT
Start: 2018-06-14

## 2018-06-14 RX ORDER — BENZETHONIUM CHLORIDE
CLEANSER (ML) TOPICAL EVERY OTHER DAY
Qty: 240 ML | Refills: 3 | Status: SHIPPED | OUTPATIENT
Start: 2018-06-14

## 2018-06-14 RX ORDER — L. ACIDOPHILUS/L.BULGARICUS 1MM CELL
2 TABLET ORAL 2 TIMES DAILY
Qty: 120 TABLET | Refills: 3 | Status: SHIPPED | OUTPATIENT
Start: 2018-06-14

## 2018-06-14 RX ORDER — QUETIAPINE FUMARATE 25 MG/1
12.5 TABLET, FILM COATED ORAL 3 TIMES DAILY
Qty: 45 TABLET | Refills: 3 | Status: SHIPPED | OUTPATIENT
Start: 2018-06-14

## 2018-06-14 RX ORDER — TORSEMIDE 10 MG/1
10 TABLET ORAL 2 TIMES DAILY
Qty: 60 TABLET | Refills: 3 | Status: SHIPPED | OUTPATIENT
Start: 2018-06-14

## 2018-06-14 RX ORDER — WARFARIN SODIUM 4 MG/1
4 TABLET ORAL
Status: DISCONTINUED | OUTPATIENT
Start: 2018-06-14 | End: 2018-06-14 | Stop reason: HOSPADM

## 2018-06-14 RX ORDER — ACETAMINOPHEN 160 MG
TABLET,DISINTEGRATING ORAL 3 TIMES DAILY PRN
Qty: 118 ML | Refills: 3 | Status: SHIPPED | OUTPATIENT
Start: 2018-06-14

## 2018-06-14 RX ORDER — MIRTAZAPINE 30 MG/1
30 TABLET, FILM COATED ORAL AT BEDTIME
Qty: 60 TABLET | Refills: 3 | Status: SHIPPED | OUTPATIENT
Start: 2018-06-14

## 2018-06-14 RX ORDER — ZINC OXIDE
OINTMENT (GRAM) TOPICAL
Qty: 60 G | Refills: 3 | Status: SHIPPED | OUTPATIENT
Start: 2018-06-14

## 2018-06-14 RX ORDER — BISACODYL 10 MG
10 SUPPOSITORY, RECTAL RECTAL DAILY PRN
Qty: 30 SUPPOSITORY | Refills: 3 | Status: SHIPPED | OUTPATIENT
Start: 2018-06-14

## 2018-06-14 RX ORDER — LANOLIN ALCOHOL/MO/W.PET/CERES
6 CREAM (GRAM) TOPICAL AT BEDTIME
Qty: 60 TABLET | Refills: 3 | Status: SHIPPED | OUTPATIENT
Start: 2018-06-14

## 2018-06-14 RX ORDER — SIMETHICONE 40MG/0.6ML
40 SUSPENSION, DROPS(FINAL DOSAGE FORM)(ML) ORAL EVERY 6 HOURS PRN
Qty: 30 ML | Refills: 3 | Status: SHIPPED | OUTPATIENT
Start: 2018-06-14

## 2018-06-14 RX ORDER — POTASSIUM CHLORIDE 20MEQ/15ML
20 LIQUID (ML) ORAL DAILY
Qty: 450 ML | Refills: 0 | Status: SHIPPED | OUTPATIENT
Start: 2018-06-15

## 2018-06-14 RX ORDER — WARFARIN SODIUM 4 MG/1
TABLET ORAL
Qty: 12 TABLET | Refills: 3 | Status: SHIPPED | OUTPATIENT
Start: 2018-06-14

## 2018-06-14 RX ORDER — IPRATROPIUM BROMIDE AND ALBUTEROL SULFATE 2.5; .5 MG/3ML; MG/3ML
1 SOLUTION RESPIRATORY (INHALATION) 2 TIMES DAILY PRN
Qty: 360 ML | Refills: 11 | Status: SHIPPED | OUTPATIENT
Start: 2018-06-14

## 2018-06-14 RX ORDER — ACETAMINOPHEN 325 MG/1
650 TABLET ORAL EVERY 6 HOURS PRN
Qty: 100 TABLET | Refills: 11 | Status: SHIPPED | OUTPATIENT
Start: 2018-06-14

## 2018-06-14 RX ORDER — POLYETHYLENE GLYCOL 3350 17 G/17G
17 POWDER, FOR SOLUTION ORAL 3 TIMES DAILY PRN
Qty: 30 PACKET | Refills: 3 | Status: SHIPPED | OUTPATIENT
Start: 2018-06-14

## 2018-06-14 RX ADMIN — POTASSIUM CHLORIDE 20 MEQ: 20 SOLUTION ORAL at 09:24

## 2018-06-14 RX ADMIN — METOPROLOL TARTRATE 25 MG: 25 TABLET ORAL at 00:39

## 2018-06-14 RX ADMIN — TORSEMIDE 10 MG: 10 TABLET ORAL at 09:24

## 2018-06-14 RX ADMIN — ACETAMINOPHEN 650 MG: 160 SUSPENSION ORAL at 04:01

## 2018-06-14 RX ADMIN — METOPROLOL TARTRATE 25 MG: 25 TABLET ORAL at 09:25

## 2018-06-14 RX ADMIN — FLUTICASONE PROPIONATE 1 SPRAY: 50 SPRAY, METERED NASAL at 09:24

## 2018-06-14 RX ADMIN — Medication 1 CAPSULE: at 09:25

## 2018-06-14 RX ADMIN — INSULIN ASPART 2 UNITS: 100 INJECTION, SOLUTION INTRAVENOUS; SUBCUTANEOUS at 12:44

## 2018-06-14 RX ADMIN — IPRATROPIUM BROMIDE AND ALBUTEROL SULFATE 3 ML: .5; 3 SOLUTION RESPIRATORY (INHALATION) at 12:23

## 2018-06-14 RX ADMIN — AMOXICILLIN 500 MG: 250 POWDER, FOR SUSPENSION ORAL at 11:50

## 2018-06-14 RX ADMIN — INSULIN ASPART 2 UNITS: 100 INJECTION, SOLUTION INTRAVENOUS; SUBCUTANEOUS at 09:38

## 2018-06-14 RX ADMIN — RANITIDINE HYDROCHLORIDE 150 MG: 150 SOLUTION ORAL at 09:24

## 2018-06-14 RX ADMIN — INSULIN ASPART 1 UNITS: 100 INJECTION, SOLUTION INTRAVENOUS; SUBCUTANEOUS at 04:08

## 2018-06-14 RX ADMIN — PREDNISOLONE ACETATE 1 DROP: 1.2 SUSPENSION/ DROPS OPHTHALMIC at 09:24

## 2018-06-14 RX ADMIN — INSULIN ASPART 1 UNITS: 100 INJECTION, SOLUTION INTRAVENOUS; SUBCUTANEOUS at 00:37

## 2018-06-14 RX ADMIN — IPRATROPIUM BROMIDE AND ALBUTEROL SULFATE 3 ML: .5; 3 SOLUTION RESPIRATORY (INHALATION) at 08:09

## 2018-06-14 RX ADMIN — CHLORHEXIDINE GLUCONATE 15 ML: 1.2 RINSE ORAL at 09:24

## 2018-06-14 NOTE — DISCHARGE INSTRUCTIONS
Please follow up with your Primary Provider for further cares.     Labwork including kidney function and hemoglobin looked improved.     CT chest shows a small bilateral pleural effusion that would not require any intervention.     If your mother develops a fever or increased difficulty on the ventilator return to the ER.     Rt  Lateral calf I & D site  Wound(s) right lateral calf: change dressing on even days and prn (last changed on 18)   1. Clean wound with MicroKlenz Spray, pat dry  2. Paint periwound tissue with No Sting Skin Prep  and allow to dry thoroughly  3. Cut and apply a piece of Aqua Cell AG to wound bed  4. Cover with adaptic, 4 x 4's and secure Kerlix wrap      On  if WOC has not evaluated patient: change tx protocol to the followin. Clean wound with MicroKlenz. Pat dry  2.  Paint periwound tissue with No Sting Skin Prep  and allow to dry thoroughly  3.  Apply Iodosorb gel to adaptic and apply over wound bed  4.  Cover 4x4's and secure Kerlix warp    Alternate above tx plans every 2 weeks until eval by HHC WOCN  HHC to consult [facility] WOC to f/u on Rt lateral calf wound within  2 weeks to assess and possibly change tx protocol     TF via Gastrostomy FT - Nepro (or comparable product) at 35 mL/hr continuously x 24 hrs.  Free H20 70 mL every 4 hrs.

## 2018-06-14 NOTE — PLAN OF CARE
Problem: Patient Care Overview  Goal: Plan of Care/Patient Progress Review  Outcome: No Change  Vss, finally slept after Tylenol. Intermittent air leak around trach, sats mid 90's all night except before suctioning. Requires q1-2 hour suction

## 2018-06-14 NOTE — DISCHARGE SUMMARY
Physician Discharge Summary      Patient ID:  Jacques Solis  [id number]  88 year old  5/21/1930     Admit date: 5/21/2018      Discharge date and time: 6/14/2018       Admitting Physician: Harvinder Nolasco MD       Discharge Physician: Elvin Moeller MD      Admission Diagnoses: Chronic anticoagulation [Z79.01]  Ventilator dependence (H) [Z99.11]  Chronic renal impairment, unspecified CKD stage [N18.9]      Discharge Diagnoses:   Severe Aortic Stenosis  Diastolic Heart Failure  Chronic Ventilator Dependence  Atrial Fibrillation with Rapid Ventricular Response  Dementia  Ileus  Thigh Hematoma  Urinary Tract infection  Diabetes Mellitus 2  Acute Renal Failure  Chronic Kidney Disease      Admission Condition: poor      Discharged Condition: poor      Indication for Admission: Ventilator Dependence with worsening dyspnea      Hospital Course: Ms Solis had a long hospital admission stemming from her chronic illness.  She previously had been admitted at [facility] and was discharged to [facility]. She was eventually discharged to home. However, while at home her son and daughter felt that her breathing was getting worse and they were not equipped to help her at home. Therefore she was admitted here.        On admission, there were not any acute medical issues that were identified to explain worsening at home.  Dispo planning was started, but then some of her chronic medical issues began to become problematic.  She went into A fib with RVR, and required pushes of IV metoprolol.  She developed acute renal failure, a supratherapeutic INR and acute blood loss anemia with a large thigh hematoma.  She also had an ileus at various points during the hospitalization.  A urine culture grew Klebsiella and Enterococcus.  The Klebsiella was initially treated with cipro.  The Enterococcus was not treated at first, but she did eventually develop a leukocytosis and so treatment was started with amoxicillin.       She has chronic  wounds on her legs which required daily wound care. Addtionally she had skin breakdown in her perineal region and so had a huggins catheter throughout the admission.        Regarding her respiratory failure, she did not show any substantial signs that she would be able to wean from the ventilator.  Pressure support trial were used, but she could not tolerate low pressures.  It was felt that her respiratory failure was due to congestive heart failure with pulmonary edema and pleural effusions, along with generalized muscle weakness.  This in combination with her dementia made it hard to wean from the ventilator, and I suspect she will be ventilator dependent for the rest of her life.        The ethics committee was consulted regarding her code status. Given her severe aortic stenosis, and her many other medical comorbidities, both the ethics committee and the physicians taking care of her during this stay felt that cardiac resuscitation would not meaningfully prolong her quality of life and would potentially lead to suffering.       She was eventually restarted on coumadin.  The family wanted it restarted despite discussing the bleeding risks associated with this.  She will have weekly INR checks with a home INR machine.  These will be sent to her primary care physician, Dr. Levy.      Her PICC line and huggins were removed the day of discharge.  Her bivona trach was exchanged on the day of discharge.           Consults:  Palliative care  Nutrition      Significant Diagnostic Studies:   Chest CT scan 5/21:  FINDINGS: A tracheostomy tube is present with distal tube tip in the  mid trachea. Small to moderate-sized bilateral loculated-appearing  pleural effusions. Atelectasis is present in the lungs adjacent to the  effusions. 4.3 x 2.1 cm patchy opacity in the posterior aspect of the  left upper lobe (series 3 image 17). The lungs are otherwise clear. A  very small amount of pericardial fluid. No enlarged lymph nodes in  the  chest. Mild cardiomegaly. Atherosclerotic calcification in the  thoracic aorta and coronary arteries. Right upper extremity central  venous catheter with distal catheter tip in the superior vena cava.      Scan through the upper abdomen is significant for nonspecific diffuse  thickening of bilateral adrenal glands and a percutaneous gastric tube  with balloon tip in the gastric body.          IMPRESSION:   1. Small to moderate-sized loculated-appearing bilateral pleural  effusions with adjacent atelectasis.  2. A 4 cm patchy opacity in the left upper lobe is nonspecific, with  possibilities including pneumonia and scarring. Comparison with prior  chest would be useful, if available. If not available, a follow-up CT  could be considered in 3 months to confirm resolution of this finding.      Chest Abdomen pelvis CT scan 5/30:  FINDINGS: There are small to moderate-sized bilateral pleural  effusions. No ascites. No free intraperitoneal air or bowel  obstruction. Gastrostomy tube is noted. There are calcified  gallstones. Within the limits of an unenhanced study, the liver,  pancreas, spleen, and right adrenal gland are unremarkable. There is  nodular thickening of the left adrenal gland, measuring up to 1.5 cm.  Density of this nodular thickening compatible with adenoma. There is  no hydronephrosis. Small left renal cysts are noted. Dense coronary  and aortic atherosclerosis is present. No abdominal aortic aneurysm.  No retroperitoneal hematoma. Nodular densities in the subcutaneous fat  of the lower anterior abdominal wall are likely related to  subcutaneous injections. No abdominal or pelvic adenopathy. The bones  are osteopenic.          IMPRESSION:  1. No cause for acute blood loss anemia demonstrated.  2. There are small to moderate-sized bilateral pleural effusions.  3. Cholelithiasis.  4. Severe atherosclerosis.  5. Left adrenal adenoma.      Thigh CT scan  FINDINGS: There are large hematomas in the medial  proximal thigh  bilaterally. There is high density material in these regions  consistent with blood. On the right there is a small fluid fluid  level.      On the right the blood appears both intramuscular in the adductor  musculature of the hip as well as extending somewhat subcutaneously.  This hematoma measures approximately 13.7 x 5.4 x 6.9 cm in size.      On the left the proximal medial thigh hematoma measures approximately  9.6 x 5.9 x 12.4 cm. This appears to be partially intramuscular and  extend into the subcutaneous tissues as well in the region of the  adductors.      There is diffuse subcutaneous edema and diffuse muscle atrophy. No  evidence of acute fracture. There are vascular calcifications typical  of a diabetic patient.          IMPRESSION: Large bilateral proximal medial thigh hematomas as  described above.        Discharge Exam:  Gen: NAD, sleepy.  Not answering questions appropriately  HEENT: Trach site looks good  CV: RRR. 3/6 murmur  Resp: Rhonchi. Decreased at bases bilateral  GI: G tube looks good.  Non tender  Ext: Warm, 1+ edema. Wound is wrapped  Neuro: Lethargic, moving all extremities.      Disposition: home     Patient Instructions:         Current Discharge Medication List             START taking these medications     Details   amoxicillin (AMOXIL) 250 MG/5ML suspension 10 mLs (500 mg) by Per G Tube route 2 times daily for 12 days  Qty: 240 mL, Refills: 0     Associated Diagnoses: Urinary tract infection associated with catheterization of urinary tract, unspecified indwelling urinary catheter type, initial encounter (H)       blood glucose (NO BRAND SPECIFIED) lancets standard Use to test blood sugar 3 times daily or as directed.  Qty: 100 each, Refills: 11     Associated Diagnoses: Diabetes mellitus without complication (H)       blood glucose calibration (NO BRAND SPECIFIED) solution Use to calibrate blood glucose monitor as directed.  Qty: 1 each, Refills: 0     Associated  Diagnoses: Diabetes mellitus without complication (H)       blood glucose monitoring (NO BRAND SPECIFIED) meter device kit Use to test blood sugar 3 times daily or as directed.  Qty: 1 kit, Refills: 11     Associated Diagnoses: Diabetes mellitus without complication (H)       blood glucose monitoring (NO BRAND SPECIFIED) test strip Use to test blood sugars 3 times daily or as directed  Qty: 100 strip, Refills: 11     Associated Diagnoses: Diabetes mellitus without complication (H)       Cadexomer Iodine, topical, 0.9% (IODOSORB) 0.9 % GEL gel Apply topically every other day Use with wound changes  Qty: 10 g, Refills: 3     Associated Diagnoses: Encounter for wound care       hydrogen peroxide 3 % solution Apply topically 3 times daily as needed for wound care (tracheostomy care)  Qty: 118 mL, Refills: 3     Associated Diagnoses: Ventilator dependence (H)       insulin aspart (NOVOLOG PEN) 100 UNIT/ML injection Inject 1-6 Units Subcutaneous every 4 hours  Qty: 10 mL, Refills: 11     Associated Diagnoses: Diabetes mellitus without complication (H)       polyethylene glycol (MIRALAX/GLYCOLAX) Packet 17 g by Oral or Feeding Tube route 3 times daily as needed for constipation  Qty: 30 packet, Refills: 3     Associated Diagnoses: Constipation, unspecified constipation type       potassium chloride (KAYCIEL) 20 MEQ/15ML (10%) solution 15 mLs (20 mEq) by Per G Tube route daily  Qty: 450 mL, Refills: 0     Associated Diagnoses: Hypokalemia       prednisoLONE acetate (PRED MILD) 0.12 % ophthalmic susp Place 1 drop into both eyes 2 times daily  Qty: 5 mL, Refills: 3     Associated Diagnoses: Dry eyes       ranitidine (ZANTAC) 150 MG/10ML syrup 10 mLs (150 mg) by Per G Tube route daily  Qty: 473 mL, Refills: 3     Associated Diagnoses: Gastroesophageal reflux disease, esophagitis presence not specified       simethicone (MYLICON) 40 MG/0.6ML suspension 0.6 mLs (40 mg) by Per G Tube route every 6 hours as needed for  cramping  Qty: 30 mL, Refills: 3     Associated Diagnoses: Abdominal cramping       !! warfarin (COUMADIN) 3 MG tablet Take every Monday, Wednesday, Friday and Sunday  Qty: 16 tablet, Refills: 3     Associated Diagnoses: Paroxysmal atrial fibrillation (H)       !! warfarin (COUMADIN) 4 MG tablet Take every Tuesday, Thursday, Saturday  Qty: 12 tablet, Refills: 3     Associated Diagnoses: Paroxysmal atrial fibrillation (H)       Wound Cleansers (MICROKLENZ WOUND CLEANSER) LIQD Externally apply topically every other day With wound care  Qty: 240 mL, Refills: 3     Associated Diagnoses: Encounter for wound care       zinc oxide (DESITIN) 40 % ointment Apply topically every hour as needed for irritation or skin protection Apply to perineal region  Qty: 60 g, Refills: 3     Associated Diagnoses: Skin irritation        !! - Potential duplicate medications found. Please discuss with provider.             CONTINUE these medications which have CHANGED     Details   acetaminophen (TYLENOL) 325 MG tablet 2 tablets (650 mg) by Per G Tube route every 6 hours as needed for mild pain  Qty: 100 tablet, Refills: 11     Associated Diagnoses: Other chronic pain       bisacodyl (DULCOLAX) 10 MG Suppository Place 1 suppository (10 mg) rectally daily as needed for constipation  Qty: 30 suppository, Refills: 3     Associated Diagnoses: Constipation, unspecified constipation type       fluticasone (FLONASE) 50 MCG/ACT spray Spray 1 spray into both nostrils daily  Qty: 1 Bottle, Refills: 3     Associated Diagnoses: Chronic rhinitis, unspecified type       insulin glargine (LANTUS SOLOSTAR) 100 UNIT/ML pen Inject 14 Units Subcutaneous At Bedtime  Qty: 5 mL, Refills: 11     Associated Diagnoses: Diabetes mellitus without complication (H)       ipratropium - albuterol 0.5 mg/2.5 mg/3 mL (DUONEB) 0.5-2.5 (3) MG/3ML neb solution Take 1 vial (3 mLs) by nebulization 2 times daily as needed for wheezing  Qty: 360 mL, Refills: 11     Associated  Diagnoses: Ventilator dependence (H)       lactobacillus TABS 2 tablets by Per G Tube route 2 times daily  Qty: 120 tablet, Refills: 3     Associated Diagnoses: Constipation, unspecified constipation type       melatonin 3 MG tablet 2 tablets (6 mg) by Per G Tube route At Bedtime  Qty: 60 tablet, Refills: 3     Associated Diagnoses: Insomnia, unspecified type       metoprolol tartrate (LOPRESSOR) 25 MG tablet 1 tablet (25 mg) by Per G Tube route every 8 hours  Qty: 90 tablet, Refills: 3     Associated Diagnoses: Paroxysmal atrial fibrillation (H)       mirtazapine (REMERON) 30 MG tablet 1 tablet (30 mg) by Oral or Feeding Tube route At Bedtime  Qty: 60 tablet, Refills: 3     Associated Diagnoses: Depression, unspecified depression type       QUEtiapine (SEROQUEL) 25 MG tablet 0.5 tablets (12.5 mg) by Per G Tube route 3 times daily  Qty: 45 tablet, Refills: 3     Associated Diagnoses: Delirium       sennosides (SENOKOT) 8.8 MG/5ML syrup 5 mLs by Per G Tube route daily as needed for constipation  Qty: 105 mL, Refills: 3     Associated Diagnoses: Constipation, unspecified constipation type       torsemide (DEMADEX) 10 MG tablet 1 tablet (10 mg) by Per G Tube route 2 times daily  Qty: 60 tablet, Refills: 3     Associated Diagnoses: Chronic renal impairment, unspecified CKD stage                    STOP taking these medications         bacitracin 500 UNIT/GM OINT Comments:   Reason for Stopping:            chlorhexidine (PERIDEX) 0.12 % solution Comments:   Reason for Stopping:            Emollient (HYDROPHOR) OINT Comments:   Reason for Stopping:            insulin lispro (HUMALOG KWIKPEN) 100 UNIT/ML injection Comments:   Reason for Stopping:            MetOLazone (ZAROXOLYN PO) Comments:   Reason for Stopping:            SIMETHICONE-80 PO Comments:   Reason for Stopping:                 Activity: range of motion activities in bed.    Diet: see discharge instructions for tube feeds  Wound Care: see discharge  instructions for wound care     Follow-up in 1 month within one month with your primary care physician.      Signed:  Elvin Moeller  6/14/2018  11:59 AM    I spent > 30 minutes preparing for Ms Solis's discharge

## 2018-06-14 NOTE — PLAN OF CARE
Problem: Patient Care Overview  Goal: Plan of Care/Patient Progress Review  Outcome: Adequate for Discharge Date Met: 06/14/18  EMS arrived to transport pt home.  Morris and PICC line removed, pressure held on Rt arm after PICC removal, site soft, no signs of hematoma, 2x2 sterile gauze and tegaderm applied, pt had small amount of urine on pad and BM smear before d/c, pt cleaned.  Tube feed disconnected and flushed w/ 70cc water.  Trach dressing changed by Dr. Villa.  AVS printed out and given to daughter Jen who signed for pt.  Jen to  d/c meds at d/c pharmacy.

## 2018-06-14 NOTE — PLAN OF CARE
Problem: Patient Care Overview  Goal: Plan of Care/Patient Progress Review  Physical Therapy Discharge Summary    Reason for therapy discharge:    Discharged to home with home therapy.    Progress towards therapy goal(s). See goals on Care Plan in [facility] electronic health record for goal details.  Goals not met.  Barriers to achieving goals:   discharge from facility.    Therapy recommendation(s):    Continued therapy is recommended.  Rationale/Recommendations:  Rec cont PT intervention to achieve max functional recovery and decrease caregiver burden.

## 2018-06-14 NOTE — PROGRESS NOTES
[facility]  Met with pt and daughter Jen to discuss plans for HC. Patient did not participate in the conversation. Pt to be discharged home today to her son Yariel's house with 24/7 private pay nursing care to manage/monitor vent, as well as private duty CNA care. Yariel and Jen have agreed to have [facility] follow with services of SN, PT, OT and Speech therapy.  Patient care support center processing referral.  Jen verbalized understanding that initial visit is scheduled for 6/15/18 or 6/16/18.  Jen has 24 hour phone number for [facility] for any questions or concerns.

## 2018-06-14 NOTE — PLAN OF CARE
Problem: Patient Care Overview  Goal: Plan of Care/Patient Progress Review  Outcome: No Change  Pt pressure supported today for about 7 hours, up in chair this afternoon then when lifted back to chair got SOB and put back on CMV.  Pt had big mucus plug at 1745, pt's secretions are becoming more frequent and thicker, more frequent suction required.  Morris patent w/ 30-74cc UOP/hr, catheter care done.  Large BM x2 today.  PCA at bedside.  Tube feed restarted after IR changed PEG tube at bedside.  Denies pain.  Plan: d/c home w/ home nursing care tomorrow at 1330.

## 2018-06-14 NOTE — PROGRESS NOTES
Some air leakage around trach, attempted to reposition vent tubing arm to decrease tension on trach to no avail. O2 sats mid 90's, secretions mod. White-yellowRT called to assess

## 2018-06-14 NOTE — PROGRESS NOTES
Pt with significant leak in trach with variable Vte. Added sterile water up to minimal occluding volume. Vte returned to normal.     6/14/2018  Radha Johnston RRT

## 2018-06-14 NOTE — DISCHARGE SUMMARY
Physician Discharge Summary     Patient ID:  Jacques Solis  [id number]  88 year old  5/21/1930    Admit date: 5/21/2018     Discharge date and time: 6/14/2018      Admitting Physician: Harvinder Nolasco MD      Discharge Physician: Elvin Moeller MD     Admission Diagnoses: Chronic anticoagulation [Z79.01]  Ventilator dependence (H) [Z99.11]  Chronic renal impairment, unspecified CKD stage [N18.9]     Discharge Diagnoses:   Severe Aortic Stenosis  Diastolic Heart Failure  Chronic Ventilator Dependence  Atrial Fibrillation with Rapid Ventricular Response  Dementia  Ileus  Thigh Hematoma  Urinary Tract infection  Diabetes Mellitus 2  Acute Renal Failure  Chronic Kidney Disease     Admission Condition: poor     Discharged Condition: poor     Indication for Admission: Ventilator Dependence with worsening dyspnea     Hospital Course: Ms Solis had a long hospital admission stemming from her chronic illness.  She previously had been admitted at [facility] and was discharged to [facility]. She was eventually discharged to home. However, while at home her son and daughter felt that her breathing was getting worse and they were not equipped to help her at home. Therefore she was admitted here.       On admission, there were not any acute medical issues that were identified to explain worsening at home.  Dispo planning was started, but then some of her chronic medical issues began to become problematic.  She went into A fib with RVR, and required pushes of IV metoprolol.  She developed acute renal failure, a supratherapeutic INR and acute blood loss anemia with a large thigh hematoma.  She also had an ileus at various points during the hospitalization.  A urine culture grew Klebsiella and Enterococcus.  The Klebsiella was initially treated with cipro.  The Enterococcus was not treated at first, but she did eventually develop a leukocytosis and so treatment was started with amoxicillin.      She has chronic wounds on  her legs which required daily wound care. Addtionally she had skin breakdown in her perineal region and so had a huggins catheter throughout the admission.       Regarding her respiratory failure, she did not show any substantial signs that she would be able to wean from the ventilator.  Pressure support trial were used, but she could not tolerate low pressures.  It was felt that her respiratory failure was due to congestive heart failure with pulmonary edema and pleural effusions, along with generalized muscle weakness.  This in combination with her dementia made it hard to wean from the ventilator, and I suspect she will be ventilator dependent for the rest of her life.       The ethics committee was consulted regarding her code status. Given her severe aortic stenosis, and her many other medical comorbidities, both the ethics committee and the physicians taking care of her during this stay felt that cardiac resuscitation would not meaningfully prolong her quality of life and would potentially lead to suffering.      She was eventually restarted on coumadin.  The family wanted it restarted despite discussing the bleeding risks associated with this.  She will have weekly INR checks with a home INR machine.  These will be sent to her primary care physician, Dr. Levy.     Her PICC line and huggins were removed the day of discharge.  Her bivona trach was exchanged on the day of discharge.         Consults:  Palliative care  Nutrition     Significant Diagnostic Studies:   Chest CT scan 5/21:  FINDINGS: A tracheostomy tube is present with distal tube tip in the  mid trachea. Small to moderate-sized bilateral loculated-appearing  pleural effusions. Atelectasis is present in the lungs adjacent to the  effusions. 4.3 x 2.1 cm patchy opacity in the posterior aspect of the  left upper lobe (series 3 image 17). The lungs are otherwise clear. A  very small amount of pericardial fluid. No enlarged lymph nodes in the  chest. Mild  cardiomegaly. Atherosclerotic calcification in the  thoracic aorta and coronary arteries. Right upper extremity central  venous catheter with distal catheter tip in the superior vena cava.      Scan through the upper abdomen is significant for nonspecific diffuse  thickening of bilateral adrenal glands and a percutaneous gastric tube  with balloon tip in the gastric body.          IMPRESSION:   1. Small to moderate-sized loculated-appearing bilateral pleural  effusions with adjacent atelectasis.  2. A 4 cm patchy opacity in the left upper lobe is nonspecific, with  possibilities including pneumonia and scarring. Comparison with prior  chest would be useful, if available. If not available, a follow-up CT  could be considered in 3 months to confirm resolution of this finding.     Chest Abdomen pelvis CT scan 5/30:  FINDINGS: There are small to moderate-sized bilateral pleural  effusions. No ascites. No free intraperitoneal air or bowel  obstruction. Gastrostomy tube is noted. There are calcified  gallstones. Within the limits of an unenhanced study, the liver,  pancreas, spleen, and right adrenal gland are unremarkable. There is  nodular thickening of the left adrenal gland, measuring up to 1.5 cm.  Density of this nodular thickening compatible with adenoma. There is  no hydronephrosis. Small left renal cysts are noted. Dense coronary  and aortic atherosclerosis is present. No abdominal aortic aneurysm.  No retroperitoneal hematoma. Nodular densities in the subcutaneous fat  of the lower anterior abdominal wall are likely related to  subcutaneous injections. No abdominal or pelvic adenopathy. The bones  are osteopenic.          IMPRESSION:  1. No cause for acute blood loss anemia demonstrated.  2. There are small to moderate-sized bilateral pleural effusions.  3. Cholelithiasis.  4. Severe atherosclerosis.  5. Left adrenal adenoma.     Thigh CT scan  FINDINGS: There are large hematomas in the medial proximal  thigh  bilaterally. There is high density material in these regions  consistent with blood. On the right there is a small fluid fluid  level.      On the right the blood appears both intramuscular in the adductor  musculature of the hip as well as extending somewhat subcutaneously.  This hematoma measures approximately 13.7 x 5.4 x 6.9 cm in size.      On the left the proximal medial thigh hematoma measures approximately  9.6 x 5.9 x 12.4 cm. This appears to be partially intramuscular and  extend into the subcutaneous tissues as well in the region of the  adductors.      There is diffuse subcutaneous edema and diffuse muscle atrophy. No  evidence of acute fracture. There are vascular calcifications typical  of a diabetic patient.          IMPRESSION: Large bilateral proximal medial thigh hematomas as  described above.      Discharge Exam:  Gen: NAD, sleepy.  Not answering questions appropriately  HEENT: Trach site looks good  CV: RRR. 3/6 murmur  Resp: Rhonchi. Decreased at bases bilateral  GI: G tube looks good.  Non tender  Ext: Warm, 1+ edema. Wound is wrapped  Neuro: Lethargic, moving all extremities.     Disposition: home    Patient Instructions:   Current Discharge Medication List      START taking these medications    Details   amoxicillin (AMOXIL) 250 MG/5ML suspension 10 mLs (500 mg) by Per G Tube route 2 times daily for 12 days  Qty: 240 mL, Refills: 0    Associated Diagnoses: Urinary tract infection associated with catheterization of urinary tract, unspecified indwelling urinary catheter type, initial encounter (H)      blood glucose (NO BRAND SPECIFIED) lancets standard Use to test blood sugar 3 times daily or as directed.  Qty: 100 each, Refills: 11    Associated Diagnoses: Diabetes mellitus without complication (H)      blood glucose calibration (NO BRAND SPECIFIED) solution Use to calibrate blood glucose monitor as directed.  Qty: 1 each, Refills: 0    Associated Diagnoses: Diabetes mellitus without  complication (H)      blood glucose monitoring (NO BRAND SPECIFIED) meter device kit Use to test blood sugar 3 times daily or as directed.  Qty: 1 kit, Refills: 11    Associated Diagnoses: Diabetes mellitus without complication (H)      blood glucose monitoring (NO BRAND SPECIFIED) test strip Use to test blood sugars 3 times daily or as directed  Qty: 100 strip, Refills: 11    Associated Diagnoses: Diabetes mellitus without complication (H)      Cadexomer Iodine, topical, 0.9% (IODOSORB) 0.9 % GEL gel Apply topically every other day Use with wound changes  Qty: 10 g, Refills: 3    Associated Diagnoses: Encounter for wound care      hydrogen peroxide 3 % solution Apply topically 3 times daily as needed for wound care (tracheostomy care)  Qty: 118 mL, Refills: 3    Associated Diagnoses: Ventilator dependence (H)      insulin aspart (NOVOLOG PEN) 100 UNIT/ML injection Inject 1-6 Units Subcutaneous every 4 hours  Qty: 10 mL, Refills: 11    Associated Diagnoses: Diabetes mellitus without complication (H)      polyethylene glycol (MIRALAX/GLYCOLAX) Packet 17 g by Oral or Feeding Tube route 3 times daily as needed for constipation  Qty: 30 packet, Refills: 3    Associated Diagnoses: Constipation, unspecified constipation type      potassium chloride (KAYCIEL) 20 MEQ/15ML (10%) solution 15 mLs (20 mEq) by Per G Tube route daily  Qty: 450 mL, Refills: 0    Associated Diagnoses: Hypokalemia      prednisoLONE acetate (PRED MILD) 0.12 % ophthalmic susp Place 1 drop into both eyes 2 times daily  Qty: 5 mL, Refills: 3    Associated Diagnoses: Dry eyes      ranitidine (ZANTAC) 150 MG/10ML syrup 10 mLs (150 mg) by Per G Tube route daily  Qty: 473 mL, Refills: 3    Associated Diagnoses: Gastroesophageal reflux disease, esophagitis presence not specified      simethicone (MYLICON) 40 MG/0.6ML suspension 0.6 mLs (40 mg) by Per G Tube route every 6 hours as needed for cramping  Qty: 30 mL, Refills: 3    Associated Diagnoses: Abdominal  cramping      !! warfarin (COUMADIN) 3 MG tablet Take every Monday, Wednesday, Friday and Sunday  Qty: 16 tablet, Refills: 3    Associated Diagnoses: Paroxysmal atrial fibrillation (H)      !! warfarin (COUMADIN) 4 MG tablet Take every Tuesday, Thursday, Saturday  Qty: 12 tablet, Refills: 3    Associated Diagnoses: Paroxysmal atrial fibrillation (H)      Wound Cleansers (MICROKLENZ WOUND CLEANSER) LIQD Externally apply topically every other day With wound care  Qty: 240 mL, Refills: 3    Associated Diagnoses: Encounter for wound care      zinc oxide (DESITIN) 40 % ointment Apply topically every hour as needed for irritation or skin protection Apply to perineal region  Qty: 60 g, Refills: 3    Associated Diagnoses: Skin irritation       !! - Potential duplicate medications found. Please discuss with provider.      CONTINUE these medications which have CHANGED    Details   acetaminophen (TYLENOL) 325 MG tablet 2 tablets (650 mg) by Per G Tube route every 6 hours as needed for mild pain  Qty: 100 tablet, Refills: 11    Associated Diagnoses: Other chronic pain      bisacodyl (DULCOLAX) 10 MG Suppository Place 1 suppository (10 mg) rectally daily as needed for constipation  Qty: 30 suppository, Refills: 3    Associated Diagnoses: Constipation, unspecified constipation type      fluticasone (FLONASE) 50 MCG/ACT spray Spray 1 spray into both nostrils daily  Qty: 1 Bottle, Refills: 3    Associated Diagnoses: Chronic rhinitis, unspecified type      insulin glargine (LANTUS SOLOSTAR) 100 UNIT/ML pen Inject 14 Units Subcutaneous At Bedtime  Qty: 5 mL, Refills: 11    Associated Diagnoses: Diabetes mellitus without complication (H)      ipratropium - albuterol 0.5 mg/2.5 mg/3 mL (DUONEB) 0.5-2.5 (3) MG/3ML neb solution Take 1 vial (3 mLs) by nebulization 2 times daily as needed for wheezing  Qty: 360 mL, Refills: 11    Associated Diagnoses: Ventilator dependence (H)      lactobacillus TABS 2 tablets by Per G Tube route 2 times  daily  Qty: 120 tablet, Refills: 3    Associated Diagnoses: Constipation, unspecified constipation type      melatonin 3 MG tablet 2 tablets (6 mg) by Per G Tube route At Bedtime  Qty: 60 tablet, Refills: 3    Associated Diagnoses: Insomnia, unspecified type      metoprolol tartrate (LOPRESSOR) 25 MG tablet 1 tablet (25 mg) by Per G Tube route every 8 hours  Qty: 90 tablet, Refills: 3    Associated Diagnoses: Paroxysmal atrial fibrillation (H)      mirtazapine (REMERON) 30 MG tablet 1 tablet (30 mg) by Oral or Feeding Tube route At Bedtime  Qty: 60 tablet, Refills: 3    Associated Diagnoses: Depression, unspecified depression type      QUEtiapine (SEROQUEL) 25 MG tablet 0.5 tablets (12.5 mg) by Per G Tube route 3 times daily  Qty: 45 tablet, Refills: 3    Associated Diagnoses: Delirium      sennosides (SENOKOT) 8.8 MG/5ML syrup 5 mLs by Per G Tube route daily as needed for constipation  Qty: 105 mL, Refills: 3    Associated Diagnoses: Constipation, unspecified constipation type      torsemide (DEMADEX) 10 MG tablet 1 tablet (10 mg) by Per G Tube route 2 times daily  Qty: 60 tablet, Refills: 3    Associated Diagnoses: Chronic renal impairment, unspecified CKD stage            STOP taking these medications       bacitracin 500 UNIT/GM OINT Comments:   Reason for Stopping:         chlorhexidine (PERIDEX) 0.12 % solution Comments:   Reason for Stopping:         Emollient (HYDROPHOR) OINT Comments:   Reason for Stopping:         insulin lispro (HUMALOG KWIKPEN) 100 UNIT/ML injection Comments:   Reason for Stopping:         MetOLazone (ZAROXOLYN PO) Comments:   Reason for Stopping:         SIMETHICONE-80 PO Comments:   Reason for Stopping:             Activity: range of motion activities in bed.    Diet: see discharge instructions for tube feeds  Wound Care: see discharge instructions for wound care    Follow-up in 1 month within one month with your primary care physician.     Signed:  Elvin KAHN  Sajan  6/14/2018  11:59 AM

## 2018-06-14 NOTE — PLAN OF CARE
Problem: Patient Care Overview  Goal: Plan of Care/Patient Progress Review  Outcome: No Change  Neuro: Patient responds to name and nods appropriately at times but appears to be confused when mouthing words, unchanged from baseline. Follows some commands.  Resp: Stable on current vent settings, moderate amount of secretions  CV: BP and HR stable  GI: 2 loose stools  : Good UO

## 2018-06-15 NOTE — PROGRESS NOTES
Speech Language Therapy Discharge Summary    Reason for therapy discharge:    Discharged to home with home therapy.    Progress towards therapy goal(s). See goals on Care Plan in [facility] electronic health record for goal details.  Goals not met.  Barriers to achieving goals:   limited tolerance for therapy, discharge from facility.    Therapy recommendation(s):    Continued therapy is recommended.  Rationale/Recommendations:  Recommended consideration for home SLP services targeting use of inline Passy Anuel speaking valve to improve communication function and secretion mangement. .

## 2018-06-20 LAB — POTASSIUM SERPL-SCNC: 4.5 MMOL/L (ref 3.4–5.3)

## 2018-06-20 PROCEDURE — 84132 ASSAY OF SERUM POTASSIUM: CPT | Performed by: INTERNAL MEDICINE

## 2018-06-22 LAB
ANION GAP SERPL CALCULATED.3IONS-SCNC: 6 MMOL/L (ref 3–14)
BUN SERPL-MCNC: 55 MG/DL (ref 7–30)
CALCIUM SERPL-MCNC: 8.9 MG/DL (ref 8.5–10.1)
CHLORIDE SERPL-SCNC: 99 MMOL/L (ref 94–109)
CO2 SERPL-SCNC: 32 MMOL/L (ref 20–32)
CREAT SERPL-MCNC: 1.07 MG/DL (ref 0.52–1.04)
ERYTHROCYTE [DISTWIDTH] IN BLOOD BY AUTOMATED COUNT: 18 % (ref 10–15)
GFR SERPL CREATININE-BSD FRML MDRD: 48 ML/MIN/1.7M2
GLUCOSE SERPL-MCNC: 155 MG/DL (ref 70–99)
HCT VFR BLD AUTO: 34.3 % (ref 35–47)
HGB BLD-MCNC: 10.7 G/DL (ref 11.7–15.7)
MCH RBC QN AUTO: 29.1 PG (ref 26.5–33)
MCHC RBC AUTO-ENTMCNC: 31.2 G/DL (ref 31.5–36.5)
MCV RBC AUTO: 93 FL (ref 78–100)
PLATELET # BLD AUTO: 334 10E9/L (ref 150–450)
POTASSIUM SERPL-SCNC: 4.4 MMOL/L (ref 3.4–5.3)
RBC # BLD AUTO: 3.68 10E12/L (ref 3.8–5.2)
SODIUM SERPL-SCNC: 137 MMOL/L (ref 133–144)
WBC # BLD AUTO: 8.6 10E9/L (ref 4–11)

## 2018-06-22 PROCEDURE — 85027 COMPLETE CBC AUTOMATED: CPT | Performed by: INTERNAL MEDICINE

## 2018-06-22 PROCEDURE — 80048 BASIC METABOLIC PNL TOTAL CA: CPT | Performed by: INTERNAL MEDICINE

## 2018-07-25 LAB
ANION GAP SERPL CALCULATED.3IONS-SCNC: 9 MMOL/L (ref 3–14)
BUN SERPL-MCNC: 65 MG/DL (ref 7–30)
CALCIUM SERPL-MCNC: 9.5 MG/DL (ref 8.5–10.1)
CHLORIDE SERPL-SCNC: 91 MMOL/L (ref 94–109)
CO2 SERPL-SCNC: 33 MMOL/L (ref 20–32)
CREAT SERPL-MCNC: 1.29 MG/DL (ref 0.52–1.04)
GFR SERPL CREATININE-BSD FRML MDRD: 39 ML/MIN/1.7M2
GLUCOSE SERPL-MCNC: 145 MG/DL (ref 70–99)
HGB BLD-MCNC: 11.2 G/DL (ref 11.7–15.7)
POTASSIUM SERPL-SCNC: 3.8 MMOL/L (ref 3.4–5.3)
SODIUM SERPL-SCNC: 133 MMOL/L (ref 133–144)

## 2018-07-25 PROCEDURE — 80048 BASIC METABOLIC PNL TOTAL CA: CPT | Performed by: INTERNAL MEDICINE

## 2018-07-25 PROCEDURE — 85018 HEMOGLOBIN: CPT | Performed by: INTERNAL MEDICINE

## 2018-07-31 NOTE — PROGRESS NOTES
[facility]      OUTPATIENT SPEECH LANGUAGE PATHOLOGY EVALUATION  PLAN OF TREATMENT FOR OUTPATIENT REHABILITATION  (COMPLETE FOR INITIAL CLAIMS ONLY)  Patient's Last Name, First Name, M.I.  YOB: 1930  Jacques Solis                        Provider's Name  [facility] Medical Record No.  [id number]                               Onset Date:         (Approximately 3 1/2 months ago per daughter report) Start of Care Date:       05/22/18   Type:     ___PT   ___OT   _X_SLP Medical Diagnosis:          Respiratory failure             SLP Diagnosis:         Aphonia due to cuffed trach tube Visits from SOC:  1   ________________________________________________________________  Plan of Treatment/Functional Goals    Planned Interventions:                   Assess Passy Anuel Valve (PMV) tolerance for increasing lengths of time  Provide speech strategy and voicing strategy cues to improve speech and voice production with PMV trials.                                         Goals: See Speech Language Pathology Goals on Care Plan in [facility] electronic health record.    Therapy Frequency:      5 times/wk  Predicted Duration of Therapy Intervention:        1 week; *POC Extended to 4 weeks due to medical issue barriers POC 5/22/18-6/20/18  ________________________________________________________________________________    I CERTIFY THE NEED FOR THESE SERVICES FURNISHED UNDER        THIS PLAN OF TREATMENT AND WHILE UNDER MY CARE     (Physician co-signature of this document indicates review and certification of the therapy plan).                                               Referring Physician: Dr. Perez            Initial Assessment        See Speech Language Pathology documentation in Epic electronic health record, evaluation dated        05/22/18

## 2018-08-02 ENCOUNTER — TELEPHONE (OUTPATIENT)
Dept: PULMONOLOGY | Facility: CLINIC | Age: 83
End: 2018-08-02

## 2018-08-02 NOTE — TELEPHONE ENCOUNTER
[facility] Call Center    Phone Message    May a detailed message be left on voicemail: yes    Reason for Call: Medication Refill Request    Has the patient contacted the pharmacy for the refill? Yes   Name of medication being requested: potassium chloride (KAYCIEL) 20 MEQ/15ML (10%) solution ----- PT WANTS TO SWITCH TO TABLETS   Provider who prescribed the medication: Dr. Moeller   Pharmacy: [facility] in [place]   Date medication is needed: asap         Action Taken: Message routed to:  [facility] Pulmonary

## 2018-08-02 NOTE — PROGRESS NOTES
[facility]      OUTPATIENT PHYSICAL THERAPY EVALUATION  PLAN OF TREATMENT FOR OUTPATIENT REHABILITATION  (COMPLETE FOR INITIAL CLAIMS ONLY)  Patient's Last Name, First Name, M.I.  YOB: 1930  Jacques Solis                        Provider's Name  [facility] Medical Record No.  [id number]                               Onset Date:  05/21/18   Start of Care Date:  05/23/18      Type:     _X_PT   ___OT   ___SLP Medical Diagnosis:  acute on chronic respiratory failure                        PT Diagnosis:  De-conditioning following chronic respiratory failure   Visits from SOC:  1   _________________________________________________________________________________  Plan of Treatment/Functional Goals    Planned Interventions:  ,    bed mobility training, balance training, ROM, strengthening, stretching, transfer training, progressive activity/exercise, home program guidelines,       Goals: See Physical Therapy Goals on Care Plan in [employer] electronic health record.    Therapy Frequency: 5 times/week  Predicted Duration of Therapy Intervention: 5 days  _________________________________________________________________________________    I CERTIFY THE NEED FOR THESE SERVICES FURNISHED UNDER        THIS PLAN OF TREATMENT AND WHILE UNDER MY CARE     (Physician co-signature of this document indicates review and certification of the therapy plan).                Certification date from: 05/23/18, Certification date to: 05/30/18    Referring Physician: Harvinder Nolasco MD            Initial Assessment        See Physical Therapy evaluation dated 05/23/18 in Epic electronic health record.

## 2018-08-02 NOTE — TELEPHONE ENCOUNTER
Meds ordered during hospitalization, pt should contact PCP for on-going med management.  Pharmacy notified.

## 2018-08-10 ENCOUNTER — TELEPHONE (OUTPATIENT)
Dept: PULMONOLOGY | Facility: CLINIC | Age: 83
End: 2018-08-10

## 2018-08-10 NOTE — TELEPHONE ENCOUNTER
[facility] Call Center    Phone Message    May a detailed message be left on voicemail: yes    Reason for Call: Order(s): Home Care Orders: Skilled Nursing: Tube Feeding.  Rate?    Action Taken: Message routed to:  [facility] ump pulm

## 2018-08-14 NOTE — TELEPHONE ENCOUNTER
Message left for nurse Crow informing that request needs to go through PCP not pulmonary.   Ora Stein, RN BSN